# Patient Record
Sex: MALE | Race: WHITE | Employment: UNEMPLOYED | URBAN - NONMETROPOLITAN AREA
[De-identification: names, ages, dates, MRNs, and addresses within clinical notes are randomized per-mention and may not be internally consistent; named-entity substitution may affect disease eponyms.]

---

## 2023-08-10 ENCOUNTER — HOSPITAL ENCOUNTER (INPATIENT)
Age: 58
LOS: 6 days | Discharge: HOME | DRG: 673 | End: 2023-08-16
Attending: INTERNAL MEDICINE | Admitting: STUDENT IN AN ORGANIZED HEALTH CARE EDUCATION/TRAINING PROGRAM
Payer: MEDICARE

## 2023-08-10 PROBLEM — R06.09 DYSPNEA ON EXERTION: Status: ACTIVE | Noted: 2023-08-10

## 2023-08-10 PROBLEM — N17.9 ACUTE KIDNEY INJURY SUPERIMPOSED ON CHRONIC KIDNEY DISEASE (HCC): Status: ACTIVE | Noted: 2023-08-10

## 2023-08-10 PROBLEM — E11.9 TYPE II DIABETES MELLITUS (HCC): Status: ACTIVE | Noted: 2023-08-10

## 2023-08-10 PROBLEM — N18.9 ACUTE KIDNEY INJURY SUPERIMPOSED ON CHRONIC KIDNEY DISEASE (HCC): Status: ACTIVE | Noted: 2023-08-10

## 2023-08-10 PROBLEM — R11.2 ACUTE NAUSEA WITH NONBILIOUS VOMITING: Status: ACTIVE | Noted: 2023-08-10

## 2023-08-10 PROBLEM — Z72.0 TOBACCO USE: Status: ACTIVE | Noted: 2023-08-10

## 2023-08-10 PROBLEM — D64.9 ANEMIA, UNSPECIFIED: Status: ACTIVE | Noted: 2023-08-10

## 2023-08-10 LAB
ALBUMIN SERPL-MCNC: 3.4 G/DL (ref 3.5–5.2)
ALP SERPL-CCNC: 90 U/L (ref 40–130)
ALT SERPL-CCNC: 14 U/L (ref 5–41)
ANION GAP SERPL CALCULATED.3IONS-SCNC: 15 MMOL/L (ref 7–19)
AST SERPL-CCNC: 24 U/L (ref 5–40)
BACTERIA URNS QL MICRO: NEGATIVE /HPF
BASOPHILS # BLD: 0.1 K/UL (ref 0–0.2)
BASOPHILS NFR BLD: 0.5 % (ref 0–1)
BILIRUB SERPL-MCNC: 0.6 MG/DL (ref 0.2–1.2)
BILIRUB UR QL STRIP: NEGATIVE
BUN SERPL-MCNC: 44 MG/DL (ref 6–20)
CALCIUM SERPL-MCNC: 8.7 MG/DL (ref 8.6–10)
CHLORIDE SERPL-SCNC: 101 MMOL/L (ref 98–111)
CLARITY UR: CLEAR
CO2 SERPL-SCNC: 18 MMOL/L (ref 22–29)
COLOR UR: YELLOW
CREAT SERPL-MCNC: 5.9 MG/DL (ref 0.5–1.2)
CREAT UR-MCNC: 102.9 MG/DL (ref 39–259)
CRYSTALS URNS MICRO: ABNORMAL /HPF
EOSINOPHIL # BLD: 0.2 K/UL (ref 0–0.6)
EOSINOPHIL NFR BLD: 1.6 % (ref 0–5)
EPI CELLS #/AREA URNS AUTO: 1 /HPF (ref 0–5)
ERYTHROCYTE [DISTWIDTH] IN BLOOD BY AUTOMATED COUNT: 12.4 % (ref 11.5–14.5)
GLUCOSE BLD-MCNC: 217 MG/DL (ref 70–99)
GLUCOSE SERPL-MCNC: 198 MG/DL (ref 74–109)
GLUCOSE UR STRIP.AUTO-MCNC: 250 MG/DL
HCT VFR BLD AUTO: 28.4 % (ref 42–52)
HGB BLD-MCNC: 9.2 G/DL (ref 14–18)
HGB UR STRIP.AUTO-MCNC: ABNORMAL MG/L
HYALINE CASTS #/AREA URNS AUTO: 0 /HPF (ref 0–8)
IMM GRANULOCYTES # BLD: 0 K/UL
KETONES UR STRIP.AUTO-MCNC: NEGATIVE MG/DL
LEUKOCYTE ESTERASE UR QL STRIP.AUTO: NEGATIVE
LYMPHOCYTES # BLD: 1.5 K/UL (ref 1.1–4.5)
LYMPHOCYTES NFR BLD: 13.7 % (ref 20–40)
MAGNESIUM SERPL-MCNC: 2.1 MG/DL (ref 1.6–2.6)
MCH RBC QN AUTO: 30.1 PG (ref 27–31)
MCHC RBC AUTO-ENTMCNC: 32.4 G/DL (ref 33–37)
MCV RBC AUTO: 92.8 FL (ref 80–94)
MONOCYTES # BLD: 1.1 K/UL (ref 0–0.9)
MONOCYTES NFR BLD: 10.1 % (ref 0–10)
NEUTROPHILS # BLD: 8.1 K/UL (ref 1.5–7.5)
NEUTS SEG NFR BLD: 73.7 % (ref 50–65)
NITRITE UR QL STRIP.AUTO: NEGATIVE
OSMOLALITY URINE: 390 MOSM/KG (ref 250–1200)
PERFORMED ON: ABNORMAL
PH UR STRIP.AUTO: 5.5 [PH] (ref 5–8)
PHOSPHATE SERPL-MCNC: 3.3 MG/DL (ref 2.5–4.5)
PLATELET # BLD AUTO: 202 K/UL (ref 130–400)
PMV BLD AUTO: 10.9 FL (ref 9.4–12.4)
POTASSIUM SERPL-SCNC: 4.2 MMOL/L (ref 3.5–5)
PROT SERPL-MCNC: 7 G/DL (ref 6.6–8.7)
PROT UR STRIP.AUTO-MCNC: 300 MG/DL
RBC # BLD AUTO: 3.06 M/UL (ref 4.7–6.1)
RBC #/AREA URNS AUTO: 8 /HPF (ref 0–4)
SODIUM SERPL-SCNC: 134 MMOL/L (ref 136–145)
SODIUM UR-SCNC: 99 MMOL/L
SP GR UR STRIP.AUTO: 1.02 (ref 1–1.03)
UROBILINOGEN UR STRIP.AUTO-MCNC: 1 E.U./DL
WBC # BLD AUTO: 11 K/UL (ref 4.8–10.8)
WBC #/AREA URNS AUTO: 1 /HPF (ref 0–5)

## 2023-08-10 PROCEDURE — 6360000002 HC RX W HCPCS: Performed by: HOSPITALIST

## 2023-08-10 PROCEDURE — 85025 COMPLETE CBC W/AUTO DIFF WBC: CPT

## 2023-08-10 PROCEDURE — 84100 ASSAY OF PHOSPHORUS: CPT

## 2023-08-10 PROCEDURE — 2500000003 HC RX 250 WO HCPCS: Performed by: HOSPITALIST

## 2023-08-10 PROCEDURE — 1210000000 HC MED SURG R&B

## 2023-08-10 PROCEDURE — 83735 ASSAY OF MAGNESIUM: CPT

## 2023-08-10 PROCEDURE — 83935 ASSAY OF URINE OSMOLALITY: CPT

## 2023-08-10 PROCEDURE — 2580000003 HC RX 258: Performed by: HOSPITALIST

## 2023-08-10 PROCEDURE — 82962 GLUCOSE BLOOD TEST: CPT

## 2023-08-10 PROCEDURE — 94760 N-INVAS EAR/PLS OXIMETRY 1: CPT

## 2023-08-10 PROCEDURE — 82570 ASSAY OF URINE CREATININE: CPT

## 2023-08-10 PROCEDURE — 80053 COMPREHEN METABOLIC PANEL: CPT

## 2023-08-10 PROCEDURE — 81001 URINALYSIS AUTO W/SCOPE: CPT

## 2023-08-10 PROCEDURE — 6370000000 HC RX 637 (ALT 250 FOR IP): Performed by: HOSPITALIST

## 2023-08-10 PROCEDURE — 82043 UR ALBUMIN QUANTITATIVE: CPT

## 2023-08-10 PROCEDURE — 87205 SMEAR GRAM STAIN: CPT

## 2023-08-10 PROCEDURE — 6370000000 HC RX 637 (ALT 250 FOR IP)

## 2023-08-10 PROCEDURE — 36415 COLL VENOUS BLD VENIPUNCTURE: CPT

## 2023-08-10 PROCEDURE — 84300 ASSAY OF URINE SODIUM: CPT

## 2023-08-10 RX ORDER — HEPARIN SODIUM 5000 [USP'U]/ML
5000 INJECTION, SOLUTION INTRAVENOUS; SUBCUTANEOUS EVERY 8 HOURS SCHEDULED
Status: DISCONTINUED | OUTPATIENT
Start: 2023-08-10 | End: 2023-08-11

## 2023-08-10 RX ORDER — ACETAMINOPHEN 650 MG/1
650 SUPPOSITORY RECTAL EVERY 4 HOURS PRN
Status: DISCONTINUED | OUTPATIENT
Start: 2023-08-10 | End: 2023-08-16 | Stop reason: HOSPADM

## 2023-08-10 RX ORDER — METOPROLOL TARTRATE 50 MG/1
50 TABLET, FILM COATED ORAL 2 TIMES DAILY
Status: DISCONTINUED | OUTPATIENT
Start: 2023-08-10 | End: 2023-08-14

## 2023-08-10 RX ORDER — INSULIN LISPRO 100 [IU]/ML
0-4 INJECTION, SOLUTION INTRAVENOUS; SUBCUTANEOUS NIGHTLY
Status: DISCONTINUED | OUTPATIENT
Start: 2023-08-10 | End: 2023-08-16 | Stop reason: HOSPADM

## 2023-08-10 RX ORDER — SODIUM CHLORIDE 0.9 % (FLUSH) 0.9 %
5-40 SYRINGE (ML) INJECTION EVERY 12 HOURS SCHEDULED
Status: DISCONTINUED | OUTPATIENT
Start: 2023-08-10 | End: 2023-08-11 | Stop reason: SDUPTHER

## 2023-08-10 RX ORDER — MONTELUKAST SODIUM 10 MG/1
10 TABLET ORAL DAILY
COMMUNITY

## 2023-08-10 RX ORDER — MONTELUKAST SODIUM 10 MG/1
10 TABLET ORAL DAILY
Status: DISCONTINUED | OUTPATIENT
Start: 2023-08-11 | End: 2023-08-16 | Stop reason: HOSPADM

## 2023-08-10 RX ORDER — DEXTROSE MONOHYDRATE 100 MG/ML
INJECTION, SOLUTION INTRAVENOUS CONTINUOUS PRN
Status: DISCONTINUED | OUTPATIENT
Start: 2023-08-10 | End: 2023-08-16 | Stop reason: HOSPADM

## 2023-08-10 RX ORDER — AMLODIPINE BESYLATE 10 MG/1
10 TABLET ORAL DAILY
Status: DISCONTINUED | OUTPATIENT
Start: 2023-08-11 | End: 2023-08-16 | Stop reason: HOSPADM

## 2023-08-10 RX ORDER — SODIUM CHLORIDE 0.9 % (FLUSH) 0.9 %
5-40 SYRINGE (ML) INJECTION PRN
Status: DISCONTINUED | OUTPATIENT
Start: 2023-08-10 | End: 2023-08-11 | Stop reason: SDUPTHER

## 2023-08-10 RX ORDER — LEVOCETIRIZINE DIHYDROCHLORIDE 5 MG/1
5 TABLET, FILM COATED ORAL DAILY
COMMUNITY

## 2023-08-10 RX ORDER — INSULIN LISPRO 100 [IU]/ML
0-4 INJECTION, SOLUTION INTRAVENOUS; SUBCUTANEOUS
Status: DISCONTINUED | OUTPATIENT
Start: 2023-08-11 | End: 2023-08-16 | Stop reason: HOSPADM

## 2023-08-10 RX ORDER — FERROUS SULFATE 325(65) MG
650 TABLET ORAL
Status: DISCONTINUED | OUTPATIENT
Start: 2023-08-11 | End: 2023-08-12

## 2023-08-10 RX ORDER — CALCIUM CARBONATE 500 MG/1
500 TABLET, CHEWABLE ORAL 3 TIMES DAILY PRN
Status: DISCONTINUED | OUTPATIENT
Start: 2023-08-10 | End: 2023-08-16 | Stop reason: HOSPADM

## 2023-08-10 RX ORDER — FERROUS SULFATE 325(65) MG
650 TABLET ORAL
COMMUNITY

## 2023-08-10 RX ORDER — CHOLECALCIFEROL (VITAMIN D3) 1250 MCG
CAPSULE ORAL
Status: ON HOLD | COMMUNITY
End: 2023-08-16 | Stop reason: HOSPADM

## 2023-08-10 RX ORDER — METOPROLOL TARTRATE 50 MG/1
50 TABLET, FILM COATED ORAL 2 TIMES DAILY
Status: ON HOLD | COMMUNITY
End: 2023-08-16 | Stop reason: HOSPADM

## 2023-08-10 RX ORDER — INSULIN LISPRO 100 [IU]/ML
INJECTION, SOLUTION INTRAVENOUS; SUBCUTANEOUS
COMMUNITY

## 2023-08-10 RX ORDER — SUCRALFATE 1 G/1
1 TABLET ORAL
COMMUNITY

## 2023-08-10 RX ORDER — AMLODIPINE BESYLATE 10 MG/1
10 TABLET ORAL DAILY
COMMUNITY

## 2023-08-10 RX ORDER — SUCRALFATE 1 G/1
1 TABLET ORAL
Status: DISCONTINUED | OUTPATIENT
Start: 2023-08-11 | End: 2023-08-16 | Stop reason: HOSPADM

## 2023-08-10 RX ORDER — CETIRIZINE HYDROCHLORIDE 5 MG/1
5 TABLET ORAL DAILY
Status: DISCONTINUED | OUTPATIENT
Start: 2023-08-11 | End: 2023-08-16 | Stop reason: HOSPADM

## 2023-08-10 RX ORDER — TRIAMTERENE AND HYDROCHLOROTHIAZIDE 37.5; 25 MG/1; MG/1
1 TABLET ORAL DAILY
Status: ON HOLD | COMMUNITY
End: 2023-08-16 | Stop reason: HOSPADM

## 2023-08-10 RX ORDER — POLYETHYLENE GLYCOL 3350 17 G/17G
17 POWDER, FOR SOLUTION ORAL DAILY PRN
Status: DISCONTINUED | OUTPATIENT
Start: 2023-08-10 | End: 2023-08-16 | Stop reason: HOSPADM

## 2023-08-10 RX ORDER — SODIUM CHLORIDE 9 MG/ML
INJECTION, SOLUTION INTRAVENOUS PRN
Status: DISCONTINUED | OUTPATIENT
Start: 2023-08-10 | End: 2023-08-16 | Stop reason: HOSPADM

## 2023-08-10 RX ORDER — ACETAMINOPHEN 325 MG/1
650 TABLET ORAL EVERY 4 HOURS PRN
Status: DISCONTINUED | OUTPATIENT
Start: 2023-08-10 | End: 2023-08-16 | Stop reason: HOSPADM

## 2023-08-10 RX ORDER — ONDANSETRON 4 MG/1
4 TABLET, ORALLY DISINTEGRATING ORAL EVERY 8 HOURS PRN
Status: DISCONTINUED | OUTPATIENT
Start: 2023-08-10 | End: 2023-08-16 | Stop reason: HOSPADM

## 2023-08-10 RX ORDER — MECOBALAMIN 5000 MCG
5 TABLET,DISINTEGRATING ORAL NIGHTLY PRN
Status: DISCONTINUED | OUTPATIENT
Start: 2023-08-10 | End: 2023-08-16 | Stop reason: HOSPADM

## 2023-08-10 RX ORDER — ONDANSETRON 2 MG/ML
4 INJECTION INTRAMUSCULAR; INTRAVENOUS EVERY 6 HOURS PRN
Status: DISCONTINUED | OUTPATIENT
Start: 2023-08-10 | End: 2023-08-16 | Stop reason: HOSPADM

## 2023-08-10 RX ORDER — FAMOTIDINE 20 MG/1
20 TABLET, FILM COATED ORAL DAILY
Status: DISCONTINUED | OUTPATIENT
Start: 2023-08-10 | End: 2023-08-16 | Stop reason: HOSPADM

## 2023-08-10 RX ORDER — FAMOTIDINE 40 MG/1
40 TABLET, FILM COATED ORAL 2 TIMES DAILY
Status: ON HOLD | COMMUNITY
End: 2023-08-16 | Stop reason: SDUPTHER

## 2023-08-10 RX ORDER — FAMOTIDINE 20 MG/1
20 TABLET, FILM COATED ORAL 2 TIMES DAILY
Status: DISCONTINUED | OUTPATIENT
Start: 2023-08-10 | End: 2023-08-10 | Stop reason: DRUGHIGH

## 2023-08-10 RX ORDER — FUROSEMIDE 40 MG/1
40 TABLET ORAL DAILY
COMMUNITY

## 2023-08-10 RX ADMIN — Medication 5 MG: at 22:33

## 2023-08-10 RX ADMIN — SODIUM CHLORIDE, PRESERVATIVE FREE 10 ML: 5 INJECTION INTRAVENOUS at 22:35

## 2023-08-10 RX ADMIN — SODIUM BICARBONATE: 84 INJECTION, SOLUTION INTRAVENOUS at 22:32

## 2023-08-10 RX ADMIN — METOPROLOL TARTRATE 50 MG: 50 TABLET, FILM COATED ORAL at 22:33

## 2023-08-10 RX ADMIN — FAMOTIDINE 20 MG: 20 TABLET ORAL at 22:33

## 2023-08-10 RX ADMIN — HEPARIN SODIUM 5000 UNITS: 5000 INJECTION INTRAVENOUS; SUBCUTANEOUS at 22:35

## 2023-08-10 ASSESSMENT — ENCOUNTER SYMPTOMS
NAUSEA: 1
WHEEZING: 0
ABDOMINAL PAIN: 0
SHORTNESS OF BREATH: 1
COLOR CHANGE: 0
VOMITING: 1
CHEST TIGHTNESS: 0
CONSTIPATION: 0
COUGH: 1
ABDOMINAL DISTENTION: 0

## 2023-08-11 ENCOUNTER — APPOINTMENT (OUTPATIENT)
Dept: INTERVENTIONAL RADIOLOGY/VASCULAR | Age: 58
DRG: 673 | End: 2023-08-11
Attending: SURGERY
Payer: MEDICARE

## 2023-08-11 ENCOUNTER — APPOINTMENT (OUTPATIENT)
Dept: ULTRASOUND IMAGING | Age: 58
DRG: 673 | End: 2023-08-11
Attending: INTERNAL MEDICINE
Payer: MEDICARE

## 2023-08-11 ENCOUNTER — APPOINTMENT (OUTPATIENT)
Dept: CT IMAGING | Age: 58
DRG: 673 | End: 2023-08-11
Attending: INTERNAL MEDICINE
Payer: MEDICARE

## 2023-08-11 LAB
25(OH)D3 SERPL-MCNC: 23.6 NG/ML
25(OH)D3 SERPL-MCNC: 28.4 NG/ML
ANION GAP SERPL CALCULATED.3IONS-SCNC: 14 MMOL/L (ref 7–19)
APTT PPP: 32.9 SEC (ref 26–36.2)
BACTERIA URNS QL MICRO: NEGATIVE /HPF
BASOPHILS # BLD: 0.1 K/UL (ref 0–0.2)
BASOPHILS NFR BLD: 0.5 % (ref 0–1)
BILIRUB UR QL STRIP: NEGATIVE
BNP BLD-MCNC: ABNORMAL PG/ML (ref 0–124)
BUN SERPL-MCNC: 46 MG/DL (ref 6–20)
CALCIUM SERPL-MCNC: 8.3 MG/DL (ref 8.6–10)
CHLORIDE SERPL-SCNC: 102 MMOL/L (ref 98–111)
CLARITY UR: CLEAR
CO2 SERPL-SCNC: 19 MMOL/L (ref 22–29)
COLOR UR: YELLOW
CREAT SERPL-MCNC: 6.2 MG/DL (ref 0.5–1.2)
CREAT UR-MCNC: 109.3 MG/DL (ref 39–259)
CREAT UR-MCNC: 111.7 MG/DL (ref 39–259)
CRYSTALS URNS MICRO: ABNORMAL /HPF
D DIMER PPP FEU-MCNC: 2.49 UG/ML FEU (ref 0–0.48)
EOSINOPHIL # BLD: 0.2 K/UL (ref 0–0.6)
EOSINOPHIL NFR BLD: 1.9 % (ref 0–5)
EOSINOPHIL URNS QL MICRO: NORMAL
EPI CELLS #/AREA URNS AUTO: 0 /HPF (ref 0–5)
ERYTHROCYTE [DISTWIDTH] IN BLOOD BY AUTOMATED COUNT: 12.6 % (ref 11.5–14.5)
FERRITIN SERPL-MCNC: 771.3 NG/ML (ref 30–400)
FOLATE SERPL-MCNC: 9.3 NG/ML (ref 4.5–32.2)
GLUCOSE BLD-MCNC: 161 MG/DL (ref 70–99)
GLUCOSE BLD-MCNC: 171 MG/DL (ref 70–99)
GLUCOSE BLD-MCNC: 181 MG/DL (ref 70–99)
GLUCOSE BLD-MCNC: 188 MG/DL (ref 70–99)
GLUCOSE BLD-MCNC: 192 MG/DL (ref 70–99)
GLUCOSE SERPL-MCNC: 170 MG/DL (ref 74–109)
GLUCOSE UR STRIP.AUTO-MCNC: 100 MG/DL
HAPTOGLOB SERPL-MCNC: 358 MG/DL (ref 30–200)
HAV IGM SERPL QL IA: NORMAL
HBA1C MFR BLD: 6.3 % (ref 4–6)
HBV CORE IGM SERPL QL IA: NORMAL
HBV SURFACE AB SERPL IA-ACNC: NORMAL M[IU]/ML
HBV SURFACE AG SERPL QL IA: NORMAL
HCT VFR BLD AUTO: 28.6 % (ref 42–52)
HCT VFR BLD AUTO: 29.2 % (ref 42–52)
HCV AB SERPL QL IA: NORMAL
HGB BLD-MCNC: 9 G/DL (ref 14–18)
HGB UR STRIP.AUTO-MCNC: ABNORMAL MG/L
HYALINE CASTS #/AREA URNS AUTO: 0 /HPF (ref 0–8)
IMM GRANULOCYTES # BLD: 0 K/UL
IRON SATN MFR SERPL: 12 % (ref 14–50)
IRON SERPL-MCNC: 16 UG/DL (ref 59–158)
KETONES UR STRIP.AUTO-MCNC: NEGATIVE MG/DL
LDH SERPL-CCNC: 479 U/L (ref 91–215)
LEUKOCYTE ESTERASE UR QL STRIP.AUTO: NEGATIVE
LV EF: 35 %
LVEF MODALITY: NORMAL
LYMPHOCYTES # BLD: 1.8 K/UL (ref 1.1–4.5)
LYMPHOCYTES NFR BLD: 13.8 % (ref 20–40)
MCH RBC QN AUTO: 29.8 PG (ref 27–31)
MCHC RBC AUTO-ENTMCNC: 30.8 G/DL (ref 33–37)
MCV RBC AUTO: 96.7 FL (ref 80–94)
MICROALBUMIN UR-MCNC: 201.1 MG/DL (ref 0–19)
MICROALBUMIN UR-MCNC: 328.8 MG/DL (ref 0–19)
MICROALBUMIN/CREAT UR-RTO: 1839.9 MG/G
MICROALBUMIN/CREAT UR-RTO: 2943.6 MG/G
MONOCYTES # BLD: 1.1 K/UL (ref 0–0.9)
MONOCYTES NFR BLD: 8.5 % (ref 0–10)
NEUTROPHILS # BLD: 9.6 K/UL (ref 1.5–7.5)
NEUTS SEG NFR BLD: 75 % (ref 50–65)
NITRITE UR QL STRIP.AUTO: NEGATIVE
PERFORMED ON: ABNORMAL
PH UR STRIP.AUTO: 6 [PH] (ref 5–8)
PLATELET # BLD AUTO: 226 K/UL (ref 130–400)
PMV BLD AUTO: 11.8 FL (ref 9.4–12.4)
POTASSIUM SERPL-SCNC: 4.8 MMOL/L (ref 3.5–5)
PROT UR STRIP.AUTO-MCNC: 300 MG/DL
PROT UR-MCNC: 349 MG/DL (ref 15–45)
PTH-INTACT SERPL-MCNC: 341.5 PG/ML (ref 15–65)
RBC # BLD AUTO: 3.02 M/UL (ref 4.7–6.1)
RBC #/AREA URNS AUTO: 5 /HPF (ref 0–4)
RETICS # AUTO: 0.04 M/UL (ref 0.03–0.12)
RETICS/RBC NFR: 1.2 % (ref 0.5–1.5)
SODIUM SERPL-SCNC: 135 MMOL/L (ref 136–145)
SODIUM UR-SCNC: 62 MMOL/L
SP GR UR STRIP.AUTO: 1.02 (ref 1–1.03)
TIBC SERPL-MCNC: 129 UG/DL (ref 250–400)
TROPONIN T SERPL-MCNC: 6.21 NG/ML (ref 0–0.03)
TROPONIN T SERPL-MCNC: 7.06 NG/ML (ref 0–0.03)
UROBILINOGEN UR STRIP.AUTO-MCNC: 1 E.U./DL
VIT B12 SERPL-MCNC: 542 PG/ML (ref 211–946)
WBC # BLD AUTO: 12.8 K/UL (ref 4.8–10.8)
WBC #/AREA URNS AUTO: 1 /HPF (ref 0–5)

## 2023-08-11 PROCEDURE — 85379 FIBRIN DEGRADATION QUANT: CPT

## 2023-08-11 PROCEDURE — 86706 HEP B SURFACE ANTIBODY: CPT

## 2023-08-11 PROCEDURE — B5181ZA FLUOROSCOPY OF SUPERIOR VENA CAVA USING LOW OSMOLAR CONTRAST, GUIDANCE: ICD-10-PCS | Performed by: SURGERY

## 2023-08-11 PROCEDURE — 85045 AUTOMATED RETICULOCYTE COUNT: CPT

## 2023-08-11 PROCEDURE — 83550 IRON BINDING TEST: CPT

## 2023-08-11 PROCEDURE — 84156 ASSAY OF PROTEIN URINE: CPT

## 2023-08-11 PROCEDURE — 83036 HEMOGLOBIN GLYCOSYLATED A1C: CPT

## 2023-08-11 PROCEDURE — 2580000003 HC RX 258: Performed by: SURGERY

## 2023-08-11 PROCEDURE — 6370000000 HC RX 637 (ALT 250 FOR IP)

## 2023-08-11 PROCEDURE — 1210000000 HC MED SURG R&B

## 2023-08-11 PROCEDURE — 82043 UR ALBUMIN QUANTITATIVE: CPT

## 2023-08-11 PROCEDURE — 80074 ACUTE HEPATITIS PANEL: CPT

## 2023-08-11 PROCEDURE — 83010 ASSAY OF HAPTOGLOBIN QUANT: CPT

## 2023-08-11 PROCEDURE — 99153 MOD SED SAME PHYS/QHP EA: CPT

## 2023-08-11 PROCEDURE — 82306 VITAMIN D 25 HYDROXY: CPT

## 2023-08-11 PROCEDURE — 02HV33Z INSERTION OF INFUSION DEVICE INTO SUPERIOR VENA CAVA, PERCUTANEOUS APPROACH: ICD-10-PCS | Performed by: SURGERY

## 2023-08-11 PROCEDURE — 36415 COLL VENOUS BLD VENIPUNCTURE: CPT

## 2023-08-11 PROCEDURE — C8929 TTE W OR WO FOL WCON,DOPPLER: HCPCS

## 2023-08-11 PROCEDURE — 99152 MOD SED SAME PHYS/QHP 5/>YRS: CPT

## 2023-08-11 PROCEDURE — 82962 GLUCOSE BLOOD TEST: CPT

## 2023-08-11 PROCEDURE — 82607 VITAMIN B-12: CPT

## 2023-08-11 PROCEDURE — 36558 INSERT TUNNELED CV CATH: CPT | Performed by: SURGERY

## 2023-08-11 PROCEDURE — 83540 ASSAY OF IRON: CPT

## 2023-08-11 PROCEDURE — 6360000002 HC RX W HCPCS: Performed by: SURGERY

## 2023-08-11 PROCEDURE — 6360000002 HC RX W HCPCS: Performed by: STUDENT IN AN ORGANIZED HEALTH CARE EDUCATION/TRAINING PROGRAM

## 2023-08-11 PROCEDURE — 74176 CT ABD & PELVIS W/O CONTRAST: CPT

## 2023-08-11 PROCEDURE — 6370000000 HC RX 637 (ALT 250 FOR IP): Performed by: SURGERY

## 2023-08-11 PROCEDURE — 84484 ASSAY OF TROPONIN QUANT: CPT

## 2023-08-11 PROCEDURE — 83880 ASSAY OF NATRIURETIC PEPTIDE: CPT

## 2023-08-11 PROCEDURE — 6370000000 HC RX 637 (ALT 250 FOR IP): Performed by: HOSPITALIST

## 2023-08-11 PROCEDURE — 94760 N-INVAS EAR/PLS OXIMETRY 1: CPT

## 2023-08-11 PROCEDURE — 6360000002 HC RX W HCPCS: Performed by: HOSPITALIST

## 2023-08-11 PROCEDURE — 76937 US GUIDE VASCULAR ACCESS: CPT

## 2023-08-11 PROCEDURE — C1769 GUIDE WIRE: HCPCS

## 2023-08-11 PROCEDURE — 85730 THROMBOPLASTIN TIME PARTIAL: CPT

## 2023-08-11 PROCEDURE — 0JH63XZ INSERTION OF TUNNELED VASCULAR ACCESS DEVICE INTO CHEST SUBCUTANEOUS TISSUE AND FASCIA, PERCUTANEOUS APPROACH: ICD-10-PCS | Performed by: SURGERY

## 2023-08-11 PROCEDURE — 77001 FLUOROGUIDE FOR VEIN DEVICE: CPT

## 2023-08-11 PROCEDURE — 82570 ASSAY OF URINE CREATININE: CPT

## 2023-08-11 PROCEDURE — 81001 URINALYSIS AUTO W/SCOPE: CPT

## 2023-08-11 PROCEDURE — 36558 INSERT TUNNELED CV CATH: CPT

## 2023-08-11 PROCEDURE — 83970 ASSAY OF PARATHORMONE: CPT

## 2023-08-11 PROCEDURE — 2500000003 HC RX 250 WO HCPCS: Performed by: SURGERY

## 2023-08-11 PROCEDURE — 6370000000 HC RX 637 (ALT 250 FOR IP): Performed by: INTERNAL MEDICINE

## 2023-08-11 PROCEDURE — 82746 ASSAY OF FOLIC ACID SERUM: CPT

## 2023-08-11 PROCEDURE — 80048 BASIC METABOLIC PNL TOTAL CA: CPT

## 2023-08-11 PROCEDURE — 85025 COMPLETE CBC W/AUTO DIFF WBC: CPT

## 2023-08-11 PROCEDURE — 77001 FLUOROGUIDE FOR VEIN DEVICE: CPT | Performed by: SURGERY

## 2023-08-11 PROCEDURE — 84300 ASSAY OF URINE SODIUM: CPT

## 2023-08-11 PROCEDURE — 76770 US EXAM ABDO BACK WALL COMP: CPT

## 2023-08-11 PROCEDURE — 82728 ASSAY OF FERRITIN: CPT

## 2023-08-11 PROCEDURE — 83615 LACTATE (LD) (LDH) ENZYME: CPT

## 2023-08-11 RX ORDER — SODIUM CHLORIDE 0.9 % (FLUSH) 0.9 %
5-40 SYRINGE (ML) INJECTION EVERY 12 HOURS SCHEDULED
Status: DISCONTINUED | OUTPATIENT
Start: 2023-08-11 | End: 2023-08-16 | Stop reason: HOSPADM

## 2023-08-11 RX ORDER — ENOXAPARIN SODIUM 100 MG/ML
30 INJECTION SUBCUTANEOUS DAILY
Status: DISCONTINUED | OUTPATIENT
Start: 2023-08-12 | End: 2023-08-11 | Stop reason: SDUPTHER

## 2023-08-11 RX ORDER — ASPIRIN 81 MG/1
81 TABLET ORAL DAILY
Status: DISCONTINUED | OUTPATIENT
Start: 2023-08-12 | End: 2023-08-11

## 2023-08-11 RX ORDER — INSULIN LISPRO 100 [IU]/ML
2 INJECTION, SOLUTION INTRAVENOUS; SUBCUTANEOUS
Status: DISCONTINUED | OUTPATIENT
Start: 2023-08-11 | End: 2023-08-16 | Stop reason: HOSPADM

## 2023-08-11 RX ORDER — HEPARIN SODIUM 1000 [USP'U]/ML
4000 INJECTION, SOLUTION INTRAVENOUS; SUBCUTANEOUS ONCE
Status: COMPLETED | OUTPATIENT
Start: 2023-08-11 | End: 2023-08-11

## 2023-08-11 RX ORDER — INSULIN GLARGINE 100 [IU]/ML
9 INJECTION, SOLUTION SUBCUTANEOUS DAILY
Status: DISCONTINUED | OUTPATIENT
Start: 2023-08-11 | End: 2023-08-16 | Stop reason: HOSPADM

## 2023-08-11 RX ORDER — FENTANYL CITRATE 50 UG/ML
INJECTION, SOLUTION INTRAMUSCULAR; INTRAVENOUS PRN
Status: COMPLETED | OUTPATIENT
Start: 2023-08-11 | End: 2023-08-11

## 2023-08-11 RX ORDER — HEPARIN SODIUM 5000 [USP'U]/ML
INJECTION, SOLUTION INTRAVENOUS; SUBCUTANEOUS PRN
Status: COMPLETED | OUTPATIENT
Start: 2023-08-11 | End: 2023-08-11

## 2023-08-11 RX ORDER — ASPIRIN 81 MG/1
162 TABLET, CHEWABLE ORAL ONCE
Status: COMPLETED | OUTPATIENT
Start: 2023-08-11 | End: 2023-08-11

## 2023-08-11 RX ORDER — HEPARIN SODIUM 10000 [USP'U]/100ML
5-30 INJECTION, SOLUTION INTRAVENOUS CONTINUOUS
Status: DISCONTINUED | OUTPATIENT
Start: 2023-08-11 | End: 2023-08-16

## 2023-08-11 RX ORDER — ASPIRIN 81 MG/1
324 TABLET, CHEWABLE ORAL ONCE
Status: DISCONTINUED | OUTPATIENT
Start: 2023-08-11 | End: 2023-08-11

## 2023-08-11 RX ORDER — SODIUM CHLORIDE 0.9 % (FLUSH) 0.9 %
5-40 SYRINGE (ML) INJECTION PRN
Status: DISCONTINUED | OUTPATIENT
Start: 2023-08-11 | End: 2023-08-16 | Stop reason: HOSPADM

## 2023-08-11 RX ORDER — HEPARIN SODIUM 1000 [USP'U]/ML
4000 INJECTION, SOLUTION INTRAVENOUS; SUBCUTANEOUS PRN
Status: DISCONTINUED | OUTPATIENT
Start: 2023-08-11 | End: 2023-08-16 | Stop reason: SDUPTHER

## 2023-08-11 RX ORDER — ATORVASTATIN CALCIUM 40 MG/1
40 TABLET, FILM COATED ORAL NIGHTLY
Status: DISCONTINUED | OUTPATIENT
Start: 2023-08-11 | End: 2023-08-16 | Stop reason: HOSPADM

## 2023-08-11 RX ORDER — MIDAZOLAM HYDROCHLORIDE 2 MG/2ML
INJECTION, SOLUTION INTRAMUSCULAR; INTRAVENOUS PRN
Status: COMPLETED | OUTPATIENT
Start: 2023-08-11 | End: 2023-08-11

## 2023-08-11 RX ORDER — ASPIRIN 81 MG/1
81 TABLET, CHEWABLE ORAL DAILY
Status: DISCONTINUED | OUTPATIENT
Start: 2023-08-11 | End: 2023-08-16 | Stop reason: HOSPADM

## 2023-08-11 RX ORDER — SODIUM CHLORIDE 9 MG/ML
INJECTION, SOLUTION INTRAVENOUS PRN
Status: DISCONTINUED | OUTPATIENT
Start: 2023-08-11 | End: 2023-08-16 | Stop reason: HOSPADM

## 2023-08-11 RX ORDER — LIDOCAINE HYDROCHLORIDE AND EPINEPHRINE BITARTRATE 20; .01 MG/ML; MG/ML
INJECTION, SOLUTION SUBCUTANEOUS PRN
Status: COMPLETED | OUTPATIENT
Start: 2023-08-11 | End: 2023-08-11

## 2023-08-11 RX ORDER — HEPARIN SODIUM 1000 [USP'U]/ML
2000 INJECTION, SOLUTION INTRAVENOUS; SUBCUTANEOUS PRN
Status: DISCONTINUED | OUTPATIENT
Start: 2023-08-11 | End: 2023-08-16 | Stop reason: SDUPTHER

## 2023-08-11 RX ADMIN — METOPROLOL TARTRATE 50 MG: 50 TABLET, FILM COATED ORAL at 19:58

## 2023-08-11 RX ADMIN — FENTANYL CITRATE 25 MCG: 50 INJECTION, SOLUTION INTRAMUSCULAR; INTRAVENOUS at 15:54

## 2023-08-11 RX ADMIN — ASPIRIN 81 MG: 81 TABLET, CHEWABLE ORAL at 19:58

## 2023-08-11 RX ADMIN — FAMOTIDINE 20 MG: 20 TABLET ORAL at 19:58

## 2023-08-11 RX ADMIN — INSULIN GLARGINE 9 UNITS: 100 INJECTION, SOLUTION SUBCUTANEOUS at 08:47

## 2023-08-11 RX ADMIN — MONTELUKAST SODIUM 10 MG: 10 TABLET, FILM COATED ORAL at 08:47

## 2023-08-11 RX ADMIN — HEPARIN SODIUM 4000 UNITS: 1000 INJECTION INTRAVENOUS; SUBCUTANEOUS at 20:49

## 2023-08-11 RX ADMIN — MIDAZOLAM HYDROCHLORIDE 1 MG: 1 INJECTION, SOLUTION INTRAMUSCULAR; INTRAVENOUS at 15:51

## 2023-08-11 RX ADMIN — INSULIN LISPRO 2 UNITS: 100 INJECTION, SOLUTION INTRAVENOUS; SUBCUTANEOUS at 19:03

## 2023-08-11 RX ADMIN — HEPARIN SODIUM 5000 UNITS: 5000 INJECTION INTRAVENOUS; SUBCUTANEOUS at 15:50

## 2023-08-11 RX ADMIN — METOPROLOL TARTRATE 50 MG: 50 TABLET, FILM COATED ORAL at 08:47

## 2023-08-11 RX ADMIN — AMLODIPINE BESYLATE 10 MG: 10 TABLET ORAL at 08:47

## 2023-08-11 RX ADMIN — CEFAZOLIN 2000 MG: 2 INJECTION, POWDER, FOR SOLUTION INTRAMUSCULAR; INTRAVENOUS at 15:42

## 2023-08-11 RX ADMIN — LIDOCAINE HYDROCHLORIDE AND EPINEPHRINE 20 ML: 20; 10 INJECTION, SOLUTION INFILTRATION; PERINEURAL at 15:50

## 2023-08-11 RX ADMIN — SODIUM CHLORIDE, PRESERVATIVE FREE 10 ML: 5 INJECTION INTRAVENOUS at 19:59

## 2023-08-11 RX ADMIN — HEPARIN SODIUM 5000 UNITS: 5000 INJECTION INTRAVENOUS; SUBCUTANEOUS at 05:26

## 2023-08-11 RX ADMIN — FERROUS SULFATE TAB 325 MG (65 MG ELEMENTAL FE) 650 MG: 325 (65 FE) TAB at 08:47

## 2023-08-11 RX ADMIN — SUCRALFATE 1 G: 1 TABLET ORAL at 05:26

## 2023-08-11 RX ADMIN — MIDAZOLAM HYDROCHLORIDE 1 MG: 1 INJECTION, SOLUTION INTRAMUSCULAR; INTRAVENOUS at 15:54

## 2023-08-11 RX ADMIN — CETIRIZINE HYDROCHLORIDE 5 MG: 5 TABLET ORAL at 08:47

## 2023-08-11 RX ADMIN — INSULIN LISPRO 2 UNITS: 100 INJECTION, SOLUTION INTRAVENOUS; SUBCUTANEOUS at 08:47

## 2023-08-11 RX ADMIN — ASPIRIN 162 MG: 81 TABLET, CHEWABLE ORAL at 19:57

## 2023-08-11 RX ADMIN — HEPARIN SODIUM 9 UNITS/KG/HR: 10000 INJECTION, SOLUTION INTRAVENOUS at 20:53

## 2023-08-11 RX ADMIN — FENTANYL CITRATE 25 MCG: 50 INJECTION, SOLUTION INTRAMUSCULAR; INTRAVENOUS at 15:51

## 2023-08-11 RX ADMIN — ATORVASTATIN CALCIUM 40 MG: 40 TABLET, FILM COATED ORAL at 19:58

## 2023-08-11 ASSESSMENT — ENCOUNTER SYMPTOMS
SHORTNESS OF BREATH: 1
ABDOMINAL PAIN: 0
CHEST TIGHTNESS: 0
DIARRHEA: 0

## 2023-08-11 NOTE — PLAN OF CARE
Problem: Discharge Planning  Goal: Discharge to home or other facility with appropriate resources  Outcome: Progressing     Problem: Safety - Adult  Goal: Free from fall injury  Outcome: Progressing   Electronically signed by Hilaria Fraser RN on 8/10/2023 at 11:14 PM

## 2023-08-11 NOTE — CARE COORDINATION
KAILEE submitted referral for outpt HD set-up; pending OP note; will send when populates.   [facility] Intake (dialysis)  [phone]  [phone]  Electronically signed by CRUZ Heath on 8/11/2023 at 2:31 PM

## 2023-08-11 NOTE — PLAN OF CARE
Day Team Sign Out:  CHIARA on CKD  44/4.1 in March, PCP has been monitoring  Presented with dyspnea  Now on O2  Renal function now 39/5.8 Cr 4.1->5.8  GFR 11  K 4.9    All else WNL  EKG WNL  /80s  BNP 30,000  Sent here for Nephrology consultation      CHIARA (Cr baseline 4.1 now at 5.8) on CKD (unstageable as no baseline GFR) withOUT hyperkalemia (Potassium 4.9 PoA):   Admit to [address] [place] [address], Nephrology area preferred if available  Strict Is and Os  Daily Weights  Elevate HoB 30' atleast  Elevate Legs to prevent sliding down in bed  Added on to UA Urine OSM/EOS/Na/Cr  Avoid Nephrotoxins as able: NSAIDs/ACEi/ARB/Diuretic/Aminoglycosides/IodinatedContrast etc  Renal US  Nephrology Consultation in AM  IVF with NaHCO3 150meq per liter strile water soluition, to run at 75 cc/h for now  Elevate HoB & Legs (Legs to prevent sliding down in bed)     Chronic Medical Problems:  Continue Home regimen as able / indicated once known     Supportive and Prophylactic Txx:  DVT PPx: Heparin 5,000 units SQ Q8h   GI (PUD) PPx: not indicated   PT: not indicated

## 2023-08-11 NOTE — CONSULTS
[facility] Vascular Surgery    CONSULT    Patient:  Valeria Sinha  YOB: 1965  Date of Service: 8/11/2023  MRN: [id number]   Acct: [de-identified]   Primary Care Physician: Timothy Alanis DO    Reason for consult: placement of perm    Requesting Physician: Dr. Naomi Hirsch    History Obtained From: Patient and chart review    HISTORY OF PRESENT ILLNESS:  Mr. Valeria Sinha is a 62 y.o. male who presented to OSH with dyspnea, fatigue, weakness and vomiting. He was found to have CHIARA at OSH and transferred to [facility] for nephrology services due to CHIARA on CKD. Workup on arrival included Creatinine 5.9, BUN 44, pro-BNP >35,000, A1C 6.3 and elevated Ddimer. Labs this AM show creatinine of 6.2. Nephrology consulting vascular surgery for placement of permacath. Past Medical History:   No past medical history on file. Past Surgical History:    No past surgical history on file.     Allergies:  Levaquin [levofloxacin]    Medications Current:   Current Facility-Administered Medications   Medication Dose Route Frequency Provider Last Rate Last Admin    insulin glargine (LANTUS) injection vial 9 Units  9 Units SubCUTAneous Daily Alexandria Huger, APRN - CNP   9 Units at 08/11/23 0847    insulin lispro (HUMALOG) injection vial 2 Units  2 Units SubCUTAneous TID WC Alexandria Huger, APRN - CNP   2 Units at 08/11/23 0847    sodium bicarbonate 150 mEq in sterile water 1,000 mL infusion   IntraVENous Continuous Karthik Aguirre MD 75 mL/hr at 08/10/23 2232 New Bag at 08/10/23 2232    polyethylene glycol (GLYCOLAX) packet 17 g  17 g Oral Daily PRN Karthik Aguirre MD        melatonin disintegrating tablet 5 mg  5 mg Oral Nightly PRN Karthik Aguirre MD   5 mg at 08/10/23 2233    calcium carbonate (TUMS) chewable tablet 500 mg  500 mg Oral TID PRN Karthik Aguirre MD        sodium chloride flush 0.9 % injection 5-40 mL  5-40 mL IntraVENous 2 times per day Karthik Aguirre MD   10 mL at 08/10/23 2235    sodium chloride flush 0.9 %

## 2023-08-11 NOTE — CARE COORDINATION
Case Management Assessment  Initial Evaluation    Date/Time of Evaluation: 8/11/2023 3:02 PM  Assessment Completed by: Beatris Skiff    If patient is discharged prior to next notation, then this note serves as note for discharge by case management. Patient Name: Keira Poe                   YOB: 1965  Diagnosis: Acute kidney injury superimposed on chronic kidney disease ([address]) [N17.9, N18.9]                   Date / Time: 8/10/2023  7:42 PM    Patient Admission Status: Inpatient   Readmission Risk (Low < 19, Mod (19-27), High > 27): Readmission Risk Score: 18.5    Current PCP: Loyda Oquendo, DO  PCP verified by CM? (P) Yes    Chart Reviewed: Yes      History Provided by: (P) Patient  Patient Orientation: (P) Alert and Oriented    Patient Cognition: (P) Alert    Hospitalization in the last 30 days (Readmission):  No    If yes, Readmission Assessment in CM Navigator will be completed.     Advance Directives:      Code Status: Full Code   Patient's Primary Decision Maker is: (P) Legal Next of Kin (Spouse and children)      Discharge Planning:    Patient lives with: (P) Spouse/Significant Other Type of Home: (P) House  Primary Care Giver: (P) Self  Patient Support Systems include: (P) Spouse/Significant Other, Children, Family Members, [employer]/[place] [employer], Friends/Neighbors   Current Financial resources: (P) Medicare  Current community resources: (P) None (Pt denies needs)  Current services prior to admission: (P) Durable Medical Equipment            Current DME: (P) Cane, Crutches, Bedside Commode, Walker            Type of Home Care services:  (P) None (Pt denies needs)    ADLS  Prior functional level: (P) Independent in ADLs/IADLs  Current functional level: (P) Independent in ADLs/IADLs    PT AM-PAC:   /24  OT AM-PAC:   /24    Family can provide assistance at DC: (P) Yes  Would you like Case Management to discuss the discharge plan with any other family members/significant others,

## 2023-08-11 NOTE — H&P
[facility]      Hospitalist - History & Physical      PCP: Dayna Aguayo DO    Date of Admission: 8/10/2023    Date of Service: 8/10/2023    Chief Complaint: Abnormal labs    History Of Present Illness: The patient is a 62 y.o. male who presented to [facility] direct admit [facility] ER with PMH HTN, type II DM, hyperlipidemia, GERD, s/p partial amputation right foot, dips, complaining of abnormal labs. Patient states on Monday night he ate fried green tomatoes and afterwards he became very sick with nausea and bilious nonbloody vomiting X5. He states since then he has had decreased appetite and oral intake, dyspnea on exertion, generalized weakness and fatigue. Today he became diaphoretic, dry heaving and dyspneic at rest who presented to his PCP for further evaluation. Labs ordered and patient was called and instructed to OSH ER for further evaluation patient denies fever, chills, abdominal pain, chest pain, syncopal event, recent fall or trauma. Denies hematuria hematemesis or blood in stool. Of note patient does state prior history right foot infection s/p amputation then developed renal dysfunction. He states that PCP told him levels have improved however no current data to reflect prior renal function. Workup in OSH ER CXR no acute cardiopulmonary process, , K4.9, creatinine 5.8 BUN 39, BNP greater than 30,000, procalcitonin 3.29, WBC 16.1, Hgb 9.4 HCT 28.7, urinalysis negative. No medications given while at OSH ER. Patient was transferred for higher level of care. Patient is to be admitted to the hospital service with consultation to nephrology. Past Medical History:    No past medical history on file. Past Surgical History:    No past surgical history on file. Home Medications:  Prior to Admission medications    Medication Sig Start Date End Date Taking?  Authorizing Provider   ferrous sulfate (IRON 325) 325 (65 Fe) MG tablet Take 2 tablets by mouth

## 2023-08-11 NOTE — CARE COORDINATION
SW attempted visit for assessment;  Pt with multiple visitors; will f/u another time  Electronically signed by CRUZ Hernandez on 8/11/2023 at 3:18 PM

## 2023-08-11 NOTE — CARE COORDINATION
SW following for new outpatient HD set-up;  Pt scheduled to receive permcath placement today  Electronically signed by CRUZ Abel on 8/11/2023 at 1:13 PM

## 2023-08-11 NOTE — CONSULTS
Nephrology ([facility]) Consult Note      Patient:  Chente Wilkinson  YOB: 1965  Date of Service: 8/11/2023  MRN: [id number]   Acct: [de-identified]   Primary Care Physician: Tg Brown DO  Advance Directive: Full Code  Admit Date: 8/10/2023       Hospital Day: 1  Referring Provider: Etta Murphy MD    Patient independently seen and examined, Chart, Consults, Notes, Operative notes, Labs, Cardiology, and Radiology studies reviewed as available. Chief complaint: Abnormal labs. Subjective:  Chente Wilkinson is a 62 y.o. male for whom we were consulted for evaluation and treatment of acute kidney injury. Patient has chronic kidney disease, never been evaluated by nephrology before. He recently underwent right transmetatarsal amputation of foot due to diabetic ulcer/osteomyelitis. He was treated with IV antibiotics. He was told recently that he has chronic kidney disease. Patient also has history of type 2 diabetes, hypertension, hyperlipidemia and abdominal obesity. He was directed to go to the hospital as he was found to have significantly high serum creatinine. He was then accepted as a transfer from [facility] ER. His serum creatinine on initial lab data indicated creatinine of 5.8 mg with estimated GFR 10 mL. Patient was also complaining of shortness of breath, dyspnea on exertion which was new for him. He denies any nausea vomiting or diarrhea. Patient also denies any recent use of nonsteroidal agents. This morning after initial evaluation, it was decided to initiate dialysis. I have discontinued IV fluid as he was complaining of shortness of breath while he was lying down.     Allergies:  Levaquin [levofloxacin]    Medicines:  Current Facility-Administered Medications   Medication Dose Route Frequency Provider Last Rate Last Admin    insulin glargine (LANTUS) injection vial 9 Units  9 Units SubCUTAneous Daily Lele Anand

## 2023-08-11 NOTE — OP NOTE
Operative Note      Patient: Martha Lee  YOB: 1965  MRN: [id number]      8/11/2023    Preoperative Diagnosis:    1. ESRD requiring hemodialysis     Postoperative Diagnosis:  Same    Operative Procedure:    1. Ultrasound guided cannulation of right internal jugular vein   2. Placement of right internal jugular vein tunneled dialysis catheter (Bard Equistream XK 23 cm tip to cuff). Surgeon:  Sierra Chanel DO    Anesthesia:  Local and Moderate Sedation    EBL:  Less than 50 ml    Findings:  1. The right internal jugular vein is patent. 2.  The dialysis catheter tips are in the right atrium/superior vena cava junction. Procedure in Detail:    After the patient was consented, and given intravenous antibiotics, the patient was brought to angiography and placed on the table in the supine position. Moderate sedation was administered and the patient's Right neck and chest were prepped and draped in the usual sterile fashion. Skin and subcutaneous tissues over the internal jugular vein were anesthetized with licocaine. The right internal jugular vein was cannulated under ultrasound guidance with a micropuncture needle. Seldinger technique was utilized to place an 0.035 wire into the inferior vena cava under fluoroscopic visualization. The Right neck and chest were anesthetized with lidocaine. An incision was made in the right neck over the wire with knife. A separate incision was made in the right chest with knife. The catheter was tunneled from the chest to the neck incision. Dilators were passed over the wire under fluoroscopic visualization. A dilator/tear-away sheath was placed over the wire under fluoroscopic visualization and the dilator and wire were removed. The catheter was placed through the tear-away sheath and down to the right atrium under fluoroscopic visualization.   The tear-away sheath was removed and the catheter was withdrawn until the tips were in the

## 2023-08-12 LAB
ALBUMIN SERPL-MCNC: 3.1 G/DL (ref 3.5–5.2)
ALP SERPL-CCNC: 110 U/L (ref 40–130)
ALT SERPL-CCNC: 18 U/L (ref 5–41)
ANION GAP SERPL CALCULATED.3IONS-SCNC: 16 MMOL/L (ref 7–19)
APTT PPP: 35.5 SEC (ref 26–36.2)
APTT PPP: 38.7 SEC (ref 26–36.2)
APTT PPP: 54.7 SEC (ref 26–36.2)
APTT PPP: 58 SEC (ref 26–36.2)
AST SERPL-CCNC: 29 U/L (ref 5–40)
BASOPHILS # BLD: 0.1 K/UL (ref 0–0.2)
BASOPHILS NFR BLD: 0.6 % (ref 0–1)
BILIRUB SERPL-MCNC: 0.6 MG/DL (ref 0.2–1.2)
BUN SERPL-MCNC: 53 MG/DL (ref 6–20)
CALCIUM SERPL-MCNC: 8 MG/DL (ref 8.6–10)
CHLORIDE SERPL-SCNC: 98 MMOL/L (ref 98–111)
CO2 SERPL-SCNC: 19 MMOL/L (ref 22–29)
CREAT SERPL-MCNC: 6.1 MG/DL (ref 0.5–1.2)
EOSINOPHIL # BLD: 0.2 K/UL (ref 0–0.6)
EOSINOPHIL NFR BLD: 2 % (ref 0–5)
ERYTHROCYTE [DISTWIDTH] IN BLOOD BY AUTOMATED COUNT: 12.2 % (ref 11.5–14.5)
GLUCOSE BLD-MCNC: 137 MG/DL (ref 70–99)
GLUCOSE BLD-MCNC: 160 MG/DL (ref 70–99)
GLUCOSE BLD-MCNC: 171 MG/DL (ref 70–99)
GLUCOSE BLD-MCNC: 177 MG/DL (ref 70–99)
GLUCOSE SERPL-MCNC: 162 MG/DL (ref 74–109)
HCT VFR BLD AUTO: 26.7 % (ref 42–52)
HGB BLD-MCNC: 8.5 G/DL (ref 14–18)
IMM GRANULOCYTES # BLD: 0 K/UL
LYMPHOCYTES # BLD: 1.3 K/UL (ref 1.1–4.5)
LYMPHOCYTES NFR BLD: 13.1 % (ref 20–40)
MCH RBC QN AUTO: 29.6 PG (ref 27–31)
MCHC RBC AUTO-ENTMCNC: 31.8 G/DL (ref 33–37)
MCV RBC AUTO: 93 FL (ref 80–94)
MONOCYTES # BLD: 1 K/UL (ref 0–0.9)
MONOCYTES NFR BLD: 10.3 % (ref 0–10)
NEUTROPHILS # BLD: 7.2 K/UL (ref 1.5–7.5)
NEUTS SEG NFR BLD: 73.6 % (ref 50–65)
PERFORMED ON: ABNORMAL
PHOSPHATE SERPL-MCNC: 2.8 MG/DL (ref 2.5–4.5)
PLATELET # BLD AUTO: 220 K/UL (ref 130–400)
PMV BLD AUTO: 11.1 FL (ref 9.4–12.4)
POTASSIUM SERPL-SCNC: 4.7 MMOL/L (ref 3.5–5)
PROT SERPL-MCNC: 6.8 G/DL (ref 6.6–8.7)
RBC # BLD AUTO: 2.87 M/UL (ref 4.7–6.1)
SODIUM SERPL-SCNC: 133 MMOL/L (ref 136–145)
TROPONIN T SERPL-MCNC: 7.41 NG/ML (ref 0–0.03)
TROPONIN T SERPL-MCNC: 7.72 NG/ML (ref 0–0.03)
TROPONIN T SERPL-MCNC: 7.85 NG/ML (ref 0–0.03)
TROPONIN T SERPL-MCNC: 8.65 NG/ML (ref 0–0.03)
WBC # BLD AUTO: 9.8 K/UL (ref 4.8–10.8)

## 2023-08-12 PROCEDURE — 2580000003 HC RX 258: Performed by: SURGERY

## 2023-08-12 PROCEDURE — 80053 COMPREHEN METABOLIC PANEL: CPT

## 2023-08-12 PROCEDURE — 6360000002 HC RX W HCPCS: Performed by: STUDENT IN AN ORGANIZED HEALTH CARE EDUCATION/TRAINING PROGRAM

## 2023-08-12 PROCEDURE — 93005 ELECTROCARDIOGRAM TRACING: CPT | Performed by: STUDENT IN AN ORGANIZED HEALTH CARE EDUCATION/TRAINING PROGRAM

## 2023-08-12 PROCEDURE — 36415 COLL VENOUS BLD VENIPUNCTURE: CPT

## 2023-08-12 PROCEDURE — 6360000002 HC RX W HCPCS: Performed by: INTERNAL MEDICINE

## 2023-08-12 PROCEDURE — 85025 COMPLETE CBC W/AUTO DIFF WBC: CPT

## 2023-08-12 PROCEDURE — 85730 THROMBOPLASTIN TIME PARTIAL: CPT

## 2023-08-12 PROCEDURE — 84484 ASSAY OF TROPONIN QUANT: CPT

## 2023-08-12 PROCEDURE — 8010000000 HC HEMODIALYSIS ACUTE INPT

## 2023-08-12 PROCEDURE — 82962 GLUCOSE BLOOD TEST: CPT

## 2023-08-12 PROCEDURE — 6370000000 HC RX 637 (ALT 250 FOR IP): Performed by: SURGERY

## 2023-08-12 PROCEDURE — 1210000000 HC MED SURG R&B

## 2023-08-12 PROCEDURE — 84100 ASSAY OF PHOSPHORUS: CPT

## 2023-08-12 PROCEDURE — 6370000000 HC RX 637 (ALT 250 FOR IP): Performed by: INTERNAL MEDICINE

## 2023-08-12 RX ORDER — CALCITRIOL 0.25 UG/1
0.25 CAPSULE, LIQUID FILLED ORAL DAILY
Status: DISCONTINUED | OUTPATIENT
Start: 2023-08-12 | End: 2023-08-16 | Stop reason: HOSPADM

## 2023-08-12 RX ADMIN — FAMOTIDINE 20 MG: 20 TABLET ORAL at 20:06

## 2023-08-12 RX ADMIN — ATORVASTATIN CALCIUM 40 MG: 40 TABLET, FILM COATED ORAL at 20:05

## 2023-08-12 RX ADMIN — SUCRALFATE 1 G: 1 TABLET ORAL at 16:06

## 2023-08-12 RX ADMIN — AMLODIPINE BESYLATE 10 MG: 10 TABLET ORAL at 11:59

## 2023-08-12 RX ADMIN — FERROUS SULFATE TAB 325 MG (65 MG ELEMENTAL FE) 650 MG: 325 (65 FE) TAB at 11:59

## 2023-08-12 RX ADMIN — CALCITRIOL CAPSULES 0.25 MCG 0.25 MCG: 0.25 CAPSULE ORAL at 13:34

## 2023-08-12 RX ADMIN — METOPROLOL TARTRATE 50 MG: 50 TABLET, FILM COATED ORAL at 20:05

## 2023-08-12 RX ADMIN — SUCRALFATE 1 G: 1 TABLET ORAL at 11:59

## 2023-08-12 RX ADMIN — SODIUM CHLORIDE, PRESERVATIVE FREE 10 ML: 5 INJECTION INTRAVENOUS at 20:06

## 2023-08-12 RX ADMIN — MONTELUKAST SODIUM 10 MG: 10 TABLET, FILM COATED ORAL at 12:04

## 2023-08-12 RX ADMIN — INSULIN GLARGINE 9 UNITS: 100 INJECTION, SOLUTION SUBCUTANEOUS at 11:59

## 2023-08-12 RX ADMIN — ASPIRIN 81 MG: 81 TABLET, CHEWABLE ORAL at 11:58

## 2023-08-12 RX ADMIN — HEPARIN SODIUM 4000 UNITS: 1000 INJECTION INTRAVENOUS; SUBCUTANEOUS at 03:11

## 2023-08-12 RX ADMIN — SUCRALFATE 1 G: 1 TABLET ORAL at 05:28

## 2023-08-12 RX ADMIN — HEPARIN SODIUM 4000 UNITS: 1000 INJECTION INTRAVENOUS; SUBCUTANEOUS at 10:50

## 2023-08-12 RX ADMIN — IRON SUCROSE 200 MG: 20 INJECTION, SOLUTION INTRAVENOUS at 15:16

## 2023-08-12 RX ADMIN — CETIRIZINE HYDROCHLORIDE 5 MG: 5 TABLET ORAL at 11:58

## 2023-08-12 ASSESSMENT — ENCOUNTER SYMPTOMS
ABDOMINAL PAIN: 0
CHEST TIGHTNESS: 0
SHORTNESS OF BREATH: 1
DIARRHEA: 0

## 2023-08-12 NOTE — PLAN OF CARE
Problem: Discharge Planning  Goal: Discharge to home or other facility with appropriate resources  Outcome: Progressing  Flowsheets (Taken 8/11/2023 2056)  Discharge to home or other facility with appropriate resources: Identify barriers to discharge with patient and caregiver     Problem: Safety - Adult  Goal: Free from fall injury  Outcome: Progressing     Problem: Chronic Conditions and Co-morbidities  Goal: Patient's chronic conditions and co-morbidity symptoms are monitored and maintained or improved  Outcome: Progressing  Flowsheets (Taken 8/11/2023 2056)  Care Plan - Patient's Chronic Conditions and Co-Morbidity Symptoms are Monitored and Maintained or Improved: Monitor and assess patient's chronic conditions and comorbid symptoms for stability, deterioration, or improvement

## 2023-08-12 NOTE — CONSULTS
[facility]  Cardiology Consult      Requesting MD:  Yony Mari MD   Admit Status:         History obtained from:   [x] Patient  [] Other (specify):     Reason for consultation :  Acute systolic heart failure       PRESENTATION: Veronika Mcfarlane is a 62y.o. year old male who presents with new onset acute systolic heart failure. Patient past medical history significant for hypertension, hyperlipidemia, type 2 diabetes uncontrolled, active smoker, acute on chronic kidney disease with a permacath placement on the right subclavian and initiated on dialysis this morning. Noted to have echo with EF of 35% and wall motion abnormalities, started on heparin drip with elevated troponins. Patient states he is having worsening difficulty in breathing for past 2 weeks mild leg swelling without any chest pain. No prior history of coronary artery disease or bypass surgery. No prior history of any echocardiogram or stress test in the past patient wife is at the bedside. REVIEW OF SYSTEMS:  As per HPI, all other 12 systems reviewed and negative     Past Medical History:  No past medical history on file. Past Surgical History:  No past surgical history on file.     Allergies:  Levaquin [levofloxacin]    Past Social History:  Social History     Socioeconomic History    Marital status:      Spouse name: Not on file    Number of children: Not on file    Years of education: Not on file    Highest education level: Not on file   Occupational History    Not on file   Tobacco Use    Smoking status: Not on file    Smokeless tobacco: Not on file   Substance and Sexual Activity    Alcohol use: Not on file    Drug use: Not on file    Sexual activity: Not on file   Other Topics Concern    Not on file   Social History Narrative    Not on file     Social Determinants of Health     Financial Resource Strain: Not on file   Food Insecurity: Not on file   Transportation Needs: Not on file   Physical

## 2023-08-12 NOTE — PLAN OF CARE
The patient is a 62 y.o. male who presented to [facility] direct admit [facility] ER with PMH HTN, type II DM, hyperlipidemia, GERD, s/p partial amputation right foot, dips, complaining of abnormal labs. Endorsed dyspnea on exertion. Patient was transferred for higher level of care. Patient is to be admitted to the hospital service with consultation to nephrology. Vascular surgery consulted for permacath placement. ECHO obtained EF 35% severe anterior, anteroseptal and apical hypokinesis, mdoerate inferrior and inferospetal wall hypokinesis, Grade II DD, troponin and EKG obtained. Cardiology consulted and Heparin drip ordered. Patient assessed, resting comfortably in bed and denies chest pain at this time. Discussed with patient findings from echo along with EKG and trend troponin. He verbalizes understanding however is concerned about possible heart catherization and wishes to think about it prior to proceeding. Is agreeable to heparin drip and is initiated. Will continue to monitor.

## 2023-08-13 LAB
ALBUMIN SERPL-MCNC: 2.9 G/DL (ref 3.5–5.2)
ALP SERPL-CCNC: 136 U/L (ref 40–130)
ALT SERPL-CCNC: 18 U/L (ref 5–41)
ANION GAP SERPL CALCULATED.3IONS-SCNC: 16 MMOL/L (ref 7–19)
APTT PPP: 108.3 SEC (ref 26–36.2)
APTT PPP: 55.4 SEC (ref 26–36.2)
APTT PPP: 69.7 SEC (ref 26–36.2)
AST SERPL-CCNC: 33 U/L (ref 5–40)
BASOPHILS # BLD: 0.1 K/UL (ref 0–0.2)
BASOPHILS NFR BLD: 0.8 % (ref 0–1)
BILIRUB SERPL-MCNC: 0.6 MG/DL (ref 0.2–1.2)
BUN SERPL-MCNC: 33 MG/DL (ref 6–20)
CALCIUM SERPL-MCNC: 8.1 MG/DL (ref 8.6–10)
CHLORIDE SERPL-SCNC: 98 MMOL/L (ref 98–111)
CO2 SERPL-SCNC: 23 MMOL/L (ref 22–29)
CREAT SERPL-MCNC: 4.4 MG/DL (ref 0.5–1.2)
EOSINOPHIL # BLD: 0.3 K/UL (ref 0–0.6)
EOSINOPHIL NFR BLD: 3.8 % (ref 0–5)
ERYTHROCYTE [DISTWIDTH] IN BLOOD BY AUTOMATED COUNT: 12 % (ref 11.5–14.5)
GLUCOSE BLD-MCNC: 155 MG/DL (ref 70–99)
GLUCOSE BLD-MCNC: 214 MG/DL (ref 70–99)
GLUCOSE BLD-MCNC: 245 MG/DL (ref 70–99)
GLUCOSE BLD-MCNC: 262 MG/DL (ref 70–99)
GLUCOSE SERPL-MCNC: 168 MG/DL (ref 74–109)
HCT VFR BLD AUTO: 27.2 % (ref 42–52)
HGB BLD-MCNC: 8.8 G/DL (ref 14–18)
IMM GRANULOCYTES # BLD: 0 K/UL
LYMPHOCYTES # BLD: 1.4 K/UL (ref 1.1–4.5)
LYMPHOCYTES NFR BLD: 18.2 % (ref 20–40)
MCH RBC QN AUTO: 29.9 PG (ref 27–31)
MCHC RBC AUTO-ENTMCNC: 32.4 G/DL (ref 33–37)
MCV RBC AUTO: 92.5 FL (ref 80–94)
MONOCYTES # BLD: 1.1 K/UL (ref 0–0.9)
MONOCYTES NFR BLD: 14.8 % (ref 0–10)
NEUTROPHILS # BLD: 4.7 K/UL (ref 1.5–7.5)
NEUTS SEG NFR BLD: 61.9 % (ref 50–65)
PERFORMED ON: ABNORMAL
PLATELET # BLD AUTO: 216 K/UL (ref 130–400)
PMV BLD AUTO: 10.8 FL (ref 9.4–12.4)
POTASSIUM SERPL-SCNC: 4 MMOL/L (ref 3.5–5)
PROT SERPL-MCNC: 6.7 G/DL (ref 6.6–8.7)
RBC # BLD AUTO: 2.94 M/UL (ref 4.7–6.1)
SODIUM SERPL-SCNC: 137 MMOL/L (ref 136–145)
WBC # BLD AUTO: 7.6 K/UL (ref 4.8–10.8)

## 2023-08-13 PROCEDURE — 84155 ASSAY OF PROTEIN SERUM: CPT

## 2023-08-13 PROCEDURE — 82962 GLUCOSE BLOOD TEST: CPT

## 2023-08-13 PROCEDURE — 6360000002 HC RX W HCPCS: Performed by: INTERNAL MEDICINE

## 2023-08-13 PROCEDURE — 6360000002 HC RX W HCPCS: Performed by: STUDENT IN AN ORGANIZED HEALTH CARE EDUCATION/TRAINING PROGRAM

## 2023-08-13 PROCEDURE — 6370000000 HC RX 637 (ALT 250 FOR IP): Performed by: INTERNAL MEDICINE

## 2023-08-13 PROCEDURE — 85025 COMPLETE CBC W/AUTO DIFF WBC: CPT

## 2023-08-13 PROCEDURE — 6370000000 HC RX 637 (ALT 250 FOR IP): Performed by: SURGERY

## 2023-08-13 PROCEDURE — 36415 COLL VENOUS BLD VENIPUNCTURE: CPT

## 2023-08-13 PROCEDURE — 94760 N-INVAS EAR/PLS OXIMETRY 1: CPT

## 2023-08-13 PROCEDURE — 2580000003 HC RX 258: Performed by: SURGERY

## 2023-08-13 PROCEDURE — 84165 PROTEIN E-PHORESIS SERUM: CPT

## 2023-08-13 PROCEDURE — 1210000000 HC MED SURG R&B

## 2023-08-13 PROCEDURE — 85730 THROMBOPLASTIN TIME PARTIAL: CPT

## 2023-08-13 PROCEDURE — 80053 COMPREHEN METABOLIC PANEL: CPT

## 2023-08-13 PROCEDURE — 86160 COMPLEMENT ANTIGEN: CPT

## 2023-08-13 RX ADMIN — SUCRALFATE 1 G: 1 TABLET ORAL at 18:01

## 2023-08-13 RX ADMIN — INSULIN LISPRO 1 UNITS: 100 INJECTION, SOLUTION INTRAVENOUS; SUBCUTANEOUS at 18:01

## 2023-08-13 RX ADMIN — METOPROLOL TARTRATE 50 MG: 50 TABLET, FILM COATED ORAL at 09:26

## 2023-08-13 RX ADMIN — CETIRIZINE HYDROCHLORIDE 5 MG: 5 TABLET ORAL at 09:26

## 2023-08-13 RX ADMIN — HEPARIN SODIUM 19 UNITS/KG/HR: 10000 INJECTION, SOLUTION INTRAVENOUS at 19:33

## 2023-08-13 RX ADMIN — SUCRALFATE 1 G: 1 TABLET ORAL at 05:30

## 2023-08-13 RX ADMIN — AMLODIPINE BESYLATE 10 MG: 10 TABLET ORAL at 09:26

## 2023-08-13 RX ADMIN — HEPARIN SODIUM 19 UNITS/KG/HR: 10000 INJECTION, SOLUTION INTRAVENOUS at 05:34

## 2023-08-13 RX ADMIN — METOPROLOL TARTRATE 50 MG: 50 TABLET, FILM COATED ORAL at 20:31

## 2023-08-13 RX ADMIN — EPOETIN ALFA-EPBX 10000 UNITS: 10000 INJECTION, SOLUTION INTRAVENOUS; SUBCUTANEOUS at 13:06

## 2023-08-13 RX ADMIN — INSULIN GLARGINE 9 UNITS: 100 INJECTION, SOLUTION SUBCUTANEOUS at 09:26

## 2023-08-13 RX ADMIN — SODIUM CHLORIDE, PRESERVATIVE FREE 10 ML: 5 INJECTION INTRAVENOUS at 20:31

## 2023-08-13 RX ADMIN — FAMOTIDINE 20 MG: 20 TABLET ORAL at 20:31

## 2023-08-13 RX ADMIN — INSULIN LISPRO 2 UNITS: 100 INJECTION, SOLUTION INTRAVENOUS; SUBCUTANEOUS at 09:27

## 2023-08-13 RX ADMIN — INSULIN LISPRO 1 UNITS: 100 INJECTION, SOLUTION INTRAVENOUS; SUBCUTANEOUS at 13:06

## 2023-08-13 RX ADMIN — IRON SUCROSE 200 MG: 20 INJECTION, SOLUTION INTRAVENOUS at 13:06

## 2023-08-13 RX ADMIN — INSULIN LISPRO 2 UNITS: 100 INJECTION, SOLUTION INTRAVENOUS; SUBCUTANEOUS at 18:01

## 2023-08-13 RX ADMIN — INSULIN LISPRO 2 UNITS: 100 INJECTION, SOLUTION INTRAVENOUS; SUBCUTANEOUS at 13:06

## 2023-08-13 RX ADMIN — MONTELUKAST SODIUM 10 MG: 10 TABLET, FILM COATED ORAL at 09:26

## 2023-08-13 RX ADMIN — SUCRALFATE 1 G: 1 TABLET ORAL at 13:06

## 2023-08-13 RX ADMIN — CALCITRIOL CAPSULES 0.25 MCG 0.25 MCG: 0.25 CAPSULE ORAL at 09:26

## 2023-08-13 RX ADMIN — HEPARIN SODIUM 2000 UNITS: 1000 INJECTION INTRAVENOUS; SUBCUTANEOUS at 14:22

## 2023-08-13 RX ADMIN — ASPIRIN 81 MG: 81 TABLET, CHEWABLE ORAL at 09:26

## 2023-08-13 RX ADMIN — HEPARIN SODIUM 2000 UNITS: 1000 INJECTION INTRAVENOUS; SUBCUTANEOUS at 00:32

## 2023-08-13 RX ADMIN — ATORVASTATIN CALCIUM 40 MG: 40 TABLET, FILM COATED ORAL at 20:31

## 2023-08-13 NOTE — PLAN OF CARE
Problem: Discharge Planning  Goal: Discharge to home or other facility with appropriate resources  8/13/2023 0005 by John Monroy RN  Outcome: Progressing  8/13/2023 0005 by John Monroy RN  Outcome: Progressing  Flowsheets (Taken 8/12/2023 2007)  Discharge to home or other facility with appropriate resources: Identify barriers to discharge with patient and caregiver     Problem: Safety - Adult  Goal: Free from fall injury  8/13/2023 0005 by John Monroy RN  Outcome: Progressing  8/13/2023 0005 by John Monroy RN  Outcome: Progressing     Problem: Chronic Conditions and Co-morbidities  Goal: Patient's chronic conditions and co-morbidity symptoms are monitored and maintained or improved  8/13/2023 0005 by John Monroy RN  Outcome: Progressing  8/13/2023 0005 by John Monroy RN  Outcome: Progressing  Flowsheets (Taken 8/12/2023 2007)  Care Plan - Patient's Chronic Conditions and Co-Morbidity Symptoms are Monitored and Maintained or Improved: Monitor and assess patient's chronic conditions and comorbid symptoms for stability, deterioration, or improvement

## 2023-08-13 NOTE — PLAN OF CARE
Problem: Discharge Planning  Goal: Discharge to home or other facility with appropriate resources  Outcome: Progressing  Flowsheets (Taken 8/12/2023 2007)  Discharge to home or other facility with appropriate resources: Identify barriers to discharge with patient and caregiver     Problem: Safety - Adult  Goal: Free from fall injury  Outcome: Progressing     Problem: Chronic Conditions and Co-morbidities  Goal: Patient's chronic conditions and co-morbidity symptoms are monitored and maintained or improved  Outcome: Progressing  Flowsheets (Taken 8/12/2023 2007)  Care Plan - Patient's Chronic Conditions and Co-Morbidity Symptoms are Monitored and Maintained or Improved: Monitor and assess patient's chronic conditions and comorbid symptoms for stability, deterioration, or improvement

## 2023-08-14 LAB
ALBUMIN SERPL-MCNC: 3.1 G/DL (ref 3.5–5.2)
ALP SERPL-CCNC: 154 U/L (ref 40–130)
ALT SERPL-CCNC: 18 U/L (ref 5–41)
ANION GAP SERPL CALCULATED.3IONS-SCNC: 14 MMOL/L (ref 7–19)
APTT PPP: 68.7 SEC (ref 26–36.2)
APTT PPP: 82.1 SEC (ref 26–36.2)
AST SERPL-CCNC: 27 U/L (ref 5–40)
BASOPHILS # BLD: 0.1 K/UL (ref 0–0.2)
BASOPHILS NFR BLD: 0.6 % (ref 0–1)
BILIRUB SERPL-MCNC: 0.4 MG/DL (ref 0.2–1.2)
BUN SERPL-MCNC: 41 MG/DL (ref 6–20)
CALCIUM SERPL-MCNC: 7.9 MG/DL (ref 8.6–10)
CHLORIDE SERPL-SCNC: 95 MMOL/L (ref 98–111)
CO2 SERPL-SCNC: 23 MMOL/L (ref 22–29)
CREAT SERPL-MCNC: 5.7 MG/DL (ref 0.5–1.2)
EKG P AXIS: 45 DEGREES
EKG P-R INTERVAL: 162 MS
EKG Q-T INTERVAL: 388 MS
EKG QRS DURATION: 96 MS
EKG QTC CALCULATION (BAZETT): 449 MS
EKG T AXIS: 90 DEGREES
EOSINOPHIL # BLD: 0.5 K/UL (ref 0–0.6)
EOSINOPHIL NFR BLD: 5.3 % (ref 0–5)
ERYTHROCYTE [DISTWIDTH] IN BLOOD BY AUTOMATED COUNT: 12 % (ref 11.5–14.5)
GLUCOSE BLD-MCNC: 153 MG/DL (ref 70–99)
GLUCOSE BLD-MCNC: 184 MG/DL (ref 70–99)
GLUCOSE BLD-MCNC: 232 MG/DL (ref 70–99)
GLUCOSE BLD-MCNC: 265 MG/DL (ref 70–99)
GLUCOSE SERPL-MCNC: 263 MG/DL (ref 74–109)
HCT VFR BLD AUTO: 26.9 % (ref 42–52)
HGB BLD-MCNC: 8.8 G/DL (ref 14–18)
IMM GRANULOCYTES # BLD: 0.1 K/UL
LYMPHOCYTES # BLD: 1.4 K/UL (ref 1.1–4.5)
LYMPHOCYTES NFR BLD: 15.2 % (ref 20–40)
MCH RBC QN AUTO: 29.8 PG (ref 27–31)
MCHC RBC AUTO-ENTMCNC: 32.7 G/DL (ref 33–37)
MCV RBC AUTO: 91.2 FL (ref 80–94)
MONOCYTES # BLD: 1.4 K/UL (ref 0–0.9)
MONOCYTES NFR BLD: 15.7 % (ref 0–10)
NEUTROPHILS # BLD: 5.6 K/UL (ref 1.5–7.5)
NEUTS SEG NFR BLD: 62.6 % (ref 50–65)
PERFORMED ON: ABNORMAL
PLATELET # BLD AUTO: 235 K/UL (ref 130–400)
PMV BLD AUTO: 11.4 FL (ref 9.4–12.4)
POTASSIUM SERPL-SCNC: 3.8 MMOL/L (ref 3.5–5)
PROT SERPL-MCNC: 6.1 G/DL (ref 6.6–8.7)
RBC # BLD AUTO: 2.95 M/UL (ref 4.7–6.1)
SODIUM SERPL-SCNC: 132 MMOL/L (ref 136–145)
WBC # BLD AUTO: 8.9 K/UL (ref 4.8–10.8)

## 2023-08-14 PROCEDURE — 36415 COLL VENOUS BLD VENIPUNCTURE: CPT

## 2023-08-14 PROCEDURE — 6370000000 HC RX 637 (ALT 250 FOR IP): Performed by: SURGERY

## 2023-08-14 PROCEDURE — 82962 GLUCOSE BLOOD TEST: CPT

## 2023-08-14 PROCEDURE — 94760 N-INVAS EAR/PLS OXIMETRY 1: CPT

## 2023-08-14 PROCEDURE — 6370000000 HC RX 637 (ALT 250 FOR IP): Performed by: INTERNAL MEDICINE

## 2023-08-14 PROCEDURE — 8010000000 HC HEMODIALYSIS ACUTE INPT

## 2023-08-14 PROCEDURE — 6360000002 HC RX W HCPCS: Performed by: STUDENT IN AN ORGANIZED HEALTH CARE EDUCATION/TRAINING PROGRAM

## 2023-08-14 PROCEDURE — 2580000003 HC RX 258: Performed by: SURGERY

## 2023-08-14 PROCEDURE — 93010 ELECTROCARDIOGRAM REPORT: CPT | Performed by: INTERNAL MEDICINE

## 2023-08-14 PROCEDURE — 6360000002 HC RX W HCPCS: Performed by: INTERNAL MEDICINE

## 2023-08-14 PROCEDURE — 85025 COMPLETE CBC W/AUTO DIFF WBC: CPT

## 2023-08-14 PROCEDURE — 1210000000 HC MED SURG R&B

## 2023-08-14 PROCEDURE — 85730 THROMBOPLASTIN TIME PARTIAL: CPT

## 2023-08-14 PROCEDURE — 80053 COMPREHEN METABOLIC PANEL: CPT

## 2023-08-14 RX ORDER — HEPARIN SODIUM 1000 [USP'U]/ML
3600 INJECTION, SOLUTION INTRAVENOUS; SUBCUTANEOUS
Status: ACTIVE | OUTPATIENT
Start: 2023-08-14 | End: 2023-08-15

## 2023-08-14 RX ORDER — CARVEDILOL 12.5 MG/1
12.5 TABLET ORAL 2 TIMES DAILY
Status: DISCONTINUED | OUTPATIENT
Start: 2023-08-14 | End: 2023-08-16 | Stop reason: HOSPADM

## 2023-08-14 RX ORDER — NITROGLYCERIN 0.4 MG/1
0.4 TABLET SUBLINGUAL EVERY 5 MIN PRN
Status: DISCONTINUED | OUTPATIENT
Start: 2023-08-14 | End: 2023-08-16 | Stop reason: HOSPADM

## 2023-08-14 RX ORDER — VITAMIN B COMPLEX
2000 TABLET ORAL DAILY
Status: DISCONTINUED | OUTPATIENT
Start: 2023-08-14 | End: 2023-08-16 | Stop reason: HOSPADM

## 2023-08-14 RX ADMIN — SODIUM CHLORIDE, PRESERVATIVE FREE 10 ML: 5 INJECTION INTRAVENOUS at 19:47

## 2023-08-14 RX ADMIN — ASPIRIN 81 MG: 81 TABLET, CHEWABLE ORAL at 12:35

## 2023-08-14 RX ADMIN — CALCITRIOL CAPSULES 0.25 MCG 0.25 MCG: 0.25 CAPSULE ORAL at 12:36

## 2023-08-14 RX ADMIN — IRON SUCROSE 200 MG: 20 INJECTION, SOLUTION INTRAVENOUS at 12:35

## 2023-08-14 RX ADMIN — HEPARIN SODIUM 19 UNITS/KG/HR: 10000 INJECTION, SOLUTION INTRAVENOUS at 23:49

## 2023-08-14 RX ADMIN — FAMOTIDINE 20 MG: 20 TABLET ORAL at 19:47

## 2023-08-14 RX ADMIN — SUCRALFATE 1 G: 1 TABLET ORAL at 17:19

## 2023-08-14 RX ADMIN — Medication 2000 UNITS: at 17:19

## 2023-08-14 RX ADMIN — INSULIN LISPRO 1 UNITS: 100 INJECTION, SOLUTION INTRAVENOUS; SUBCUTANEOUS at 17:20

## 2023-08-14 RX ADMIN — METOPROLOL TARTRATE 50 MG: 50 TABLET, FILM COATED ORAL at 12:36

## 2023-08-14 RX ADMIN — INSULIN LISPRO 2 UNITS: 100 INJECTION, SOLUTION INTRAVENOUS; SUBCUTANEOUS at 12:43

## 2023-08-14 RX ADMIN — MONTELUKAST SODIUM 10 MG: 10 TABLET, FILM COATED ORAL at 12:35

## 2023-08-14 RX ADMIN — INSULIN LISPRO 2 UNITS: 100 INJECTION, SOLUTION INTRAVENOUS; SUBCUTANEOUS at 17:18

## 2023-08-14 RX ADMIN — CARVEDILOL 12.5 MG: 12.5 TABLET, FILM COATED ORAL at 17:45

## 2023-08-14 RX ADMIN — SUCRALFATE 1 G: 1 TABLET ORAL at 05:20

## 2023-08-14 RX ADMIN — HEPARIN SODIUM 19 UNITS/KG/HR: 10000 INJECTION, SOLUTION INTRAVENOUS at 10:54

## 2023-08-14 RX ADMIN — AMLODIPINE BESYLATE 10 MG: 10 TABLET ORAL at 12:35

## 2023-08-14 RX ADMIN — SUCRALFATE 1 G: 1 TABLET ORAL at 12:35

## 2023-08-14 RX ADMIN — SACUBITRIL AND VALSARTAN 1 TABLET: 24; 26 TABLET, FILM COATED ORAL at 19:46

## 2023-08-14 RX ADMIN — CETIRIZINE HYDROCHLORIDE 5 MG: 5 TABLET ORAL at 12:35

## 2023-08-14 RX ADMIN — ATORVASTATIN CALCIUM 40 MG: 40 TABLET, FILM COATED ORAL at 19:47

## 2023-08-14 RX ADMIN — INSULIN GLARGINE 9 UNITS: 100 INJECTION, SOLUTION SUBCUTANEOUS at 12:35

## 2023-08-14 NOTE — CARE COORDINATION
Kerry Mesa met with Pt at bedside at 56 PM to discuss the Pt IMM (2nd) letter and to answer any questions/concerns at this time. The Pt did not have any questions/concerns at [facility] s time. The Pt waived the four hour wait period and  signed the IMM (2nd) letter it was filed in the PT soft chart. The Sw provided the Pt with the IMM (2nd) letter for the Pt personal records.     08/14/23 1402   IMM Letter   IMM Letter given to Patient/Family/Significant other/Guardian/POA/by: Jona Gibson    IMM Letter date given: 08/14/23   IMM Letter time given: 7324

## 2023-08-14 NOTE — DISCHARGE INSTRUCTIONS
After Dialysis Catheter (PermaCath) Placement    1. Expect to be sore for a few days. You may use acetaminophen (tylenol) for this temporary discomfort. Please refer to your nephrologist for further questions about medications or dosing. 2.  A small amount of blood or drainage oozing from the insertion site is normal.  If the dressing becomes saturated with blood, hold firm and direct pressure on the insertion site and sit up at a 90 degree angle for 20 minutes. If bleeding continues, call the office. If the office is closed, you may be directed to go to the nearest emergency department for evaluation. 3.  Prevent tugging or pulling the catheter. If it is dislodged it may not work properly and may need to be replaced. 4.  Do not use ANY creams or antibiotic ointment over the insertion area. The dialysis center nurses will clean and dress your catheter each dialysis visit. 5.  Keep the catheter dry. YOU MAY NOT SHOWER. Sitting in a tub is permissible as long as the water level is below the catheter. No swimming! 6. Avoid lifting anything weighing more than 10 pounds for the next three days. Ten pounds is about the weight of two phone books or a gallon of milk. Lifting may put a strain on the incision before it has a chance to heal.  7.  DO NOT change the dressing yourself. Permacath care should be provided by your dialysis until and must be done with a sterile technique to prevent infection. PROBLEMS OR CONCERNS    Call the vascular surgery office at [phone] with any of the followin. Continued bleeding after 20 minutes of firm, direct pressure and sitting upright. If the office is closed, you may be directed to go to the nearest emergency department for evaluation. 2.  Fever or chills, redness, heat or sudden swelling around insertion site. 3.  Catheter position changes or catheter pulled out.

## 2023-08-14 NOTE — CARE COORDINATION
Pt's schedule letter received and confirmed; [facility] TTS 11:30AM; first tx Tuesday 8/15  Electronically signed by CRUZ Caldwell on 8/14/2023 at 12:30 PM

## 2023-08-14 NOTE — PLAN OF CARE
Problem: Discharge Planning  Goal: Discharge to home or other facility with appropriate resources  Outcome: Progressing  Flowsheets (Taken 8/13/2023 2034)  Discharge to home or other facility with appropriate resources: Identify barriers to discharge with patient and caregiver     Problem: Safety - Adult  Goal: Free from fall injury  Outcome: Progressing     Problem: Chronic Conditions and Co-morbidities  Goal: Patient's chronic conditions and co-morbidity symptoms are monitored and maintained or improved  Outcome: Progressing  Flowsheets (Taken 8/13/2023 2034)  Care Plan - Patient's Chronic Conditions and Co-Morbidity Symptoms are Monitored and Maintained or Improved: Monitor and assess patient's chronic conditions and comorbid symptoms for stability, deterioration, or improvement

## 2023-08-15 ENCOUNTER — APPOINTMENT (OUTPATIENT)
Dept: CARDIAC CATH/INVASIVE PROCEDURES | Age: 58
DRG: 673 | End: 2023-08-15
Attending: INTERNAL MEDICINE
Payer: MEDICARE

## 2023-08-15 LAB
ANION GAP SERPL CALCULATED.3IONS-SCNC: 13 MMOL/L (ref 7–19)
APTT PPP: 92.9 SEC (ref 26–36.2)
BASOPHILS # BLD: 0.1 K/UL (ref 0–0.2)
BASOPHILS NFR BLD: 0.8 % (ref 0–1)
BUN SERPL-MCNC: 27 MG/DL (ref 6–20)
C3 SERPL-MCNC: 139 MG/DL (ref 90–180)
C4 SERPL-MCNC: 33 MG/DL (ref 10–40)
CALCIUM SERPL-MCNC: 7.8 MG/DL (ref 8.6–10)
CHLORIDE SERPL-SCNC: 92 MMOL/L (ref 98–111)
CO2 SERPL-SCNC: 28 MMOL/L (ref 22–29)
CREAT SERPL-MCNC: 5.7 MG/DL (ref 0.5–1.2)
EOSINOPHIL # BLD: 0.6 K/UL (ref 0–0.6)
EOSINOPHIL NFR BLD: 6.9 % (ref 0–5)
ERYTHROCYTE [DISTWIDTH] IN BLOOD BY AUTOMATED COUNT: 11.9 % (ref 11.5–14.5)
GLUCOSE BLD-MCNC: 134 MG/DL (ref 70–99)
GLUCOSE BLD-MCNC: 184 MG/DL (ref 70–99)
GLUCOSE BLD-MCNC: 222 MG/DL (ref 70–99)
GLUCOSE BLD-MCNC: 248 MG/DL (ref 70–99)
GLUCOSE SERPL-MCNC: 202 MG/DL (ref 74–109)
HCT VFR BLD AUTO: 26.8 % (ref 42–52)
HGB BLD-MCNC: 8.7 G/DL (ref 14–18)
IMM GRANULOCYTES # BLD: 0.1 K/UL
LYMPHOCYTES # BLD: 2.3 K/UL (ref 1.1–4.5)
LYMPHOCYTES NFR BLD: 25.3 % (ref 20–40)
MCH RBC QN AUTO: 29.5 PG (ref 27–31)
MCHC RBC AUTO-ENTMCNC: 32.5 G/DL (ref 33–37)
MCV RBC AUTO: 90.8 FL (ref 80–94)
MONOCYTES # BLD: 1.2 K/UL (ref 0–0.9)
MONOCYTES NFR BLD: 13.3 % (ref 0–10)
NEUTROPHILS # BLD: 4.9 K/UL (ref 1.5–7.5)
NEUTS SEG NFR BLD: 52.8 % (ref 50–65)
PERFORMED ON: ABNORMAL
PLATELET # BLD AUTO: 203 K/UL (ref 130–400)
PMV BLD AUTO: 10.9 FL (ref 9.4–12.4)
POTASSIUM SERPL-SCNC: 3.7 MMOL/L (ref 3.5–5)
RBC # BLD AUTO: 2.95 M/UL (ref 4.7–6.1)
SODIUM SERPL-SCNC: 133 MMOL/L (ref 136–145)
WBC # BLD AUTO: 9.3 K/UL (ref 4.8–10.8)

## 2023-08-15 PROCEDURE — 2140000000 HC CCU INTERMEDIATE R&B

## 2023-08-15 PROCEDURE — C1894 INTRO/SHEATH, NON-LASER: HCPCS

## 2023-08-15 PROCEDURE — 6360000004 HC RX CONTRAST MEDICATION: Performed by: INTERNAL MEDICINE

## 2023-08-15 PROCEDURE — 99152 MOD SED SAME PHYS/QHP 5/>YRS: CPT

## 2023-08-15 PROCEDURE — 6370000000 HC RX 637 (ALT 250 FOR IP): Performed by: INTERNAL MEDICINE

## 2023-08-15 PROCEDURE — 6370000000 HC RX 637 (ALT 250 FOR IP)

## 2023-08-15 PROCEDURE — 6360000002 HC RX W HCPCS: Performed by: INTERNAL MEDICINE

## 2023-08-15 PROCEDURE — 2709999900 HC NON-CHARGEABLE SUPPLY

## 2023-08-15 PROCEDURE — 027135Z DILATION OF CORONARY ARTERY, TWO ARTERIES WITH TWO DRUG-ELUTING INTRALUMINAL DEVICES, PERCUTANEOUS APPROACH: ICD-10-PCS | Performed by: INTERNAL MEDICINE

## 2023-08-15 PROCEDURE — B2111ZZ FLUOROSCOPY OF MULTIPLE CORONARY ARTERIES USING LOW OSMOLAR CONTRAST: ICD-10-PCS | Performed by: INTERNAL MEDICINE

## 2023-08-15 PROCEDURE — 82962 GLUCOSE BLOOD TEST: CPT

## 2023-08-15 PROCEDURE — 6360000002 HC RX W HCPCS

## 2023-08-15 PROCEDURE — C1887 CATHETER, GUIDING: HCPCS

## 2023-08-15 PROCEDURE — C1769 GUIDE WIRE: HCPCS

## 2023-08-15 PROCEDURE — 92928 PRQ TCAT PLMT NTRAC ST 1 LES: CPT

## 2023-08-15 PROCEDURE — 8010000000 HC HEMODIALYSIS ACUTE INPT

## 2023-08-15 PROCEDURE — 93005 ELECTROCARDIOGRAM TRACING: CPT | Performed by: INTERNAL MEDICINE

## 2023-08-15 PROCEDURE — 4A023N7 MEASUREMENT OF CARDIAC SAMPLING AND PRESSURE, LEFT HEART, PERCUTANEOUS APPROACH: ICD-10-PCS | Performed by: INTERNAL MEDICINE

## 2023-08-15 PROCEDURE — C1725 CATH, TRANSLUMIN NON-LASER: HCPCS

## 2023-08-15 PROCEDURE — 93458 L HRT ARTERY/VENTRICLE ANGIO: CPT

## 2023-08-15 PROCEDURE — 94760 N-INVAS EAR/PLS OXIMETRY 1: CPT

## 2023-08-15 PROCEDURE — 99153 MOD SED SAME PHYS/QHP EA: CPT

## 2023-08-15 PROCEDURE — 6370000000 HC RX 637 (ALT 250 FOR IP): Performed by: SURGERY

## 2023-08-15 PROCEDURE — 80048 BASIC METABOLIC PNL TOTAL CA: CPT

## 2023-08-15 PROCEDURE — 5A1D70Z PERFORMANCE OF URINARY FILTRATION, INTERMITTENT, LESS THAN 6 HOURS PER DAY: ICD-10-PCS | Performed by: INTERNAL MEDICINE

## 2023-08-15 PROCEDURE — 36415 COLL VENOUS BLD VENIPUNCTURE: CPT

## 2023-08-15 PROCEDURE — B2151ZZ FLUOROSCOPY OF LEFT HEART USING LOW OSMOLAR CONTRAST: ICD-10-PCS | Performed by: INTERNAL MEDICINE

## 2023-08-15 PROCEDURE — 85025 COMPLETE CBC W/AUTO DIFF WBC: CPT

## 2023-08-15 PROCEDURE — C1874 STENT, COATED/COV W/DEL SYS: HCPCS

## 2023-08-15 PROCEDURE — 92921 HC PRQ CARDIAC ANGIO ADDL ART: CPT

## 2023-08-15 PROCEDURE — 85730 THROMBOPLASTIN TIME PARTIAL: CPT

## 2023-08-15 RX ORDER — IODIXANOL 320 MG/ML
184 INJECTION, SOLUTION INTRAVASCULAR
Status: COMPLETED | OUTPATIENT
Start: 2023-08-15 | End: 2023-08-15

## 2023-08-15 RX ORDER — CLOPIDOGREL BISULFATE 75 MG/1
75 TABLET ORAL DAILY
Status: DISCONTINUED | OUTPATIENT
Start: 2023-08-16 | End: 2023-08-16 | Stop reason: HOSPADM

## 2023-08-15 RX ORDER — HEPARIN SODIUM 1000 [USP'U]/ML
3600 INJECTION, SOLUTION INTRAVENOUS; SUBCUTANEOUS
Status: COMPLETED | OUTPATIENT
Start: 2023-08-15 | End: 2023-08-15

## 2023-08-15 RX ADMIN — INSULIN LISPRO 2 UNITS: 100 INJECTION, SOLUTION INTRAVENOUS; SUBCUTANEOUS at 12:17

## 2023-08-15 RX ADMIN — SACUBITRIL AND VALSARTAN 1 TABLET: 24; 26 TABLET, FILM COATED ORAL at 22:08

## 2023-08-15 RX ADMIN — IODIXANOL 184 ML: 320 INJECTION, SOLUTION INTRAVASCULAR at 09:41

## 2023-08-15 RX ADMIN — HEPARIN SODIUM 3600 UNITS: 1000 INJECTION INTRAVENOUS; SUBCUTANEOUS at 16:45

## 2023-08-15 RX ADMIN — IRON SUCROSE 200 MG: 20 INJECTION, SOLUTION INTRAVENOUS at 12:17

## 2023-08-15 RX ADMIN — SUCRALFATE 1 G: 1 TABLET ORAL at 05:55

## 2023-08-15 RX ADMIN — MONTELUKAST SODIUM 10 MG: 10 TABLET, FILM COATED ORAL at 07:58

## 2023-08-15 RX ADMIN — CALCITRIOL CAPSULES 0.25 MCG 0.25 MCG: 0.25 CAPSULE ORAL at 07:59

## 2023-08-15 RX ADMIN — AMLODIPINE BESYLATE 10 MG: 10 TABLET ORAL at 07:59

## 2023-08-15 RX ADMIN — FAMOTIDINE 20 MG: 20 TABLET ORAL at 22:08

## 2023-08-15 RX ADMIN — ASPIRIN 81 MG: 81 TABLET, CHEWABLE ORAL at 07:58

## 2023-08-15 RX ADMIN — Medication 2000 UNITS: at 07:59

## 2023-08-15 RX ADMIN — INSULIN GLARGINE 9 UNITS: 100 INJECTION, SOLUTION SUBCUTANEOUS at 07:59

## 2023-08-15 RX ADMIN — SUCRALFATE 1 G: 1 TABLET ORAL at 18:13

## 2023-08-15 RX ADMIN — CARVEDILOL 12.5 MG: 12.5 TABLET, FILM COATED ORAL at 07:58

## 2023-08-15 RX ADMIN — SUCRALFATE 1 G: 1 TABLET ORAL at 12:18

## 2023-08-15 RX ADMIN — CARVEDILOL 12.5 MG: 12.5 TABLET, FILM COATED ORAL at 22:08

## 2023-08-15 RX ADMIN — SACUBITRIL AND VALSARTAN 1 TABLET: 24; 26 TABLET, FILM COATED ORAL at 07:58

## 2023-08-15 RX ADMIN — CETIRIZINE HYDROCHLORIDE 5 MG: 5 TABLET ORAL at 07:58

## 2023-08-15 RX ADMIN — ATORVASTATIN CALCIUM 40 MG: 40 TABLET, FILM COATED ORAL at 22:08

## 2023-08-15 NOTE — PLAN OF CARE
Problem: Discharge Planning  Goal: Discharge to home or other facility with appropriate resources  Outcome: Progressing  Flowsheets  Taken 8/14/2023 1950 by Jesse Baca RN  Discharge to home or other facility with appropriate resources: Identify barriers to discharge with patient and caregiver  Taken 8/14/2023 1229 by Shree Smith RN  Discharge to home or other facility with appropriate resources: Identify barriers to discharge with patient and caregiver     Problem: Safety - Adult  Goal: Free from fall injury  Outcome: Progressing     Problem: Chronic Conditions and Co-morbidities  Goal: Patient's chronic conditions and co-morbidity symptoms are monitored and maintained or improved  Outcome: Progressing  Flowsheets  Taken 8/14/2023 1950 by Jesse Baca RN  Care Plan - Patient's Chronic Conditions and Co-Morbidity Symptoms are Monitored and Maintained or Improved: Monitor and assess patient's chronic conditions and comorbid symptoms for stability, deterioration, or improvement  Taken 8/14/2023 1229 by Shree Smith RN  Care Plan - Patient's Chronic Conditions and Co-Morbidity Symptoms are Monitored and Maintained or Improved: Monitor and assess patient's chronic conditions and comorbid symptoms for stability, deterioration, or improvement

## 2023-08-15 NOTE — PROCEDURES
Cardiac catheterization preliminary note:    Double vessel disease with mid LAD 90% stenosis with thrombus and dominant circumflex with mid 70% stenosis. LVEDP 25 mmHg. Successful PCI to mid LAD and mid circumflex with RITIKA x2. Plan: Medical management. Plavix uninterrupted for minimum 6 months.

## 2023-08-15 NOTE — PLAN OF CARE
Problem: Discharge Planning  Goal: Discharge to home or other facility with appropriate resources  8/15/2023 1004 by Shaunna Marie RN  Outcome: Progressing  8/14/2023 2231 by Jesse Baca RN  Outcome: Progressing  Flowsheets  Taken 8/14/2023 1950 by Jesse Baca RN  Discharge to home or other facility with appropriate resources: Identify barriers to discharge with patient and caregiver  Taken 8/14/2023 1229 by Shree Smith RN  Discharge to home or other facility with appropriate resources: Identify barriers to discharge with patient and caregiver     Problem: Safety - Adult  Goal: Free from fall injury  8/15/2023 1004 by Shaunna Marie RN  Outcome: Progressing  8/14/2023 2231 by Jesse Baca RN  Outcome: Progressing     Problem: Chronic Conditions and Co-morbidities  Goal: Patient's chronic conditions and co-morbidity symptoms are monitored and maintained or improved  8/15/2023 1004 by Shaunna Marie RN  Outcome: Progressing  8/14/2023 2231 by Jesse Baca RN  Outcome: Progressing  Flowsheets  Taken 8/14/2023 1950 by Jesse Baca RN  Care Plan - Patient's Chronic Conditions and Co-Morbidity Symptoms are Monitored and Maintained or Improved: Monitor and assess patient's chronic conditions and comorbid symptoms for stability, deterioration, or improvement  Taken 8/14/2023 1229 by Shree Smith RN  Care Plan - Patient's Chronic Conditions and Co-Morbidity Symptoms are Monitored and Maintained or Improved: Monitor and assess patient's chronic conditions and comorbid symptoms for stability, deterioration, or improvement

## 2023-08-16 VITALS
HEIGHT: 70 IN | WEIGHT: 213.38 LBS | RESPIRATION RATE: 18 BRPM | BODY MASS INDEX: 30.55 KG/M2 | HEART RATE: 78 BPM | SYSTOLIC BLOOD PRESSURE: 121 MMHG | OXYGEN SATURATION: 98 % | TEMPERATURE: 97.7 F | DIASTOLIC BLOOD PRESSURE: 70 MMHG

## 2023-08-16 LAB
ALBUMIN SERPL-MCNC: 2.88 G/DL (ref 3.75–5.01)
ALPHA1 GLOB SERPL ELPH-MCNC: 0.7 G/DL (ref 0.19–0.46)
ALPHA2 GLOB SERPL ELPH-MCNC: 1.26 G/DL (ref 0.48–1.05)
ANION GAP SERPL CALCULATED.3IONS-SCNC: 14 MMOL/L (ref 7–19)
APTT PPP: 39.6 SEC (ref 26–36.2)
B-GLOBULIN SERPL ELPH-MCNC: 1.06 G/DL (ref 0.48–1.1)
BASOPHILS # BLD: 0.1 K/UL (ref 0–0.2)
BASOPHILS NFR BLD: 0.7 % (ref 0–1)
BUN SERPL-MCNC: 18 MG/DL (ref 6–20)
CALCIUM SERPL-MCNC: 8.5 MG/DL (ref 8.6–10)
CHLORIDE SERPL-SCNC: 94 MMOL/L (ref 98–111)
CO2 SERPL-SCNC: 27 MMOL/L (ref 22–29)
CREAT SERPL-MCNC: 4.9 MG/DL (ref 0.5–1.2)
EKG P AXIS: 47 DEGREES
EKG P-R INTERVAL: 168 MS
EKG Q-T INTERVAL: 450 MS
EKG QRS DURATION: 102 MS
EKG QTC CALCULATION (BAZETT): 472 MS
EKG T AXIS: 135 DEGREES
EOSINOPHIL # BLD: 0.5 K/UL (ref 0–0.6)
EOSINOPHIL NFR BLD: 4.9 % (ref 0–5)
ERYTHROCYTE [DISTWIDTH] IN BLOOD BY AUTOMATED COUNT: 11.9 % (ref 11.5–14.5)
GAMMA GLOB SERPL ELPH-MCNC: 1.01 G/DL (ref 0.62–1.51)
GLUCOSE BLD-MCNC: 178 MG/DL (ref 70–99)
GLUCOSE SERPL-MCNC: 132 MG/DL (ref 74–109)
HCT VFR BLD AUTO: 29.3 % (ref 42–52)
HGB BLD-MCNC: 9.7 G/DL (ref 14–18)
IMM GRANULOCYTES # BLD: 0.1 K/UL
INTERPRETATION SERPL IFE-IMP: ABNORMAL
LYMPHOCYTES # BLD: 1.6 K/UL (ref 1.1–4.5)
LYMPHOCYTES NFR BLD: 15.2 % (ref 20–40)
MCH RBC QN AUTO: 29.8 PG (ref 27–31)
MCHC RBC AUTO-ENTMCNC: 33.1 G/DL (ref 33–37)
MCV RBC AUTO: 90.2 FL (ref 80–94)
MONOCYTES # BLD: 1.4 K/UL (ref 0–0.9)
MONOCYTES NFR BLD: 13.7 % (ref 0–10)
NEUTROPHILS # BLD: 6.6 K/UL (ref 1.5–7.5)
NEUTS SEG NFR BLD: 64.8 % (ref 50–65)
PERFORMED ON: ABNORMAL
PLATELET # BLD AUTO: 255 K/UL (ref 130–400)
PMV BLD AUTO: 10.6 FL (ref 9.4–12.4)
POTASSIUM SERPL-SCNC: 3.8 MMOL/L (ref 3.5–5)
PROT SERPL-MCNC: 6.9 G/DL (ref 6.3–8.2)
PROTEIN ELECTROPHORESIS, SERUM: ABNORMAL
RBC # BLD AUTO: 3.25 M/UL (ref 4.7–6.1)
SODIUM SERPL-SCNC: 135 MMOL/L (ref 136–145)
WBC # BLD AUTO: 10.2 K/UL (ref 4.8–10.8)

## 2023-08-16 PROCEDURE — 93010 ELECTROCARDIOGRAM REPORT: CPT | Performed by: INTERNAL MEDICINE

## 2023-08-16 PROCEDURE — 80048 BASIC METABOLIC PNL TOTAL CA: CPT

## 2023-08-16 PROCEDURE — 6370000000 HC RX 637 (ALT 250 FOR IP): Performed by: INTERNAL MEDICINE

## 2023-08-16 PROCEDURE — 85025 COMPLETE CBC W/AUTO DIFF WBC: CPT

## 2023-08-16 PROCEDURE — 94760 N-INVAS EAR/PLS OXIMETRY 1: CPT

## 2023-08-16 PROCEDURE — 36415 COLL VENOUS BLD VENIPUNCTURE: CPT

## 2023-08-16 PROCEDURE — 6370000000 HC RX 637 (ALT 250 FOR IP): Performed by: SURGERY

## 2023-08-16 PROCEDURE — 2580000003 HC RX 258: Performed by: SURGERY

## 2023-08-16 PROCEDURE — 85730 THROMBOPLASTIN TIME PARTIAL: CPT

## 2023-08-16 PROCEDURE — 82962 GLUCOSE BLOOD TEST: CPT

## 2023-08-16 RX ORDER — FAMOTIDINE 40 MG/1
20 TABLET, FILM COATED ORAL DAILY PRN
Qty: 30 TABLET | Refills: 3 | Status: SHIPPED
Start: 2023-08-16

## 2023-08-16 RX ORDER — ATORVASTATIN CALCIUM 40 MG/1
40 TABLET, FILM COATED ORAL NIGHTLY
Qty: 90 TABLET | Refills: 0 | Status: SHIPPED | OUTPATIENT
Start: 2023-08-16

## 2023-08-16 RX ORDER — ASPIRIN 81 MG/1
81 TABLET, CHEWABLE ORAL DAILY
Qty: 90 TABLET | Refills: 3 | Status: SHIPPED | OUTPATIENT
Start: 2023-08-17

## 2023-08-16 RX ORDER — CLOPIDOGREL BISULFATE 75 MG/1
75 TABLET ORAL DAILY
Qty: 90 TABLET | Refills: 3 | Status: SHIPPED | OUTPATIENT
Start: 2023-08-17

## 2023-08-16 RX ORDER — CARVEDILOL 12.5 MG/1
12.5 TABLET ORAL 2 TIMES DAILY
Qty: 180 TABLET | Refills: 0 | Status: SHIPPED | OUTPATIENT
Start: 2023-08-16

## 2023-08-16 RX ORDER — CALCITRIOL 0.25 UG/1
0.25 CAPSULE, LIQUID FILLED ORAL DAILY
Qty: 30 CAPSULE | Refills: 0 | Status: SHIPPED | OUTPATIENT
Start: 2023-08-17

## 2023-08-16 RX ORDER — CLOPIDOGREL BISULFATE 75 MG/1
75 TABLET ORAL DAILY
Qty: 90 TABLET | Refills: 1 | Status: SHIPPED | OUTPATIENT
Start: 2023-08-17 | End: 2023-08-16 | Stop reason: SDUPTHER

## 2023-08-16 RX ADMIN — CETIRIZINE HYDROCHLORIDE 5 MG: 5 TABLET ORAL at 08:03

## 2023-08-16 RX ADMIN — SACUBITRIL AND VALSARTAN 1 TABLET: 24; 26 TABLET, FILM COATED ORAL at 08:03

## 2023-08-16 RX ADMIN — SUCRALFATE 1 G: 1 TABLET ORAL at 08:03

## 2023-08-16 RX ADMIN — INSULIN GLARGINE 9 UNITS: 100 INJECTION, SOLUTION SUBCUTANEOUS at 08:05

## 2023-08-16 RX ADMIN — ASPIRIN 81 MG: 81 TABLET, CHEWABLE ORAL at 08:03

## 2023-08-16 RX ADMIN — CARVEDILOL 12.5 MG: 12.5 TABLET, FILM COATED ORAL at 08:04

## 2023-08-16 RX ADMIN — CALCITRIOL CAPSULES 0.25 MCG 0.25 MCG: 0.25 CAPSULE ORAL at 08:03

## 2023-08-16 RX ADMIN — MONTELUKAST SODIUM 10 MG: 10 TABLET, FILM COATED ORAL at 08:03

## 2023-08-16 RX ADMIN — Medication 2000 UNITS: at 08:03

## 2023-08-16 RX ADMIN — SODIUM CHLORIDE, PRESERVATIVE FREE 10 ML: 5 INJECTION INTRAVENOUS at 08:04

## 2023-08-16 RX ADMIN — CLOPIDOGREL BISULFATE 75 MG: 75 TABLET ORAL at 08:03

## 2023-08-16 RX ADMIN — AMLODIPINE BESYLATE 10 MG: 10 TABLET ORAL at 08:03

## 2023-08-16 NOTE — DISCHARGE SUMMARY
Discharge Summary    NAME: Colton Goodman  :  1965  MRN:  [id number]    Admit date:  8/10/2023  Discharge date:    2023    Advance Directive: Full Code    Consults: cardiology and nephrology    Primary Care Physician:  Rod Paez DO    Discharge Diagnoses:  Principal Problem:    Acute kidney injury superimposed on chronic kidney disease, dialysis dependent  Active Problems:  NSTEMI  Acute systolic heart failure  Volume overload    Dyspnea on exertion    Anemia of chronic disease  Iron deficiency anemia    Type II diabetes mellitus ([address])          Significant Diagnostic Studies:   CT ABDOMEN PELVIS WO CONTRAST Additional Contrast? None    Result Date: 2023  EXAM: CT ABDOMEN AND PELVIS WITHOUT CONTRAST  HISTORY: The LAD cough, nausea, vomiting, decreased appetite. TECHNIQUE: CT acquisition of the abdomen and pelvis from the lower thorax through the pelvis without IV contrast administration. 2-D coronal and sagittal reformatted images were obtained from the axial source images. CT Dose Reduction Techniques Performed: Yes. IV Contrast: None. Oral Contrast: None. COMPARISON: None. FINDINGS:  Evaluation is limited without intravenous contrast.  Lung bases: Small bilateral effusions. Multifocal bilateral patchy and ground-glass infiltrates consistent with an infectious/inflammatory process. Mild coronary calcification. Trace pericardial effusion or thickening. Liver: Normal size and morphology. No mass. Gallbladder and bile ducts: No cholelithiasis. No gallbladder wall thickening or pericholecystic fluid. No ductal dilatation. Pancreas: No acute pancreatitis. No ductal dilation. Spleen: No splenomegaly. Adrenals: No mass. Right kidney: No stones or hydronephrosis. Left kidney: No stones or hydronephrosis. Bowel and mesentery: No obstruction or ileus. No mucosal thickening. Appendix: No evidence for acute appendicitis. Peritoneal Cavity: No ascites. No free air.  No fluid

## 2023-08-17 NOTE — CONSULTS
Cardiac Rehab MI/PTCA/Stent education packet was sent to the patient's address on record. Handouts titled; \"Home Instructions Following a Cardiac Event\", \"A Healthy Heart: Care Instructions\",  \"Cardiac Diet/Low Cholesterol\", \"Cardiac Rehabilitation: An Individualized Supervised Program For You\" and a [facility] Cardiac & Pulmonary Rehabilitation brochure were included. Patient was instructed to contact [facility] or [facility] to enroll in Phase II Outpatient Cardiac Rehab.

## 2023-09-05 ENCOUNTER — OFFICE VISIT (OUTPATIENT)
Dept: VASCULAR SURGERY | Age: 58
End: 2023-09-05
Payer: MEDICARE

## 2023-09-05 VITALS
DIASTOLIC BLOOD PRESSURE: 60 MMHG | WEIGHT: 213 LBS | BODY MASS INDEX: 30.49 KG/M2 | TEMPERATURE: 97.4 F | SYSTOLIC BLOOD PRESSURE: 120 MMHG | HEART RATE: 82 BPM | OXYGEN SATURATION: 99 % | HEIGHT: 70 IN

## 2023-09-05 DIAGNOSIS — N18.6 ESRD (END STAGE RENAL DISEASE) (HCC): Primary | ICD-10-CM

## 2023-09-05 DIAGNOSIS — Z01.818 PRE-OP TESTING: ICD-10-CM

## 2023-09-05 PROCEDURE — 4004F PT TOBACCO SCREEN RCVD TLK: CPT | Performed by: NURSE PRACTITIONER

## 2023-09-05 PROCEDURE — 99213 OFFICE O/P EST LOW 20 MIN: CPT | Performed by: NURSE PRACTITIONER

## 2023-09-05 PROCEDURE — G8417 CALC BMI ABV UP PARAM F/U: HCPCS | Performed by: NURSE PRACTITIONER

## 2023-09-05 PROCEDURE — 1111F DSCHRG MED/CURRENT MED MERGE: CPT | Performed by: NURSE PRACTITIONER

## 2023-09-05 PROCEDURE — 3017F COLORECTAL CA SCREEN DOC REV: CPT | Performed by: NURSE PRACTITIONER

## 2023-09-05 PROCEDURE — G8427 DOCREV CUR MEDS BY ELIG CLIN: HCPCS | Performed by: NURSE PRACTITIONER

## 2023-09-05 NOTE — PROGRESS NOTES
for atherosclerosis of all vascular beds have been reviewed with the patient including:  Family history, tobacco abuse in all forms, elevated cholesterol, hyperlipidemia, and diabetes. Reviewed on this visit: recent hospitalization notes from 2 weeks prior    Options have been discussed with the patient including continued medical management vs. proceeding with AVF when declared chronic. Patient has opted to proceed with this. Risks have been discussed with the patient including but not limited to MI, death, CVA, bleed, nerve injury, steal syndrome, and infection. ASSESSMENT/PLAN:  1. ESRD (end stage renal disease) ([address])    1. ESRD (end stage renal disease) ([address])      (Acute on chronic)    Recommend no smoking   Proceed with vein mapping  Then, when goes from acute to chronic can proceed with AV fistula            Call with any increased venous pressure, arterial pressure, infiltrations, decreased flow rate, or post procedure bleeding. Let us know if you experience any hand pain or ulcers of that hand   An electronic signature was used to authenticate this note.     --CESIA Kaur

## 2023-09-08 ENCOUNTER — HOSPITAL ENCOUNTER (OUTPATIENT)
Dept: VASCULAR LAB | Age: 58
Discharge: HOME | End: 2023-09-08
Payer: MEDICARE

## 2023-09-08 DIAGNOSIS — N18.6 ESRD (END STAGE RENAL DISEASE) (HCC): ICD-10-CM

## 2023-09-08 DIAGNOSIS — Z01.818 PRE-OP TESTING: ICD-10-CM

## 2023-09-08 PROCEDURE — 93985 DUP-SCAN HEMO COMPL BI STD: CPT

## 2023-09-11 ENCOUNTER — TELEPHONE (OUTPATIENT)
Dept: VASCULAR SURGERY | Age: 58
End: 2023-09-11

## 2023-09-11 NOTE — TELEPHONE ENCOUNTER
Called and left the message for the patient and let him know of the upcoming date and time of his appt in November. Also, left my office number should he have questions.            ----- Message from CESIA Griffin sent at 9/11/2023  6:26 AM CDT -----  Please let him know he does appear to have usuable vein for a fistula. It looks like he will transfer to acute kidney disease to chronic in NOV.   Make him an appt the second week of November to discuss fistula creation

## 2023-09-13 ENCOUNTER — OFFICE VISIT (OUTPATIENT)
Dept: CARDIOLOGY CLINIC | Age: 58
End: 2023-09-13
Payer: MEDICARE

## 2023-09-13 VITALS
SYSTOLIC BLOOD PRESSURE: 124 MMHG | DIASTOLIC BLOOD PRESSURE: 78 MMHG | WEIGHT: 217 LBS | HEART RATE: 90 BPM | HEIGHT: 70 IN | OXYGEN SATURATION: 99 % | BODY MASS INDEX: 31.07 KG/M2

## 2023-09-13 DIAGNOSIS — I25.10 CORONARY ARTERY DISEASE INVOLVING NATIVE CORONARY ARTERY OF NATIVE HEART WITHOUT ANGINA PECTORIS: Primary | ICD-10-CM

## 2023-09-13 DIAGNOSIS — E78.5 DYSLIPIDEMIA: ICD-10-CM

## 2023-09-13 DIAGNOSIS — I10 ESSENTIAL HYPERTENSION: ICD-10-CM

## 2023-09-13 DIAGNOSIS — I50.22 CHRONIC SYSTOLIC HEART FAILURE (HCC): ICD-10-CM

## 2023-09-13 PROCEDURE — G8427 DOCREV CUR MEDS BY ELIG CLIN: HCPCS | Performed by: NURSE PRACTITIONER

## 2023-09-13 PROCEDURE — 1111F DSCHRG MED/CURRENT MED MERGE: CPT | Performed by: NURSE PRACTITIONER

## 2023-09-13 PROCEDURE — 3078F DIAST BP <80 MM HG: CPT | Performed by: NURSE PRACTITIONER

## 2023-09-13 PROCEDURE — 3074F SYST BP LT 130 MM HG: CPT | Performed by: NURSE PRACTITIONER

## 2023-09-13 PROCEDURE — 3017F COLORECTAL CA SCREEN DOC REV: CPT | Performed by: NURSE PRACTITIONER

## 2023-09-13 PROCEDURE — 99214 OFFICE O/P EST MOD 30 MIN: CPT | Performed by: NURSE PRACTITIONER

## 2023-09-13 PROCEDURE — G8417 CALC BMI ABV UP PARAM F/U: HCPCS | Performed by: NURSE PRACTITIONER

## 2023-09-13 PROCEDURE — 4004F PT TOBACCO SCREEN RCVD TLK: CPT | Performed by: NURSE PRACTITIONER

## 2023-09-13 ASSESSMENT — ENCOUNTER SYMPTOMS
SHORTNESS OF BREATH: 1
CHEST TIGHTNESS: 0
COUGH: 0
WHEEZING: 0
SORE THROAT: 0

## 2023-09-19 ENCOUNTER — TELEPHONE (OUTPATIENT)
Dept: VASCULAR SURGERY | Age: 58
End: 2023-09-19

## 2023-09-19 NOTE — TELEPHONE ENCOUNTER
----- Message from CESIA Rene sent at 9/19/2023  6:59 AM CDT -----  Please let patient know he does have fistula options. I have talked to dialysis and he will not go from acute renal failure to chronic until November 17th. Please make him an appt for then to discuss fistula creation.

## 2023-11-29 ENCOUNTER — TELEPHONE (OUTPATIENT)
Dept: CARDIOLOGY CLINIC | Age: 58
End: 2023-11-29

## 2023-11-29 NOTE — TELEPHONE ENCOUNTER
Date: TBD    Cardiologist: Rachel Vines    Procedure: cataract surgery    Surgeon: Krzysztof Chanel    Last Office Visit: 9/13/23  Reason for office visit and medical concerns addressed at this office visit: cad, dm,     Testing Performed and Date of Service:  8/10/23 Cath Double vessel disease with culprit mid LAD thrombotic stenosis and   stenotic codominant mid circumflex. Moderate LV systolic dysfunction with anterolateral and apical   hypokinesis. Successful PCI to mid LAD (3.5 x 18 mm Jamie frontier) utilizing   drug-eluting stent. Successful PCI to mid circumflex with drug-eluting stent (4.0 x 8 mm [place]   frontier) with PTCA of OM1 through stent struts. Recommendations      Complete tobacco cessation. Medical management of coronary disease and LV   dysfunction. Plavix uninterrupted for 6 months. RCRI = 6.6   METs 5    Current Medications: coreg, aspirin, pepcid, atorvastatin, plavix, iron, singulair, lasix, insulin    Is the patient currently taking an anticoagulant? If so, what is the diagnosis the patient has been given to warrant the need for the anticoagulant?  Plavix for RITIKA 8/10/23    Additional Notes: requesting cardiac clearance NO med hold

## 2023-12-01 NOTE — TELEPHONE ENCOUNTER
Johnny Segura MD  You 17 hours ago (3:51 PM)       May proceed with low risk cataract surgery as clinically indicated. Plavix cannot be held for minimum 6 months for elective procedures with recent PCI 8/2023.

## 2023-12-13 ENCOUNTER — TELEPHONE (OUTPATIENT)
Dept: CARDIOLOGY CLINIC | Age: 58
End: 2023-12-13

## 2023-12-14 ENCOUNTER — OFFICE VISIT (OUTPATIENT)
Dept: CARDIOLOGY CLINIC | Age: 58
End: 2023-12-14
Payer: MEDICARE

## 2023-12-14 VITALS
OXYGEN SATURATION: 98 % | BODY MASS INDEX: 32.07 KG/M2 | WEIGHT: 224 LBS | HEART RATE: 76 BPM | SYSTOLIC BLOOD PRESSURE: 126 MMHG | HEIGHT: 70 IN | DIASTOLIC BLOOD PRESSURE: 78 MMHG

## 2023-12-14 DIAGNOSIS — E78.5 DYSLIPIDEMIA: ICD-10-CM

## 2023-12-14 DIAGNOSIS — I10 ESSENTIAL HYPERTENSION: ICD-10-CM

## 2023-12-14 DIAGNOSIS — I50.22 CHRONIC SYSTOLIC HEART FAILURE (HCC): ICD-10-CM

## 2023-12-14 DIAGNOSIS — I25.10 CORONARY ARTERY DISEASE INVOLVING NATIVE CORONARY ARTERY OF NATIVE HEART WITHOUT ANGINA PECTORIS: Primary | ICD-10-CM

## 2023-12-14 PROCEDURE — 3017F COLORECTAL CA SCREEN DOC REV: CPT | Performed by: NURSE PRACTITIONER

## 2023-12-14 PROCEDURE — 4004F PT TOBACCO SCREEN RCVD TLK: CPT | Performed by: NURSE PRACTITIONER

## 2023-12-14 PROCEDURE — 3078F DIAST BP <80 MM HG: CPT | Performed by: NURSE PRACTITIONER

## 2023-12-14 PROCEDURE — G8417 CALC BMI ABV UP PARAM F/U: HCPCS | Performed by: NURSE PRACTITIONER

## 2023-12-14 PROCEDURE — G8427 DOCREV CUR MEDS BY ELIG CLIN: HCPCS | Performed by: NURSE PRACTITIONER

## 2023-12-14 PROCEDURE — 99214 OFFICE O/P EST MOD 30 MIN: CPT | Performed by: NURSE PRACTITIONER

## 2023-12-14 PROCEDURE — 3074F SYST BP LT 130 MM HG: CPT | Performed by: NURSE PRACTITIONER

## 2023-12-14 PROCEDURE — G8484 FLU IMMUNIZE NO ADMIN: HCPCS | Performed by: NURSE PRACTITIONER

## 2023-12-14 RX ORDER — BENZONATATE 100 MG/1
CAPSULE ORAL
COMMUNITY
Start: 2023-12-11

## 2023-12-14 RX ORDER — CEFUROXIME AXETIL 500 MG/1
TABLET ORAL
COMMUNITY
Start: 2023-12-11

## 2023-12-14 NOTE — PROGRESS NOTES
MEDICAL DECISION MAKING             Cardiac Specific Problem / Diagnosis  Discussion and Data Reviewed Diagnostic Procedures Ordered Management Options Selected           1. Chronic systolic congestive heart failure  Stable   Review and summation of old records:    No overt signs of failure. Been 90 days since stent placement. He has been on Coreg, Lasix. Will order 2D echo to evaluate current EF. Last EF 35%. Yes Continue current medications:     Yes           2. CAD  Stable   No chest pain on aspirin, Lipitor, Coreg, Plavix. No Continue current medications:    Yes           3. Hypertension Stable Blood pressure 126/78. O2 sat 98%. Patient is on Coreg 12.5 mg twice daily. Lasix 40 mg daily. No Continue current medications: Yes                     Orders Placed This Encounter   Procedures    ECHO Complete 2D W Doppler W Color     Order Specific Question:   Reason for exam:     Answer:   check EF     No orders of the defined types were placed in this encounter. Discussed with patient and spouse. Return in about 6 months (around 6/14/2024) for Routine with [place] . I greatly appreciate the opportunity to meet Chente Wilkinson and your confidence in allowing me to participate in his cardiovascular care. Cathleen Sandhoff, APRN - NP  12/14/2023 10:59 AM CST                    This dictation was generated by voice recognition computer software. Although all attempts are made to edit dictation for accuracy, there may be errors in the transcription that are not intended.

## 2023-12-14 NOTE — PATIENT INSTRUCTIONS
[place] at the [facility] and [place] located on the first floor of [address] [place] [address] through hospital main entrance and turn immediately to your left. Date/Time:     Patient's contact number:  [phone] (home)     Echocardiogram -  No prep. Takes approximately 30 min. An echocardiogram uses sound waves to produce images of your heart. This commonly used test allows your doctor to see how your heart is beating and pumping blood. Your doctor can use the images from an echocardiogram to identify various abnormalities in the heart muscle and valves. This test has 2 parts: You will be asked to disrobe from the waist up and given a gown to wear. The technologist will then hook up an EKG monitor to you for the entire exam.   You will then have an ultrasound of your heart (echocardiogram) to assess the heart muscle, heart valves and heart function. You may eat and take any medicines before the exam.     If you need to change your appointment, please call outpatient scheduling at [phone].

## 2024-02-29 ENCOUNTER — OFFICE VISIT (OUTPATIENT)
Dept: VASCULAR SURGERY | Age: 59
End: 2024-02-29
Payer: MEDICARE

## 2024-02-29 VITALS
OXYGEN SATURATION: 99 % | HEART RATE: 72 BPM | SYSTOLIC BLOOD PRESSURE: 138 MMHG | DIASTOLIC BLOOD PRESSURE: 91 MMHG | TEMPERATURE: 97.5 F

## 2024-02-29 DIAGNOSIS — N18.9 ACUTE KIDNEY INJURY SUPERIMPOSED ON CHRONIC KIDNEY DISEASE (HCC): Primary | ICD-10-CM

## 2024-02-29 DIAGNOSIS — N17.9 ACUTE KIDNEY INJURY SUPERIMPOSED ON CHRONIC KIDNEY DISEASE (HCC): Primary | ICD-10-CM

## 2024-02-29 PROCEDURE — G8427 DOCREV CUR MEDS BY ELIG CLIN: HCPCS | Performed by: SURGERY

## 2024-02-29 PROCEDURE — G8417 CALC BMI ABV UP PARAM F/U: HCPCS | Performed by: SURGERY

## 2024-02-29 PROCEDURE — 4004F PT TOBACCO SCREEN RCVD TLK: CPT | Performed by: SURGERY

## 2024-02-29 PROCEDURE — 99213 OFFICE O/P EST LOW 20 MIN: CPT | Performed by: SURGERY

## 2024-02-29 PROCEDURE — 3017F COLORECTAL CA SCREEN DOC REV: CPT | Performed by: SURGERY

## 2024-02-29 PROCEDURE — G8484 FLU IMMUNIZE NO ADMIN: HCPCS | Performed by: SURGERY

## 2024-05-21 ENCOUNTER — TELEPHONE (OUTPATIENT)
Dept: CARDIOLOGY CLINIC | Age: 59
End: 2024-05-21

## 2024-05-21 NOTE — TELEPHONE ENCOUNTER
Date: 7/11/24     Cardiologist: Kevin     Procedure: EGD & Colonscopy     Surgeon: Aliya Jauregui     Last Office Visit: 12/14/23  Reason for office visit and medical concerns addressed at this office visit: cad, dm,      Testing Performed and Date of Service:  12/19/23 Echo  Moderately thickened mitral valve with mildly reduced leaflet mobility. No   evidence of mitral valve stenosis, trivial mitral regurgitation.   Mildly thickened aortic valve leaflets with preserved leaflet mobility.   No evidence of aortic stenosis; trivial aortic regurgitation.   Tricuspid valve is structurally normal.   Trivial tricuspid regurgitation with estimated RVSP of 29 mm Hg.   Left ventricular size is normal .   Mild concentric left ventricular hypertrophy.   Left ventricular ejection fraction is estimated at 49%.   Moderate to large area distal septal, anteroseptal, and anterior akinesis   with thinned out myocardium c/w prior myocardial infarction   Impaired relaxation compatible with diastolic dysfunction. ( reversed E/A   ratio)      Recommendations      Complete tobacco cessation. Medical management of coronary disease and LV   dysfunction. Plavix uninterrupted for 6 months.    Current Medications: coreg, aspirin, pepcid, atorvastatin, plavix, iron, singulair, lasix, insulin     Is the patient currently taking an anticoagulant? If so, what is the diagnosis the patient has been given to warrant the need for the anticoagulant? Plavix for RITIKA 8/10/23     Additional Notes: requesting cardiac clearance requesting to hold plavix 5 days

## 2024-05-21 NOTE — TELEPHONE ENCOUNTER
Tess Devine, APRN - NP  You5 minutes ago (1:48 PM)       Patient has appointment on June 13 we will evaluate at that appointment

## 2024-06-13 ENCOUNTER — OFFICE VISIT (OUTPATIENT)
Dept: CARDIOLOGY CLINIC | Age: 59
End: 2024-06-13
Payer: MEDICARE

## 2024-06-13 VITALS
HEIGHT: 70 IN | OXYGEN SATURATION: 99 % | HEART RATE: 83 BPM | SYSTOLIC BLOOD PRESSURE: 128 MMHG | BODY MASS INDEX: 32.21 KG/M2 | DIASTOLIC BLOOD PRESSURE: 82 MMHG | WEIGHT: 225 LBS

## 2024-06-13 DIAGNOSIS — I25.10 CORONARY ARTERY DISEASE INVOLVING NATIVE CORONARY ARTERY OF NATIVE HEART WITHOUT ANGINA PECTORIS: ICD-10-CM

## 2024-06-13 DIAGNOSIS — I10 ESSENTIAL HYPERTENSION: Primary | ICD-10-CM

## 2024-06-13 DIAGNOSIS — E78.5 DYSLIPIDEMIA: ICD-10-CM

## 2024-06-13 DIAGNOSIS — I50.22 CHRONIC SYSTOLIC HEART FAILURE (HCC): ICD-10-CM

## 2024-06-13 PROCEDURE — 4004F PT TOBACCO SCREEN RCVD TLK: CPT | Performed by: NURSE PRACTITIONER

## 2024-06-13 PROCEDURE — G8417 CALC BMI ABV UP PARAM F/U: HCPCS | Performed by: NURSE PRACTITIONER

## 2024-06-13 PROCEDURE — G8427 DOCREV CUR MEDS BY ELIG CLIN: HCPCS | Performed by: NURSE PRACTITIONER

## 2024-06-13 PROCEDURE — 3074F SYST BP LT 130 MM HG: CPT | Performed by: NURSE PRACTITIONER

## 2024-06-13 PROCEDURE — 3017F COLORECTAL CA SCREEN DOC REV: CPT | Performed by: NURSE PRACTITIONER

## 2024-06-13 PROCEDURE — 99214 OFFICE O/P EST MOD 30 MIN: CPT | Performed by: NURSE PRACTITIONER

## 2024-06-13 PROCEDURE — 3079F DIAST BP 80-89 MM HG: CPT | Performed by: NURSE PRACTITIONER

## 2024-06-13 RX ORDER — METOPROLOL TARTRATE 50 MG/1
50 TABLET, FILM COATED ORAL 2 TIMES DAILY
COMMUNITY
Start: 2021-05-21

## 2024-06-13 RX ORDER — AMLODIPINE BESYLATE 10 MG/1
10 TABLET ORAL DAILY
COMMUNITY
Start: 2020-10-30

## 2024-06-13 RX ORDER — SACUBITRIL AND VALSARTAN 24; 26 MG/1; MG/1
1 TABLET, FILM COATED ORAL 2 TIMES DAILY
COMMUNITY

## 2024-06-13 ASSESSMENT — ENCOUNTER SYMPTOMS
CHEST TIGHTNESS: 0
WHEEZING: 0
SORE THROAT: 0
COUGH: 0
SHORTNESS OF BREATH: 0

## 2024-06-13 NOTE — PROGRESS NOTES
[facility] Cardiology   Established Patient Office Visit  [address]  [address]  [facility] [address]  [phone]        OFFICE VISIT:  2024    Casimiro Vinson - : 1965    Reason For Visit:  Casimiro is a 59 y.o. male who is here for 6 Month Follow-Up    1. Essential hypertension    2. Dyslipidemia    3. Coronary artery disease involving native coronary artery of native heart without angina pectoris    4. Chronic systolic heart failure (HCC)        Patient with a history of hypertension, diabetes, dyslipidemia, and GERD.  He presented to the emergency department on 8/10/2023 as a transfer from [facility] due to CHIARA on chronic kidney disease.  Creatinine at outside facility was 5.8.  He had also had complaints of worsening dyspnea with exertion.  He was admitted to the hospitalist service and nephrology, vascular and cardiology consulted.  Patient underwent permacath placement and started hemodialysis on 2023.  2D echo showed an EF of 35% with multiple wall motion abnormalities.  Troponins were were elevated.  Patient underwent cardiac catheterization that showed mid LAD 90% stenosis with thrombus and dominant circumflex with mid 70% stenosis, status post PCI to mid LAD and circumflex with drug-eluting stent x2.  He was started on dual antiplatelet therapy with aspirin and Plavix uninterrupted for 6 months.  Patient was to continue the Lipitor and Entresto and Coreg.  They did discontinue his metoprolol and HCTZ.      Patient presents to clinic today for 6-month follow-up.  Still undergoing dialysis Monday, Wednesday and Friday.  Patient denies any chest pain, pressure or tightness.  There is no shortness of breath, orthopnea or PND.  Patient denies any lightheadedness, dizziness or syncope.     Subjective    Casimiro Vinson is a 59 y.o. male with the following history as recorded in [facility]:    Patient Active Problem List    Diagnosis Date Noted    Acute kidney injury superimposed on

## 2024-12-02 RX ORDER — CLOPIDOGREL BISULFATE 75 MG/1
75 TABLET ORAL DAILY
Qty: 90 TABLET | Refills: 3 | Status: SHIPPED | OUTPATIENT
Start: 2024-12-02

## 2024-12-16 ENCOUNTER — OFFICE VISIT (OUTPATIENT)
Dept: CARDIOLOGY CLINIC | Age: 59
End: 2024-12-16
Payer: MEDICARE

## 2024-12-16 VITALS
DIASTOLIC BLOOD PRESSURE: 72 MMHG | SYSTOLIC BLOOD PRESSURE: 90 MMHG | WEIGHT: 217 LBS | HEIGHT: 70 IN | HEART RATE: 54 BPM | BODY MASS INDEX: 31.07 KG/M2

## 2024-12-16 DIAGNOSIS — E78.5 DYSLIPIDEMIA: ICD-10-CM

## 2024-12-16 DIAGNOSIS — I50.22 CHRONIC SYSTOLIC HEART FAILURE (HCC): ICD-10-CM

## 2024-12-16 DIAGNOSIS — I10 ESSENTIAL HYPERTENSION: ICD-10-CM

## 2024-12-16 DIAGNOSIS — I25.10 CORONARY ARTERY DISEASE INVOLVING NATIVE CORONARY ARTERY OF NATIVE HEART WITHOUT ANGINA PECTORIS: Primary | ICD-10-CM

## 2024-12-16 PROCEDURE — G8417 CALC BMI ABV UP PARAM F/U: HCPCS | Performed by: NURSE PRACTITIONER

## 2024-12-16 PROCEDURE — 99214 OFFICE O/P EST MOD 30 MIN: CPT | Performed by: NURSE PRACTITIONER

## 2024-12-16 PROCEDURE — 3074F SYST BP LT 130 MM HG: CPT | Performed by: NURSE PRACTITIONER

## 2024-12-16 PROCEDURE — G8484 FLU IMMUNIZE NO ADMIN: HCPCS | Performed by: NURSE PRACTITIONER

## 2024-12-16 PROCEDURE — G8427 DOCREV CUR MEDS BY ELIG CLIN: HCPCS | Performed by: NURSE PRACTITIONER

## 2024-12-16 PROCEDURE — 4004F PT TOBACCO SCREEN RCVD TLK: CPT | Performed by: NURSE PRACTITIONER

## 2024-12-16 PROCEDURE — 3017F COLORECTAL CA SCREEN DOC REV: CPT | Performed by: NURSE PRACTITIONER

## 2024-12-16 PROCEDURE — 3078F DIAST BP <80 MM HG: CPT | Performed by: NURSE PRACTITIONER

## 2024-12-16 ASSESSMENT — ENCOUNTER SYMPTOMS
COUGH: 0
SORE THROAT: 0
SHORTNESS OF BREATH: 0
CHEST TIGHTNESS: 0
WHEEZING: 0

## 2024-12-16 NOTE — PROGRESS NOTES
[facility] Cardiology   Established Patient Office Visit  [address]  [address]  [facility] [address]  [phone]        OFFICE VISIT:  2024    Casimiro Vinson - : 1965    Reason For Visit:  Casimiro is a 59 y.o. male who is here for No chief complaint on file.    1. Coronary artery disease involving native coronary artery of native heart without angina pectoris    2. Chronic systolic heart failure (HCC)    3. Essential hypertension    4. Dyslipidemia        Patient with a history of hypertension, diabetes, dyslipidemia, and GERD.  He presented to the emergency department on 8/10/2023 as a transfer from [facility] due to CHIARA on chronic kidney disease.  Creatinine at outside facility was 5.8.  He had also had complaints of worsening dyspnea with exertion.  He was admitted to the hospitalist service and nephrology, vascular and cardiology consulted.  Patient underwent permacath placement and started hemodialysis on 2023.  2D echo showed an EF of 35% with multiple wall motion abnormalities.  Troponins were were elevated.  Patient underwent cardiac catheterization that showed mid LAD 90% stenosis with thrombus and dominant circumflex with mid 70% stenosis, status post PCI to mid LAD and circumflex with drug-eluting stent x2.  He was started on dual antiplatelet therapy with aspirin and Plavix uninterrupted for 6 months.  Patient was to continue the Lipitor and Entresto and Coreg.  They did discontinue his metoprolol and HCTZ.        Patient presents to clinic today for 6-month follow-up.  Still undergoing dialysis Monday, Wednesday and Friday.  Patient denies any chest pain, pressure or tightness.  There is no shortness of breath, orthopnea or PND.   Patient awaiting call for Kidney Transplant.       Subjective    Casimiro Vinson is a 59 y.o. male with the following history as recorded in [facility]:    Patient Active Problem List    Diagnosis Date Noted    Acute kidney injury superimposed

## 2025-01-27 ENCOUNTER — TELEPHONE (OUTPATIENT)
Dept: CARDIOLOGY CLINIC | Age: 60
End: 2025-01-27

## 2025-01-27 NOTE — TELEPHONE ENCOUNTER
Patient wife called to see if the office received a order from Patients  Mela at [facility] for a ECHO. She states Mela faxed over the order last week. Please return call.    If office needs to call Mela regarding order    Ph: [phone]

## 2025-02-05 ENCOUNTER — TRANSCRIBE ORDERS (OUTPATIENT)
Dept: ADMINISTRATIVE | Age: 60
End: 2025-02-05

## 2025-02-05 DIAGNOSIS — Z01.818 PRE-TRANSPLANT EVALUATION FOR CHRONIC KIDNEY DISEASE: ICD-10-CM

## 2025-02-05 DIAGNOSIS — Z01.810 PREOP CARDIOVASCULAR EXAM: ICD-10-CM

## 2025-02-05 DIAGNOSIS — N18.6 END STAGE RENAL DISEASE (HCC): Primary | ICD-10-CM

## 2025-04-11 ENCOUNTER — TRANSCRIBE ORDERS (OUTPATIENT)
Dept: ADMINISTRATIVE | Age: 60
End: 2025-04-11

## 2025-04-11 DIAGNOSIS — N18.5 CHRONIC KIDNEY DISEASE, STAGE V (HCC): ICD-10-CM

## 2025-04-11 DIAGNOSIS — Z01.810 PREOP CARDIOVASCULAR EXAM: ICD-10-CM

## 2025-04-11 DIAGNOSIS — Z01.818 ENCOUNTER FOR PRE-TRANSPLANT EVALUATION FOR KIDNEY AND PANCREAS TRANSPLANT: Primary | ICD-10-CM

## 2025-05-03 ENCOUNTER — APPOINTMENT (OUTPATIENT)
Dept: GENERAL RADIOLOGY | Age: 60
DRG: 001 | End: 2025-05-03
Payer: MEDICARE

## 2025-05-03 ENCOUNTER — HOSPITAL ENCOUNTER (INPATIENT)
Age: 60
LOS: 5 days | Discharge: HOSPICE | DRG: 001 | End: 2025-05-08
Attending: EMERGENCY MEDICINE | Admitting: HOSPITALIST
Payer: MEDICARE

## 2025-05-03 DIAGNOSIS — Z99.2 ESRD NEEDING DIALYSIS (HCC): ICD-10-CM

## 2025-05-03 DIAGNOSIS — Z99.2 ESRD ON DIALYSIS (HCC): ICD-10-CM

## 2025-05-03 DIAGNOSIS — R06.02 SHORTNESS OF BREATH: Primary | ICD-10-CM

## 2025-05-03 DIAGNOSIS — R79.89 ELEVATED TROPONIN: ICD-10-CM

## 2025-05-03 DIAGNOSIS — N18.6 ESRD NEEDING DIALYSIS (HCC): ICD-10-CM

## 2025-05-03 DIAGNOSIS — N18.6 ESRD ON DIALYSIS (HCC): ICD-10-CM

## 2025-05-03 LAB
ALBUMIN SERPL-MCNC: 3.7 G/DL (ref 3.5–5.2)
ALP SERPL-CCNC: 96 U/L (ref 40–129)
ALT SERPL-CCNC: 9 U/L (ref 10–50)
ANION GAP SERPL CALCULATED.3IONS-SCNC: 19 MMOL/L (ref 8–16)
AST SERPL-CCNC: 25 U/L (ref 10–50)
BASOPHILS # BLD: 0.1 K/UL (ref 0–0.2)
BASOPHILS NFR BLD: 0.8 % (ref 0–1)
BILIRUB SERPL-MCNC: 0.4 MG/DL (ref 0.2–1.2)
BNP BLD-MCNC: ABNORMAL PG/ML (ref 0–124)
BUN SERPL-MCNC: 41 MG/DL (ref 8–23)
CALCIUM SERPL-MCNC: 9.1 MG/DL (ref 8.8–10.2)
CHLORIDE SERPL-SCNC: 86 MMOL/L (ref 98–107)
CO2 SERPL-SCNC: 24 MMOL/L (ref 22–29)
CREAT SERPL-MCNC: 10.8 MG/DL (ref 0.7–1.2)
EKG P AXIS: 60 DEGREES
EKG P-R INTERVAL: 152 MS
EKG Q-T INTERVAL: 404 MS
EKG QRS DURATION: 82 MS
EKG QTC CALCULATION (BAZETT): 444 MS
EKG T AXIS: 121 DEGREES
EOSINOPHIL # BLD: 0.3 K/UL (ref 0–0.6)
EOSINOPHIL NFR BLD: 2.8 % (ref 0–5)
ERYTHROCYTE [DISTWIDTH] IN BLOOD BY AUTOMATED COUNT: 13 % (ref 11.5–14.5)
FERRITIN SERPL-MCNC: ABNORMAL NG/ML (ref 30–400)
GLUCOSE BLD-MCNC: 193 MG/DL (ref 70–99)
GLUCOSE SERPL-MCNC: 245 MG/DL (ref 70–99)
HBA1C MFR BLD: 6.3 % (ref 4–5.6)
HCT VFR BLD AUTO: 22.7 % (ref 42–52)
HGB BLD-MCNC: 7.5 G/DL (ref 14–18)
IMM GRANULOCYTES # BLD: 0.1 K/UL
IRON SATN MFR SERPL: 35 % (ref 20–50)
IRON SERPL-MCNC: 67 UG/DL (ref 59–158)
LYMPHOCYTES # BLD: 1.5 K/UL (ref 1.1–4.5)
LYMPHOCYTES NFR BLD: 12.1 % (ref 20–40)
MCH RBC QN AUTO: 31.6 PG (ref 27–31)
MCHC RBC AUTO-ENTMCNC: 33 G/DL (ref 33–37)
MCV RBC AUTO: 95.8 FL (ref 80–94)
MONOCYTES # BLD: 1.1 K/UL (ref 0–0.9)
MONOCYTES NFR BLD: 8.8 % (ref 0–10)
NEUTROPHILS # BLD: 9.1 K/UL (ref 1.5–7.5)
NEUTS SEG NFR BLD: 75 % (ref 50–65)
PERFORMED ON: ABNORMAL
PLATELET # BLD AUTO: 394 K/UL (ref 130–400)
PMV BLD AUTO: 10.3 FL (ref 9.4–12.4)
POTASSIUM SERPL-SCNC: 4.6 MMOL/L (ref 3.5–5.1)
PROT SERPL-MCNC: 7.2 G/DL (ref 6.4–8.3)
RBC # BLD AUTO: 2.37 M/UL (ref 4.7–6.1)
SODIUM SERPL-SCNC: 129 MMOL/L (ref 136–145)
TIBC SERPL-MCNC: 192 UG/DL (ref 250–400)
TROPONIN, HIGH SENSITIVITY: 117 NG/L (ref 0–22)
TROPONIN, HIGH SENSITIVITY: 120 NG/L (ref 0–22)
WBC # BLD AUTO: 12.1 K/UL (ref 4.8–10.8)

## 2025-05-03 PROCEDURE — 93010 ELECTROCARDIOGRAM REPORT: CPT | Performed by: INTERNAL MEDICINE

## 2025-05-03 PROCEDURE — 71045 X-RAY EXAM CHEST 1 VIEW: CPT

## 2025-05-03 PROCEDURE — 6360000002 HC RX W HCPCS: Performed by: NURSE PRACTITIONER

## 2025-05-03 PROCEDURE — 94150 VITAL CAPACITY TEST: CPT

## 2025-05-03 PROCEDURE — P9016 RBC LEUKOCYTES REDUCED: HCPCS

## 2025-05-03 PROCEDURE — 6370000000 HC RX 637 (ALT 250 FOR IP): Performed by: EMERGENCY MEDICINE

## 2025-05-03 PROCEDURE — 82962 GLUCOSE BLOOD TEST: CPT

## 2025-05-03 PROCEDURE — 83550 IRON BINDING TEST: CPT

## 2025-05-03 PROCEDURE — P9073 PLATELETS PHERESIS PATH REDU: HCPCS

## 2025-05-03 PROCEDURE — 8010000000 HC HEMODIALYSIS ACUTE INPT

## 2025-05-03 PROCEDURE — 85025 COMPLETE CBC W/AUTO DIFF WBC: CPT

## 2025-05-03 PROCEDURE — 82728 ASSAY OF FERRITIN: CPT

## 2025-05-03 PROCEDURE — 84484 ASSAY OF TROPONIN QUANT: CPT

## 2025-05-03 PROCEDURE — 6360000002 HC RX W HCPCS: Performed by: INTERNAL MEDICINE

## 2025-05-03 PROCEDURE — 1200000000 HC SEMI PRIVATE

## 2025-05-03 PROCEDURE — 2500000003 HC RX 250 WO HCPCS: Performed by: NURSE PRACTITIONER

## 2025-05-03 PROCEDURE — 94760 N-INVAS EAR/PLS OXIMETRY 1: CPT

## 2025-05-03 PROCEDURE — 83880 ASSAY OF NATRIURETIC PEPTIDE: CPT

## 2025-05-03 PROCEDURE — 93005 ELECTROCARDIOGRAM TRACING: CPT | Performed by: EMERGENCY MEDICINE

## 2025-05-03 PROCEDURE — 83540 ASSAY OF IRON: CPT

## 2025-05-03 PROCEDURE — 36556 INSERT NON-TUNNEL CV CATH: CPT

## 2025-05-03 PROCEDURE — 6370000000 HC RX 637 (ALT 250 FOR IP): Performed by: NURSE PRACTITIONER

## 2025-05-03 PROCEDURE — 83036 HEMOGLOBIN GLYCOSYLATED A1C: CPT

## 2025-05-03 PROCEDURE — 36415 COLL VENOUS BLD VENIPUNCTURE: CPT

## 2025-05-03 PROCEDURE — 80053 COMPREHEN METABOLIC PANEL: CPT

## 2025-05-03 PROCEDURE — 99285 EMERGENCY DEPT VISIT HI MDM: CPT

## 2025-05-03 RX ORDER — OXYCODONE HYDROCHLORIDE 5 MG/1
5 CAPSULE ORAL EVERY 6 HOURS PRN
COMMUNITY

## 2025-05-03 RX ORDER — SODIUM CHLORIDE 0.9 % (FLUSH) 0.9 %
5-40 SYRINGE (ML) INJECTION PRN
Status: DISCONTINUED | OUTPATIENT
Start: 2025-05-03 | End: 2025-05-08 | Stop reason: HOSPADM

## 2025-05-03 RX ORDER — INSULIN LISPRO 100 [IU]/ML
0-4 INJECTION, SOLUTION INTRAVENOUS; SUBCUTANEOUS
Status: DISCONTINUED | OUTPATIENT
Start: 2025-05-03 | End: 2025-05-08 | Stop reason: HOSPADM

## 2025-05-03 RX ORDER — SODIUM CHLORIDE 9 MG/ML
INJECTION, SOLUTION INTRAVENOUS PRN
Status: DISCONTINUED | OUTPATIENT
Start: 2025-05-03 | End: 2025-05-04 | Stop reason: SDUPTHER

## 2025-05-03 RX ORDER — SEVELAMER CARBONATE 800 MG/1
2 TABLET, FILM COATED ORAL
COMMUNITY

## 2025-05-03 RX ORDER — ACETAMINOPHEN 325 MG/1
650 TABLET ORAL EVERY 6 HOURS PRN
Status: DISCONTINUED | OUTPATIENT
Start: 2025-05-03 | End: 2025-05-08 | Stop reason: HOSPADM

## 2025-05-03 RX ORDER — DEXTROSE MONOHYDRATE 100 MG/ML
INJECTION, SOLUTION INTRAVENOUS CONTINUOUS PRN
Status: DISCONTINUED | OUTPATIENT
Start: 2025-05-03 | End: 2025-05-04 | Stop reason: SDUPTHER

## 2025-05-03 RX ORDER — ATORVASTATIN CALCIUM 40 MG/1
40 TABLET, FILM COATED ORAL NIGHTLY
Status: DISCONTINUED | OUTPATIENT
Start: 2025-05-03 | End: 2025-05-08 | Stop reason: HOSPADM

## 2025-05-03 RX ORDER — ONDANSETRON 2 MG/ML
4 INJECTION INTRAMUSCULAR; INTRAVENOUS EVERY 6 HOURS PRN
Status: DISCONTINUED | OUTPATIENT
Start: 2025-05-03 | End: 2025-05-04 | Stop reason: SDUPTHER

## 2025-05-03 RX ORDER — SODIUM CHLORIDE 0.9 % (FLUSH) 0.9 %
5-40 SYRINGE (ML) INJECTION EVERY 12 HOURS SCHEDULED
Status: DISCONTINUED | OUTPATIENT
Start: 2025-05-03 | End: 2025-05-04

## 2025-05-03 RX ORDER — ONDANSETRON 4 MG/1
4 TABLET, ORALLY DISINTEGRATING ORAL EVERY 8 HOURS PRN
Status: DISCONTINUED | OUTPATIENT
Start: 2025-05-03 | End: 2025-05-05 | Stop reason: SDUPTHER

## 2025-05-03 RX ORDER — FUROSEMIDE 40 MG/1
40 TABLET ORAL DAILY
Status: DISCONTINUED | OUTPATIENT
Start: 2025-05-03 | End: 2025-05-08 | Stop reason: HOSPADM

## 2025-05-03 RX ORDER — ASPIRIN 81 MG/1
81 TABLET, CHEWABLE ORAL DAILY
Status: DISCONTINUED | OUTPATIENT
Start: 2025-05-04 | End: 2025-05-08 | Stop reason: HOSPADM

## 2025-05-03 RX ORDER — ASPIRIN 325 MG
325 TABLET ORAL ONCE
Status: COMPLETED | OUTPATIENT
Start: 2025-05-03 | End: 2025-05-03

## 2025-05-03 RX ORDER — FAMOTIDINE 20 MG/1
20 TABLET, FILM COATED ORAL DAILY PRN
Status: DISCONTINUED | OUTPATIENT
Start: 2025-05-03 | End: 2025-05-06

## 2025-05-03 RX ORDER — CETIRIZINE HYDROCHLORIDE 10 MG/1
10 TABLET ORAL DAILY
COMMUNITY

## 2025-05-03 RX ORDER — HEPARIN SODIUM 5000 [USP'U]/ML
5000 INJECTION, SOLUTION INTRAVENOUS; SUBCUTANEOUS EVERY 8 HOURS SCHEDULED
Status: DISCONTINUED | OUTPATIENT
Start: 2025-05-03 | End: 2025-05-04

## 2025-05-03 RX ORDER — CLOPIDOGREL BISULFATE 75 MG/1
75 TABLET ORAL DAILY
Status: DISCONTINUED | OUTPATIENT
Start: 2025-05-03 | End: 2025-05-04

## 2025-05-03 RX ORDER — FERROUS SULFATE 325(65) MG
650 TABLET ORAL
Status: DISCONTINUED | OUTPATIENT
Start: 2025-05-04 | End: 2025-05-08 | Stop reason: HOSPADM

## 2025-05-03 RX ORDER — CARVEDILOL 6.25 MG/1
6.25 TABLET ORAL 2 TIMES DAILY
Status: DISCONTINUED | OUTPATIENT
Start: 2025-05-03 | End: 2025-05-04

## 2025-05-03 RX ORDER — GLUCAGON 1 MG/ML
1 KIT INJECTION PRN
Status: DISCONTINUED | OUTPATIENT
Start: 2025-05-03 | End: 2025-05-04 | Stop reason: SDUPTHER

## 2025-05-03 RX ORDER — INSULIN GLARGINE 100 [IU]/ML
15 INJECTION, SOLUTION SUBCUTANEOUS NIGHTLY
Status: DISCONTINUED | OUTPATIENT
Start: 2025-05-03 | End: 2025-05-04 | Stop reason: SDUPTHER

## 2025-05-03 RX ORDER — METOPROLOL TARTRATE 50 MG
50 TABLET ORAL 2 TIMES DAILY
Status: DISCONTINUED | OUTPATIENT
Start: 2025-05-03 | End: 2025-05-03

## 2025-05-03 RX ORDER — POLYETHYLENE GLYCOL 3350 17 G/17G
17 POWDER, FOR SOLUTION ORAL DAILY PRN
Status: DISCONTINUED | OUTPATIENT
Start: 2025-05-03 | End: 2025-05-08 | Stop reason: HOSPADM

## 2025-05-03 RX ORDER — ACETAMINOPHEN 650 MG/1
650 SUPPOSITORY RECTAL EVERY 6 HOURS PRN
Status: DISCONTINUED | OUTPATIENT
Start: 2025-05-03 | End: 2025-05-08 | Stop reason: HOSPADM

## 2025-05-03 RX ORDER — MONTELUKAST SODIUM 10 MG/1
10 TABLET ORAL DAILY
Status: DISCONTINUED | OUTPATIENT
Start: 2025-05-03 | End: 2025-05-08 | Stop reason: HOSPADM

## 2025-05-03 RX ORDER — AMLODIPINE BESYLATE 5 MG/1
10 TABLET ORAL DAILY
Status: DISCONTINUED | OUTPATIENT
Start: 2025-05-03 | End: 2025-05-04

## 2025-05-03 RX ADMIN — INSULIN LISPRO 1 UNITS: 100 INJECTION, SOLUTION INTRAVENOUS; SUBCUTANEOUS at 18:41

## 2025-05-03 RX ADMIN — SACUBITRIL AND VALSARTAN 1 TABLET: 24; 26 TABLET, FILM COATED ORAL at 19:50

## 2025-05-03 RX ADMIN — AMLODIPINE BESYLATE 10 MG: 10 TABLET ORAL at 15:29

## 2025-05-03 RX ADMIN — EPOETIN ALFA-EPBX 10000 UNITS: 10000 INJECTION, SOLUTION INTRAVENOUS; SUBCUTANEOUS at 18:42

## 2025-05-03 RX ADMIN — HEPARIN SODIUM 3600 UNITS: 1000 INJECTION INTRAVENOUS; SUBCUTANEOUS at 13:02

## 2025-05-03 RX ADMIN — SODIUM CHLORIDE, PRESERVATIVE FREE 10 ML: 5 INJECTION INTRAVENOUS at 15:30

## 2025-05-03 RX ADMIN — INSULIN GLARGINE 15 UNITS: 100 INJECTION, SOLUTION SUBCUTANEOUS at 19:50

## 2025-05-03 RX ADMIN — CARVEDILOL 6.25 MG: 6.25 TABLET, FILM COATED ORAL at 19:50

## 2025-05-03 RX ADMIN — FUROSEMIDE 40 MG: 40 TABLET ORAL at 15:29

## 2025-05-03 RX ADMIN — ATORVASTATIN CALCIUM 40 MG: 40 TABLET, FILM COATED ORAL at 19:50

## 2025-05-03 RX ADMIN — SODIUM CHLORIDE, PRESERVATIVE FREE 10 ML: 5 INJECTION INTRAVENOUS at 20:39

## 2025-05-03 RX ADMIN — ASPIRIN 325 MG: 325 TABLET ORAL at 09:06

## 2025-05-03 RX ADMIN — INSULIN LISPRO 1 UNITS: 100 INJECTION, SOLUTION INTRAVENOUS; SUBCUTANEOUS at 19:50

## 2025-05-03 RX ADMIN — CLOPIDOGREL BISULFATE 75 MG: 75 TABLET, FILM COATED ORAL at 15:29

## 2025-05-03 RX ADMIN — HEPARIN SODIUM 5000 UNITS: 5000 INJECTION INTRAVENOUS; SUBCUTANEOUS at 15:29

## 2025-05-03 RX ADMIN — HEPARIN SODIUM 5000 UNITS: 5000 INJECTION INTRAVENOUS; SUBCUTANEOUS at 20:40

## 2025-05-03 RX ADMIN — MONTELUKAST 10 MG: 10 TABLET, FILM COATED ORAL at 15:29

## 2025-05-03 ASSESSMENT — PAIN SCALES - GENERAL
PAINLEVEL_OUTOF10: 0

## 2025-05-03 ASSESSMENT — PAIN - FUNCTIONAL ASSESSMENT: PAIN_FUNCTIONAL_ASSESSMENT: 0-10

## 2025-05-03 NOTE — H&P
[facility]      Hospitalist - History & Physical      PCP: Abdi Lozano DO    Date of Admission: 5/3/2025    Date of Service: 5/3/2025    Chief Complaint:  shortness of breath and lightheadedness     History Of Present Illness:   The patient is a 60 y.o. male with past medical history of CAD s/p recent single vessel bypass at [place] on 4/17/25, ESRD on HD MWF, Type 2 diabetes, hypertension and hyperlipidemia who presented to [facility] ED for evaluation of shortness of breath and lightheadedness. Reported having missed his dialysis yesterday due to weakness and had it rescheduled for today. Last dialysis was Wednesday this week. However this morning, he had noted some shortness of breath while in shower and felt lightheaded but did not loose consciousness. Reported nonproductive to productive cough at times. Denied fever, chills, nausea, or vomiting. Workup in ED revealed Na 129, BUN 41, Cr 10.8, Troponin 120 followed by 117, BNP 11,113, WBC 12.1, Hgb 7.5, Cxray indicated small left pleural effusion with left mid and lower lung zone atelectasis and/or consolidation, cardiomegaly. Patient was admitted to hospital medicine for further evaluation with nephrology and CT surgery consultation.       Past Medical History:    History reviewed. No pertinent past medical history.    Past Surgical History:        Procedure Laterality Date    VASCULAR SURGERY  08/11/2023    Ultrasound guided cannulation of right internal jugular vein AND  Placement of right internal jugular vein tunneled dialysis catheter (Bard Equistream XK 23 cm tip to cuff). VI       Home Medications:  Prior to Admission medications    Medication Sig Start Date End Date Taking? Authorizing Provider   clopidogrel (PLAVIX) 75 MG tablet TAKE 1 TABLET BY MOUTH EVERY DAY 12/2/24   Paula Sherman APRN   amLODIPine (NORVASC) 10 MG tablet Take 1 tablet by mouth daily 10/30/20   Provider, MD Shubham   metoprolol tartrate (LOPRESSOR) 50 MG

## 2025-05-03 NOTE — ED NOTES
Called [facility] for Dr. Rodgers. Left message with answering service. Timothy will return call.

## 2025-05-03 NOTE — ED NOTES
ED TO INPATIENT SBAR HANDOFF    Patient Name: Casimiro Vinson   : 1965  60 y.o.   Family/Caregiver Present: Yes  Code Status Order: Prior    C-SSRS: Risk of Suicide: No Risk  Sitter No  Restraints:         Situation  Chief Complaint:   Chief Complaint   Patient presents with    Post-op Problem     Recent surgery at [place] 25, dyspnea on exertion, weakness, pain to LT side surgical site with movement, nausea. Dialysis pt MWF-last session Wednesday     Patient Diagnosis: ESRD needing dialysis (HCC) [N18.6, Z99.2]     Brief Description of Patient's Condition: patient in dialysis and will be coming to the 3rd floor after. Patient presented to ED with chest pain, SOA, and dizzy. He recently had cardiac bypass in [place]     Mental Status: alert, coherent, logical, thought processes intact, and able to concentrate and follow conversation  Arrived from: home    Imaging:   XR CHEST PORTABLE   Final Result   Small left pleural effusion with left mid and lower lung zone atelectasis and/or consolidation.       Cardiomegaly.               ______________________________________    Electronically signed by: NANCY SIDDIQI M.D.   Date:     2025   Time:    07:09         COVID-19 Results:   Internal Administration   First Dose      Second Dose           Last COVID Lab No results found for: \"SARS-COV-2\"        Abnormal labs:   Abnormal Labs Reviewed   BRAIN NATRIURETIC PEPTIDE - Abnormal; Notable for the following components:       Result Value    NT Pro-BNP 11,113 (*)     All other components within normal limits   CBC WITH AUTO DIFFERENTIAL - Abnormal; Notable for the following components:    WBC 12.1 (*)     RBC 2.37 (*)     Hemoglobin 7.5 (*)     Hematocrit 22.7 (*)     MCV 95.8 (*)     MCH 31.6 (*)     Neutrophils % 75.0 (*)     Lymphocytes % 12.1 (*)     Neutrophils Absolute 9.1 (*)     Monocytes Absolute 1.10 (*)     All other components within normal limits   COMPREHENSIVE METABOLIC PANEL - Abnormal;  Rhythm:   NIH Score: NIH     Active LDA's:   Peripheral IV 05/03/25 Distal;Right Cephalic (Active)   Site Assessment Clean, dry & intact 05/03/25 0638   Line Status Blood return noted 05/03/25 0638   Phlebitis Assessment No symptoms 05/03/25 0638   Infiltration Assessment 0 05/03/25 0638   Dressing Status New dressing applied 05/03/25 0638     Pertinent or High Risk Medications/Drips: no   If Yes, please provide details:   Blood Product Administration: no  If Yes, please provide details:   Sepsis Risk Score      Admitted with Sepsis? No    Recommendation  Incomplete orders:   Patient Belongings:   Additional Comments:   If any further questions, please call Sending RN at [phone]    Electronically signed by: Electronically signed by Flaca Zafar RN on 5/3/2025 at 11:04 AM

## 2025-05-03 NOTE — ED PROVIDER NOTES
[facility] EMERGENCY DEPARTMENT  eMERGENCY dEPARTMENT eNCOUnter      Pt Name: Casimiro Vinson  MRN: [id number]  Birthdate 1965  Date of evaluation: 5/3/2025  Provider: MARCO ANTOINE MD    CHIEF COMPLAINT       Chief Complaint   Patient presents with    Post-op Problem     Recent surgery at [place] 4/17/25, dyspnea on exertion, weakness, pain to LT side surgical site with movement, nausea. Dialysis pt MWF-last session Wednesday         HISTORY OF PRESENT ILLNESS   (Location/Symptom, Timing/Onset,Context/Setting, Quality, Duration, Modifying Factors, Severity)  Note limiting factors.   Casimiro Vinson is a 60 y.o. male who presents to the emergency department for ongoing chest tightness, shortness of breath and lightheadedness.  Patient underwent single vessel cardiac bypass April 17 at [facility] has a prior history of 2 cardiac stents placed by Dr. Brown several years ago he tells me.  He has had ongoing chest tightness since his operation and since yesterday has noticed some increased shortness of breath and today while in shower felt lightheaded but did not pass out.  Missed dialysis yesterday due to not feeling well and just fatigued so postponed to have dialysis today at 0530 but when in shower due to feeling lightheaded and unwell decided to come to ER.  Last dialysis was Wednesday this week.  Has been on dialysis approximately 2 years.    HPI    NursingNotes were reviewed.    REVIEW OF SYSTEMS    (2-9 systems for level 4, 10 or more for level 5)     Review of Systems   Constitutional:  Negative for chills and fever.   Respiratory:  Positive for chest tightness and shortness of breath. Negative for cough.    Cardiovascular:  Negative for chest pain and leg swelling.   Gastrointestinal:  Negative for abdominal pain, diarrhea, nausea and vomiting.   Musculoskeletal:  Negative for back pain and neck pain.   Neurological:  Positive for light-headedness. Negative for syncope and headaches.          PAST

## 2025-05-03 NOTE — FLOWSHEET NOTE
[facility] INPATIENT SERVICES  DIALYSIS TREATMENT SUMMARY      Note: Consult with the attending physician for patient treatment orders, this document is not a physician order.      Patient Information   Patient Casimiro Vinson   Date of Birth February 03, 1965   Chart Number [id number]   Location [facility]   Location MRN MRN: [id number]   Gender Male   SSN (last 4)      Treatment Information   Treatment Type Hemodialysis   Treatment Id [id number]   Start Time May 03, 2025 09:22   End Time May 03, 2025 13:03   Acutal Duration 03:41     Treatment Balances   Total Saline Administered 1000   Net Fluid Balance -2000   Reason Goal Not Met System clotted - MD Aware    Hemodialysis Orders   Therapy Standard   Orders Verified Time 05/03/2025 09:00    Date Verified 05/03/2025   Duration 3:30   Isolated UF/Bypass No   BFR (mL) 450   DFR (mL) 700   Dialyzer Type OPTIFLUX 160NR   UF Order UF Goal Ordered   UF Goal Ordered (mL) 3000   With BP Parameters Yes   Crit-Line used No   Heparin Initial Units Bolus No   Heparin IV Maintenance Bolus No   Heparin IV Infusion No   Potassium (mEq/L) 2.0   Calcium (mEq/L) 2.5   Bicarb (mg/dL) 35   Sodium (mEq/L) 138   Clinician Roya Mo, SAIMA    Dialysis Access   Access Type Central Venous Catheter   Central Venous Catheter   Access Type Catheter - Tunneled   Access Location Chest Wall - R   1st Use Catheter Verified by Previous Use   Catheter Care Completed per Policy Yes   Dressing Dry and Intact on Arrival Yes   Dressing Changed Yes   Type of Dressing Film Biopatch/CHG   Dressing Changed By [facility] Staff   Type of HD Caps Curos   HD Caps Changed Yes   CVC Line Education Provided Yes - Infection Prevention      Vitals   Pre-Treatment Vitals   Time Is BP being recorded? Pre BP Map BP Method Pulse RR Temp How was Weight Obtained? Pre Weight Previous Dry Weight Previous Post Weight Metric Target Fluid Removal (mL) Dialysate Confirmed Clinician    05/03/2025 09:21 BP/Map

## 2025-05-03 NOTE — CONSULTS
Nephrology ([facility]) Consult Note      Patient:  Casimiro Vinson  YOB: 1965  Date of Service: 5/3/2025  MRN: [id number]   Acct: [id number]   Primary Care Physician: Abdi Lozano DO  Advance Directive: Prior  Admit Date: 5/3/2025       Hospital Day: 0  Referring Provider: Shannon Nichole MD    Patient independently seen and examined, Chart, Consults, Notes, Operative notes, Labs, Cardiology, and Radiology studies reviewed as available.    Chief complaint: Increasing shortness of breath.    Subjective:  Casimiro Vinson is a 60 y.o. male for whom we were consulted for evaluation and treatment of end-stage renal disease on maintenance dialysis Monday Wednesday Friday.  Last hemodialysis treatment was on Wednesday.  He was not feeling good to go for dialysis on Friday..  He was supposed to get dialysis today as he was getting ready to take a shower but has severe episode of increasing shortness of breath.  He was decided to come to the emergency room as his son drove him to ER at [facility].  Patient was diagnosed with pulmonary edema at that point he was also complaining of chest pain.  He was recently admitted to [facility], underwent single-vessel bypass surgery and had 2 cardiac stents in the past.  Patient was also complaining of lightheadedness.  His chest x-ray consistent with pulmonary edema.    Currently seen on hemodialysis  Hemodialysis access: Permacath  Hemodialysis: 3-1/2-hour  Ultrafiltration: 3000 cc  2K bath  Blood flow rate is 450 cc/min    Allergies:  Levaquin [levofloxacin]    Medicines:  Current Facility-Administered Medications   Medication Dose Route Frequency Provider Last Rate Last Admin    heparin (porcine) 1000 UNIT/ML injection 3,600 Units  3,600 Units IntraCATHeter Once in dialysis Juan R March MD         Current Outpatient Medications   Medication Sig Dispense Refill    clopidogrel (PLAVIX) 75 MG tablet TAKE 1

## 2025-05-04 ENCOUNTER — APPOINTMENT (OUTPATIENT)
Age: 60
DRG: 001 | End: 2025-05-04
Attending: SURGERY
Payer: MEDICARE

## 2025-05-04 ENCOUNTER — APPOINTMENT (OUTPATIENT)
Dept: GENERAL RADIOLOGY | Age: 60
DRG: 001 | End: 2025-05-04
Payer: MEDICARE

## 2025-05-04 ENCOUNTER — ANESTHESIA (OUTPATIENT)
Dept: OPERATING ROOM | Age: 60
End: 2025-05-04
Payer: MEDICARE

## 2025-05-04 ENCOUNTER — ANESTHESIA EVENT (OUTPATIENT)
Dept: OPERATING ROOM | Age: 60
End: 2025-05-04
Payer: MEDICARE

## 2025-05-04 PROBLEM — R06.02 SHORTNESS OF BREATH: Status: ACTIVE | Noted: 2023-08-10

## 2025-05-04 LAB
ABO + RH BLD: NORMAL
ALBUMIN SERPL-MCNC: 3.2 G/DL (ref 3.5–5.2)
ALBUMIN SERPL-MCNC: 3.4 G/DL (ref 3.5–5.2)
ALLENS TEST: ABNORMAL
ALLENS TEST: ABNORMAL
ALP SERPL-CCNC: 106 U/L (ref 40–129)
ALP SERPL-CCNC: 107 U/L (ref 40–129)
ALT SERPL-CCNC: 1413 U/L (ref 10–50)
ALT SERPL-CCNC: 2003 U/L (ref 10–50)
ANION GAP BLD CALC-SCNC: 23 MMOL/L
ANION GAP BLD CALC-SCNC: 23 MMOL/L
ANION GAP BLD CALC-SCNC: 26 MMOL/L
ANION GAP BLD CALC-SCNC: 28 MMOL/L
ANION GAP SERPL CALC-SCNC: 101 MEQ/L (ref 99–110)
ANION GAP SERPL CALC-SCNC: 102 MEQ/L (ref 99–110)
ANION GAP SERPL CALC-SCNC: 103 MEQ/L (ref 99–110)
ANION GAP SERPL CALC-SCNC: 103 MEQ/L (ref 99–110)
ANION GAP SERPL CALCULATED.3IONS-SCNC: 27 MMOL/L (ref 8–16)
ANION GAP SERPL CALCULATED.3IONS-SCNC: 29 MMOL/L (ref 8–16)
ANION GAP SERPL CALCULATED.3IONS-SCNC: 35 MMOL/L (ref 8–16)
ANION GAP SERPL CALCULATED.3IONS-SCNC: 35 MMOL/L (ref 8–16)
APTT PPP: 54.9 SEC (ref 26–36.2)
AST SERPL-CCNC: 2735 U/L (ref 10–50)
AST SERPL-CCNC: 4132 U/L (ref 10–50)
BASE EXCESS ARTERIAL: -11.1 MMOL/L (ref -2–2)
BASE EXCESS ARTERIAL: -11.8 MMOL/L (ref -2–2)
BASE EXCESS ARTERIAL: -12 (ref -3–3)
BASE EXCESS ARTERIAL: -12.9 MMOL/L (ref -2–2)
BASE EXCESS ARTERIAL: -15 (ref -3–3)
BASE EXCESS ARTERIAL: -21 (ref -3–3)
BASE EXCESS ARTERIAL: -8.2 MMOL/L (ref -2–2)
BASE EXCESS BLDV CALC-SCNC: -15 MMOL/L
BASE EXCESS VENOUS: -10 MMOL/L
BASOPHILS # BLD: 0 K/UL (ref 0–0.2)
BASOPHILS # BLD: 0.3 K/UL (ref 0–0.2)
BASOPHILS NFR BLD: 0.2 % (ref 0–1)
BASOPHILS NFR BLD: 1 % (ref 0–1)
BILIRUB SERPL-MCNC: 0.7 MG/DL (ref 0.2–1.2)
BILIRUB SERPL-MCNC: 0.9 MG/DL (ref 0.2–1.2)
BLD GP AB SCN SERPL QL: NORMAL
BLD GP AB SCN SERPL QL: NORMAL
BLOOD BANK DISPENSE STATUS: NORMAL
BLOOD BANK PRODUCT CODE: NORMAL
BPU ID: NORMAL
BUN SERPL-MCNC: 29 MG/DL (ref 8–23)
BUN SERPL-MCNC: 30 MG/DL (ref 8–23)
BUN SERPL-MCNC: 31 MG/DL (ref 8–23)
BUN SERPL-MCNC: 32 MG/DL (ref 8–23)
CA-I BLD-SCNC: 0.98 MMOL/L (ref 1.1–1.3)
CA-I BLD-SCNC: 1.05 MMOL/L (ref 1.1–1.3)
CA-I BLD-SCNC: 1.05 MMOL/L (ref 1.1–1.3)
CA-I BLD-SCNC: 1.07 MMOL/L (ref 1.1–1.3)
CALCIUM SERPL-MCNC: 7.8 MG/DL (ref 8.8–10.2)
CALCIUM SERPL-MCNC: 8.5 MG/DL (ref 8.8–10.2)
CALCIUM SERPL-MCNC: 8.6 MG/DL (ref 8.8–10.2)
CALCIUM SERPL-MCNC: 8.9 MG/DL (ref 8.8–10.2)
CARBOXYHEMOGLOBIN ARTERIAL: 1.5 % (ref 0–5)
CARBOXYHEMOGLOBIN ARTERIAL: 1.9 % (ref 0–5)
CARBOXYHEMOGLOBIN ARTERIAL: 2 % (ref 0–5)
CARBOXYHEMOGLOBIN ARTERIAL: 2.2 % (ref 0–5)
CARBOXYHEMOGLOBIN: 2.8 %
CHLORIDE SERPL-SCNC: 100 MMOL/L (ref 98–107)
CHLORIDE SERPL-SCNC: 89 MMOL/L (ref 98–107)
CHLORIDE SERPL-SCNC: 91 MMOL/L (ref 98–107)
CHLORIDE SERPL-SCNC: 96 MMOL/L (ref 98–107)
CO2 BLD CALC-SCNC: 10 MEQ/L (ref 21–32)
CO2 BLD CALC-SCNC: 15 MEQ/L (ref 21–32)
CO2 BLD CALC-SCNC: 16 MEQ/L (ref 21–32)
CO2 BLD CALC-SCNC: 16 MEQ/L (ref 21–32)
CO2 BLDV-SCNC: 18 MMOL/L
CO2 SERPL-SCNC: 14 MMOL/L (ref 22–29)
CO2 SERPL-SCNC: 15 MMOL/L (ref 22–29)
CO2 SERPL-SCNC: 15 MMOL/L (ref 22–29)
CO2 SERPL-SCNC: 16 MMOL/L (ref 22–29)
CREAT SERPL-MCNC: 6.8 MG/DL (ref 0.3–1.3)
CREAT SERPL-MCNC: 6.8 MG/DL (ref 0.7–1.2)
CREAT SERPL-MCNC: 7.3 MG/DL (ref 0.3–1.3)
CREAT SERPL-MCNC: 7.3 MG/DL (ref 0.7–1.2)
CREAT SERPL-MCNC: 7.4 MG/DL (ref 0.7–1.2)
CREAT SERPL-MCNC: 7.8 MG/DL (ref 0.7–1.2)
CREAT SERPL-MCNC: 8.4 MG/DL (ref 0.3–1.3)
CREAT SERPL-MCNC: 8.8 MG/DL (ref 0.3–1.3)
D DIMER PPP FEU-MCNC: >20 UG/ML FEU (ref 0–0.48)
DESCRIPTION BLOOD BANK: NORMAL
ECHO LV EF PHYSICIAN: 25 %
ECHO LV EJECTION FRACTION BIPLANE: 25 % (ref 55–100)
ECHO LV FRACTIONAL SHORTENING: 9 % (ref 28–44)
ECHO LV INTERNAL DIMENSION DIASTOLE INDEX: 2.08 CM/M2
ECHO LV INTERNAL DIMENSION DIASTOLIC: 4.5 CM (ref 4.2–5.9)
ECHO LV INTERNAL DIMENSION SYSTOLIC INDEX: 1.9 CM/M2
ECHO LV INTERNAL DIMENSION SYSTOLIC: 4.1 CM
ECHO LV IVSD: 1.5 CM (ref 0.6–1)
ECHO LV MASS 2D: 275.8 G (ref 88–224)
ECHO LV MASS INDEX 2D: 127.7 G/M2 (ref 49–115)
ECHO LV POSTERIOR WALL DIASTOLIC: 1.5 CM (ref 0.6–1)
ECHO LV RELATIVE WALL THICKNESS RATIO: 0.67
ECHO RV INTERNAL DIMENSION: 1.1 CM
EOSINOPHIL # BLD: 0 K/UL (ref 0–0.6)
EOSINOPHIL # BLD: 0.26 K/UL (ref 0–0.6)
EOSINOPHIL NFR BLD: 0.1 % (ref 0–5)
EOSINOPHIL NFR BLD: 1 % (ref 0–5)
ERYTHROCYTE [DISTWIDTH] IN BLOOD BY AUTOMATED COUNT: 13.5 % (ref 11.5–14.5)
ERYTHROCYTE [DISTWIDTH] IN BLOOD BY AUTOMATED COUNT: 13.6 % (ref 11.5–14.5)
ERYTHROCYTE [DISTWIDTH] IN BLOOD BY AUTOMATED COUNT: 14.9 % (ref 11.5–14.5)
ERYTHROCYTE [DISTWIDTH] IN BLOOD BY AUTOMATED COUNT: 15.2 % (ref 11.5–14.5)
ERYTHROCYTE [DISTWIDTH] IN BLOOD BY AUTOMATED COUNT: 15.7 % (ref 11.5–14.5)
ERYTHROCYTE [DISTWIDTH] IN BLOOD BY AUTOMATED COUNT: 15.7 % (ref 11.5–14.5)
ERYTHROCYTE [DISTWIDTH] IN BLOOD BY AUTOMATED COUNT: 16.2 % (ref 11.5–14.5)
ERYTHROCYTE [DISTWIDTH] IN BLOOD BY AUTOMATED COUNT: 16.4 % (ref 11.5–14.5)
FIO2: 100 %
FIO2: 100 %
FIO2: 60 %
FIO2: 70 %
GLUCOSE BLD-MCNC: 165 MG/DL (ref 70–99)
GLUCOSE BLD-MCNC: 184 MG/DL (ref 70–99)
GLUCOSE BLD-MCNC: 184 MG/DL (ref 70–99)
GLUCOSE BLD-MCNC: 195 MG/DL (ref 70–99)
GLUCOSE BLD-MCNC: 219 MG/DL (ref 70–99)
GLUCOSE BLD-MCNC: 250 MG/DL (ref 70–99)
GLUCOSE BLD-MCNC: 294 MG/DL (ref 70–99)
GLUCOSE SERPL-MCNC: 179 MG/DL (ref 70–99)
GLUCOSE SERPL-MCNC: 194 MG/DL (ref 70–99)
GLUCOSE SERPL-MCNC: 221 MG/DL (ref 70–99)
GLUCOSE SERPL-MCNC: 248 MG/DL (ref 70–99)
HCO3 ARTERIAL: 13.9 MMOL/L (ref 22–26)
HCO3 ARTERIAL: 15.3 MMOL/L (ref 22–26)
HCO3 ARTERIAL: 16.7 MMOL/L (ref 22–26)
HCO3 ARTERIAL: 21.4 MMOL/L (ref 22–26)
HCO3 VENOUS: 21 MMOL/L (ref 23–29)
HCT VFR BLD AUTO: 22 % (ref 37–52)
HCT VFR BLD AUTO: 23 % (ref 37–52)
HCT VFR BLD AUTO: 23 % (ref 37–52)
HCT VFR BLD AUTO: 23.1 % (ref 42–52)
HCT VFR BLD AUTO: 24.6 % (ref 42–52)
HCT VFR BLD AUTO: 24.6 % (ref 42–52)
HCT VFR BLD AUTO: 25 % (ref 37–52)
HCT VFR BLD AUTO: 27.6 % (ref 42–52)
HCT VFR BLD AUTO: 27.7 % (ref 42–52)
HCT VFR BLD AUTO: 28.3 % (ref 42–52)
HCT VFR BLD AUTO: 35.6 % (ref 42–52)
HCT VFR BLD AUTO: 37 % (ref 42–52)
HEMOGLOBIN, ART, EXTENDED: 11.9 G/DL (ref 14–18)
HEMOGLOBIN, ART, EXTENDED: 7.8 G/DL (ref 14–18)
HEMOGLOBIN, ART, EXTENDED: 9.3 G/DL (ref 14–18)
HEMOGLOBIN, ART, EXTENDED: 9.6 G/DL (ref 14–18)
HGB BLD CALC-MCNC: 7.4 GM/DL (ref 12–18)
HGB BLD CALC-MCNC: 7.7 GM/DL (ref 12–18)
HGB BLD CALC-MCNC: 7.9 GM/DL (ref 12–18)
HGB BLD CALC-MCNC: 8.3 GM/DL (ref 12–18)
HGB BLD-MCNC: 11.8 G/DL (ref 14–18)
HGB BLD-MCNC: 12.1 G/DL (ref 14–18)
HGB BLD-MCNC: 7 G/DL (ref 14–18)
HGB BLD-MCNC: 7.3 G/DL (ref 14–18)
HGB BLD-MCNC: 7.9 G/DL (ref 14–18)
HGB BLD-MCNC: 8.8 G/DL (ref 14–18)
HGB BLD-MCNC: 8.9 G/DL (ref 14–18)
HGB BLD-MCNC: 9.4 G/DL (ref 14–18)
HYPOCHROMIA BLD QL SMEAR: ABNORMAL
IMM GRANULOCYTES # BLD: 0.2 K/UL
IMM GRANULOCYTES # BLD: 1 K/UL
INR PPP: 2.65 (ref 0.88–1.18)
INR PPP: 3.08 (ref 0.88–1.18)
LACTATE BLDV-SCNC: 11.3 MMOL/L (ref 0.5–1.9)
LACTATE BLDV-SCNC: 13.5 MMOL/L (ref 0.5–1.9)
LACTATE BLDV-SCNC: 18.2 MMOL/L (ref 0.5–1.9)
LACTATE BLDV-SCNC: 9 MMOL/L (ref 0.5–1.9)
LYMPHOCYTES # BLD: 1 K/UL (ref 1.1–4.5)
LYMPHOCYTES # BLD: 5.4 K/UL (ref 1.1–4.5)
LYMPHOCYTES NFR BLD: 21 % (ref 20–40)
LYMPHOCYTES NFR BLD: 6.8 % (ref 20–40)
MACROCYTES BLD QL SMEAR: ABNORMAL
MAGNESIUM SERPL-MCNC: 2.4 MG/DL (ref 1.6–2.4)
MAGNESIUM SERPL-MCNC: 2.8 MG/DL (ref 1.6–2.4)
MCH RBC QN AUTO: 30.3 PG (ref 27–31)
MCH RBC QN AUTO: 30.5 PG (ref 27–31)
MCH RBC QN AUTO: 30.5 PG (ref 27–31)
MCH RBC QN AUTO: 30.7 PG (ref 27–31)
MCH RBC QN AUTO: 31 PG (ref 27–31)
MCH RBC QN AUTO: 31.1 PG (ref 27–31)
MCH RBC QN AUTO: 31.6 PG (ref 27–31)
MCH RBC QN AUTO: 31.7 PG (ref 27–31)
MCHC RBC AUTO-ENTMCNC: 29.7 G/DL (ref 33–37)
MCHC RBC AUTO-ENTMCNC: 30.3 G/DL (ref 33–37)
MCHC RBC AUTO-ENTMCNC: 31.9 G/DL (ref 33–37)
MCHC RBC AUTO-ENTMCNC: 32.1 G/DL (ref 33–37)
MCHC RBC AUTO-ENTMCNC: 32.1 G/DL (ref 33–37)
MCHC RBC AUTO-ENTMCNC: 32.7 G/DL (ref 33–37)
MCHC RBC AUTO-ENTMCNC: 33.1 G/DL (ref 33–37)
MCHC RBC AUTO-ENTMCNC: 33.2 G/DL (ref 33–37)
MCV RBC AUTO: 104.5 FL (ref 80–94)
MCV RBC AUTO: 106.5 FL (ref 80–94)
MCV RBC AUTO: 91.9 FL (ref 80–94)
MCV RBC AUTO: 92 FL (ref 80–94)
MCV RBC AUTO: 92.5 FL (ref 80–94)
MCV RBC AUTO: 95.5 FL (ref 80–94)
MCV RBC AUTO: 96.5 FL (ref 80–94)
MCV RBC AUTO: 97.5 FL (ref 80–94)
MECHANICAL RATE IN BPM: 16
MECHANICAL RATE IN BPM: 16
MECHANICAL RATE IN BPM: 20
MECHANICAL RATE IN BPM: 20
METHEMOGLOBIN ARTERIAL: 1 %
METHEMOGLOBIN ARTERIAL: 1.1 %
METHEMOGLOBIN ARTERIAL: 1.2 %
METHEMOGLOBIN ARTERIAL: 1.4 %
METHEMOGLOBIN VENOUS: 1.2 %
MODE: ABNORMAL
MODE: ABNORMAL
MONOCYTES # BLD: 0.6 K/UL (ref 0–0.9)
MONOCYTES # BLD: 2.6 K/UL (ref 0–0.9)
MONOCYTES NFR BLD: 10 % (ref 0–10)
MONOCYTES NFR BLD: 4.3 % (ref 0–10)
NEUTROPHILS # BLD: 13.1 K/UL (ref 1.5–7.5)
NEUTROPHILS # BLD: 17.2 K/UL (ref 1.5–7.5)
NEUTS BAND NFR BLD MANUAL: 7 % (ref 0–5)
NEUTS SEG NFR BLD: 60 % (ref 50–65)
NEUTS SEG NFR BLD: 87.5 % (ref 50–65)
O2 CONTENT ARTERIAL: 11.2 ML/DL
O2 CONTENT ARTERIAL: 11.7 ML/DL
O2 CONTENT ARTERIAL: 12.2 ML/DL
O2 CONTENT ARTERIAL: 14.4 ML/DL
O2 CONTENT, VEN: 8 ML/DL
O2 SAT, ARTERIAL: 86.2 %
O2 SAT, ARTERIAL: 88.7 %
O2 SAT, ARTERIAL: 90.2 %
O2 SAT, ARTERIAL: 96.9 %
O2 SAT, ARTERIAL: 97 % (ref 93–100)
O2 SAT, ARTERIAL: 99 % (ref 93–100)
O2 SAT, ARTERIAL: 99 % (ref 93–100)
O2 SAT, VEN: 54 %
O2 THERAPY: ABNORMAL
PCO2 ARTERIAL: 27 MMHG (ref 35–45)
PCO2 ARTERIAL: 44 MMHG (ref 35–45)
PCO2 ARTERIAL: 49 MM HG (ref 35–48)
PCO2 ARTERIAL: 49 MM HG (ref 35–48)
PCO2 ARTERIAL: 49 MMHG (ref 35–45)
PCO2 ARTERIAL: 56 MM HG (ref 35–48)
PCO2 ARTERIAL: 63 MMHG (ref 35–45)
PCO2 BLDV: 66.2 MM HG (ref 40–50)
PCO2 VENOUS: 73 MMHG (ref 40–50)
PERFORMED ON: ABNORMAL
PH ARTERIAL: 6.93 (ref 7.3–7.5)
PH ARTERIAL: 7.03 (ref 7.3–7.5)
PH ARTERIAL: 7.13 (ref 7.3–7.5)
PH ARTERIAL: 7.14 (ref 7.35–7.45)
PH ARTERIAL: 7.14 (ref 7.35–7.45)
PH ARTERIAL: 7.15 (ref 7.35–7.45)
PH ARTERIAL: 7.32 (ref 7.35–7.45)
PH BLDV: 6.99 [PH]
PH VENOUS: 7.07 (ref 7.35–7.45)
PLATELET # BLD AUTO: 137 K/UL (ref 130–400)
PLATELET # BLD AUTO: 143 K/UL (ref 130–400)
PLATELET # BLD AUTO: 145 K/UL (ref 130–400)
PLATELET # BLD AUTO: 218 K/UL (ref 130–400)
PLATELET # BLD AUTO: 237 K/UL (ref 130–400)
PLATELET # BLD AUTO: 87 K/UL (ref 130–400)
PLATELET # BLD AUTO: 88 K/UL (ref 130–400)
PLATELET # BLD AUTO: 96 K/UL (ref 130–400)
PLATELET SLIDE REVIEW: ADEQUATE
PMV BLD AUTO: 10.1 FL (ref 9.4–12.4)
PMV BLD AUTO: 10.3 FL (ref 9.4–12.4)
PMV BLD AUTO: 11.1 FL (ref 9.4–12.4)
PMV BLD AUTO: 9.3 FL (ref 9.4–12.4)
PMV BLD AUTO: 9.4 FL (ref 9.4–12.4)
PMV BLD AUTO: 9.5 FL (ref 9.4–12.4)
PMV BLD AUTO: 9.8 FL (ref 9.4–12.4)
PMV BLD AUTO: 9.9 FL (ref 9.4–12.4)
PO2 ARTERIAL: 136 MM HG (ref 83–108)
PO2 ARTERIAL: 162 MM HG (ref 83–108)
PO2 ARTERIAL: 196 MM HG (ref 83–108)
PO2 ARTERIAL: 210 MMHG (ref 80–100)
PO2 ARTERIAL: 60 MMHG (ref 80–100)
PO2 ARTERIAL: 65 MMHG (ref 80–100)
PO2 ARTERIAL: 66 MMHG (ref 80–100)
PO2 BLDV: 52.8 MM HG
PO2 VENOUS: 36 MMHG
POSITIVE END EXP PRESS: 5
POTASSIUM BLD-SCNC: 5 MMOL/L
POTASSIUM BLD-SCNC: 5.4 MMOL/L
POTASSIUM BLD-SCNC: 6.1 MMOL/L
POTASSIUM BLD-SCNC: 6.6 MMOL/L
POTASSIUM SERPL-SCNC: 4.3 MEQ/L (ref 3.5–5.1)
POTASSIUM SERPL-SCNC: 4.6 MEQ/L (ref 3.5–5.1)
POTASSIUM SERPL-SCNC: 4.6 MEQ/L (ref 3.5–5.1)
POTASSIUM SERPL-SCNC: 5.1 MEQ/L (ref 3.5–5.1)
POTASSIUM SERPL-SCNC: 5.1 MMOL/L (ref 3.5–5)
POTASSIUM SERPL-SCNC: 5.1 MMOL/L (ref 3.5–5.1)
POTASSIUM SERPL-SCNC: 5.3 MMOL/L (ref 3.5–5.1)
POTASSIUM SERPL-SCNC: 5.5 MMOL/L (ref 3.5–5.1)
POTASSIUM SERPL-SCNC: 6 MMOL/L (ref 3.5–5)
POTASSIUM SERPL-SCNC: 6.6 MMOL/L (ref 3.5–5.1)
PROT SERPL-MCNC: 6.3 G/DL (ref 6.4–8.3)
PROT SERPL-MCNC: 6.6 G/DL (ref 6.4–8.3)
PROTHROMBIN TIME: 27.8 SEC (ref 12–14.6)
PROTHROMBIN TIME: 31.2 SEC (ref 12–14.6)
RBC # BLD AUTO: 2.21 M/UL (ref 4.7–6.1)
RBC # BLD AUTO: 2.31 M/UL (ref 4.7–6.1)
RBC # BLD AUTO: 2.55 M/UL (ref 4.7–6.1)
RBC # BLD AUTO: 2.83 M/UL (ref 4.7–6.1)
RBC # BLD AUTO: 2.9 M/UL (ref 4.7–6.1)
RBC # BLD AUTO: 3.08 M/UL (ref 4.7–6.1)
RBC # BLD AUTO: 3.87 M/UL (ref 4.7–6.1)
RBC # BLD AUTO: 4 M/UL (ref 4.7–6.1)
SAMPLE SOURCE: ABNORMAL
SAO2 % BLDV: 66 %
SODIUM BLD-SCNC: 140 MEQ/L (ref 136–145)
SODIUM BLD-SCNC: 141 MEQ/L (ref 136–145)
SODIUM BLD-SCNC: 142 MEQ/L (ref 136–145)
SODIUM BLD-SCNC: 143 MEQ/L (ref 136–145)
SODIUM SERPL-SCNC: 139 MMOL/L (ref 136–145)
SODIUM SERPL-SCNC: 140 MMOL/L (ref 136–145)
SODIUM SERPL-SCNC: 141 MMOL/L (ref 136–145)
SODIUM SERPL-SCNC: 142 MMOL/L (ref 136–145)
TCO2 ARTERIAL: 12 MMOL/L
TCO2 ARTERIAL: 16 MMOL/L
TCO2 ARTERIAL: 18 MMOL/L
VT MECHANICAL: 500 %
WBC # BLD AUTO: 11.1 K/UL (ref 4.8–10.8)
WBC # BLD AUTO: 15 K/UL (ref 4.8–10.8)
WBC # BLD AUTO: 15.8 K/UL (ref 4.8–10.8)
WBC # BLD AUTO: 16.3 K/UL (ref 4.8–10.8)
WBC # BLD AUTO: 16.5 K/UL (ref 4.8–10.8)
WBC # BLD AUTO: 17.6 K/UL (ref 4.8–10.8)
WBC # BLD AUTO: 24.1 K/UL (ref 4.8–10.8)
WBC # BLD AUTO: 25.7 K/UL (ref 4.8–10.8)

## 2025-05-04 PROCEDURE — 6360000002 HC RX W HCPCS: Performed by: INTERNAL MEDICINE

## 2025-05-04 PROCEDURE — 85027 COMPLETE CBC AUTOMATED: CPT

## 2025-05-04 PROCEDURE — 84295 ASSAY OF SERUM SODIUM: CPT

## 2025-05-04 PROCEDURE — 86920 COMPATIBILITY TEST SPIN: CPT

## 2025-05-04 PROCEDURE — 85347 COAGULATION TIME ACTIVATED: CPT | Performed by: SURGERY

## 2025-05-04 PROCEDURE — 82435 ASSAY OF BLOOD CHLORIDE: CPT

## 2025-05-04 PROCEDURE — 6360000002 HC RX W HCPCS: Performed by: HOSPITALIST

## 2025-05-04 PROCEDURE — 82810 BLOOD GASES O2 SAT ONLY: CPT

## 2025-05-04 PROCEDURE — 3600000008 HC SURGERY OHS BASE: Performed by: SURGERY

## 2025-05-04 PROCEDURE — 6360000002 HC RX W HCPCS: Performed by: SURGERY

## 2025-05-04 PROCEDURE — 2500000003 HC RX 250 WO HCPCS: Performed by: STUDENT IN AN ORGANIZED HEALTH CARE EDUCATION/TRAINING PROGRAM

## 2025-05-04 PROCEDURE — P9073 PLATELETS PHERESIS PATH REDU: HCPCS

## 2025-05-04 PROCEDURE — 31500 INSERT EMERGENCY AIRWAY: CPT

## 2025-05-04 PROCEDURE — 3700000001 HC ADD 15 MINUTES (ANESTHESIA): Performed by: SURGERY

## 2025-05-04 PROCEDURE — 82374 ASSAY BLOOD CARBON DIOXIDE: CPT

## 2025-05-04 PROCEDURE — 2000000000 HC ICU R&B

## 2025-05-04 PROCEDURE — 2500000003 HC RX 250 WO HCPCS: Performed by: NURSE ANESTHETIST, CERTIFIED REGISTERED

## 2025-05-04 PROCEDURE — 36415 COLL VENOUS BLD VENIPUNCTURE: CPT

## 2025-05-04 PROCEDURE — C1769 GUIDE WIRE: HCPCS | Performed by: SURGERY

## 2025-05-04 PROCEDURE — 85610 PROTHROMBIN TIME: CPT

## 2025-05-04 PROCEDURE — 93005 ELECTROCARDIOGRAM TRACING: CPT | Performed by: SURGERY

## 2025-05-04 PROCEDURE — 2580000003 HC RX 258: Performed by: HOSPITALIST

## 2025-05-04 PROCEDURE — 2500000003 HC RX 250 WO HCPCS: Performed by: SURGERY

## 2025-05-04 PROCEDURE — 33025 INCISION OF HEART SAC: CPT | Performed by: SURGERY

## 2025-05-04 PROCEDURE — 37799 UNLISTED PX VASCULAR SURGERY: CPT

## 2025-05-04 PROCEDURE — 2580000003 HC RX 258: Performed by: SURGERY

## 2025-05-04 PROCEDURE — 4A10X4Z MONITORING OF CENTRAL NERVOUS ELECTRICAL ACTIVITY, EXTERNAL APPROACH: ICD-10-PCS | Performed by: SURGERY

## 2025-05-04 PROCEDURE — 86850 RBC ANTIBODY SCREEN: CPT

## 2025-05-04 PROCEDURE — P9016 RBC LEUKOCYTES REDUCED: HCPCS

## 2025-05-04 PROCEDURE — 3600000018 HC SURGERY OHS ADDTL 15MIN: Performed by: SURGERY

## 2025-05-04 PROCEDURE — 36430 TRANSFUSION BLD/BLD COMPNT: CPT

## 2025-05-04 PROCEDURE — 80053 COMPREHEN METABOLIC PANEL: CPT

## 2025-05-04 PROCEDURE — P9012 CRYOPRECIPITATE EACH UNIT: HCPCS

## 2025-05-04 PROCEDURE — 71045 X-RAY EXAM CHEST 1 VIEW: CPT

## 2025-05-04 PROCEDURE — 32160 OPEN CHEST HEART MASSAGE: CPT | Performed by: SURGERY

## 2025-05-04 PROCEDURE — 02HA0RZ INSERTION OF SHORT-TERM EXTERNAL HEART ASSIST SYSTEM INTO HEART, OPEN APPROACH: ICD-10-PCS | Performed by: SURGERY

## 2025-05-04 PROCEDURE — 86901 BLOOD TYPING SEROLOGIC RH(D): CPT

## 2025-05-04 PROCEDURE — P9045 ALBUMIN (HUMAN), 5%, 250 ML: HCPCS | Performed by: SURGERY

## 2025-05-04 PROCEDURE — 82947 ASSAY GLUCOSE BLOOD QUANT: CPT

## 2025-05-04 PROCEDURE — 82803 BLOOD GASES ANY COMBINATION: CPT

## 2025-05-04 PROCEDURE — B245ZZ4 ULTRASONOGRAPHY OF LEFT HEART, TRANSESOPHAGEAL: ICD-10-PCS | Performed by: SURGERY

## 2025-05-04 PROCEDURE — 82330 ASSAY OF CALCIUM: CPT

## 2025-05-04 PROCEDURE — 6370000000 HC RX 637 (ALT 250 FOR IP): Performed by: STUDENT IN AN ORGANIZED HEALTH CARE EDUCATION/TRAINING PROGRAM

## 2025-05-04 PROCEDURE — 99223 1ST HOSP IP/OBS HIGH 75: CPT | Performed by: SURGERY

## 2025-05-04 PROCEDURE — 6360000002 HC RX W HCPCS: Performed by: STUDENT IN AN ORGANIZED HEALTH CARE EDUCATION/TRAINING PROGRAM

## 2025-05-04 PROCEDURE — 85025 COMPLETE CBC W/AUTO DIFF WBC: CPT

## 2025-05-04 PROCEDURE — 85730 THROMBOPLASTIN TIME PARTIAL: CPT

## 2025-05-04 PROCEDURE — 82800 BLOOD PH: CPT

## 2025-05-04 PROCEDURE — 94760 N-INVAS EAR/PLS OXIMETRY 1: CPT

## 2025-05-04 PROCEDURE — P9017 PLASMA 1 DONOR FRZ W/IN 8 HR: HCPCS

## 2025-05-04 PROCEDURE — 0BH17EZ INSERTION OF ENDOTRACHEAL AIRWAY INTO TRACHEA, VIA NATURAL OR ARTIFICIAL OPENING: ICD-10-PCS | Performed by: SURGERY

## 2025-05-04 PROCEDURE — 33990 INSJ PERQ VAD L HRT ARTERIAL: CPT | Performed by: INTERNAL MEDICINE

## 2025-05-04 PROCEDURE — 6370000000 HC RX 637 (ALT 250 FOR IP): Performed by: NURSE PRACTITIONER

## 2025-05-04 PROCEDURE — 83735 ASSAY OF MAGNESIUM: CPT

## 2025-05-04 PROCEDURE — 99223 1ST HOSP IP/OBS HIGH 75: CPT | Performed by: INTERNAL MEDICINE

## 2025-05-04 PROCEDURE — 93306 TTE W/DOPPLER COMPLETE: CPT

## 2025-05-04 PROCEDURE — 5A1955Z RESPIRATORY VENTILATION, GREATER THAN 96 CONSECUTIVE HOURS: ICD-10-PCS | Performed by: SURGERY

## 2025-05-04 PROCEDURE — 3600000091 HC PERFUSION PUMP OFF: Performed by: SURGERY

## 2025-05-04 PROCEDURE — 85014 HEMATOCRIT: CPT

## 2025-05-04 PROCEDURE — 83605 ASSAY OF LACTIC ACID: CPT

## 2025-05-04 PROCEDURE — 2709999900 HC NON-CHARGEABLE SUPPLY: Performed by: SURGERY

## 2025-05-04 PROCEDURE — 6370000000 HC RX 637 (ALT 250 FOR IP): Performed by: SURGERY

## 2025-05-04 PROCEDURE — 6360000002 HC RX W HCPCS: Performed by: NURSE ANESTHETIST, CERTIFIED REGISTERED

## 2025-05-04 PROCEDURE — 2580000003 HC RX 258: Performed by: INTERNAL MEDICINE

## 2025-05-04 PROCEDURE — 2700000000 HC OXYGEN THERAPY PER DAY

## 2025-05-04 PROCEDURE — 2500000003 HC RX 250 WO HCPCS: Performed by: HOSPITALIST

## 2025-05-04 PROCEDURE — 2580000003 HC RX 258: Performed by: STUDENT IN AN ORGANIZED HEALTH CARE EDUCATION/TRAINING PROGRAM

## 2025-05-04 PROCEDURE — C1729 CATH, DRAINAGE: HCPCS | Performed by: SURGERY

## 2025-05-04 PROCEDURE — P9059 PLASMA, FRZ BETWEEN 8-24HOUR: HCPCS

## 2025-05-04 PROCEDURE — 86900 BLOOD TYPING SEROLOGIC ABO: CPT

## 2025-05-04 PROCEDURE — 2580000003 HC RX 258: Performed by: NURSE ANESTHETIST, CERTIFIED REGISTERED

## 2025-05-04 PROCEDURE — 36600 WITHDRAWAL OF ARTERIAL BLOOD: CPT

## 2025-05-04 PROCEDURE — 85379 FIBRIN DEGRADATION QUANT: CPT

## 2025-05-04 PROCEDURE — 82962 GLUCOSE BLOOD TEST: CPT

## 2025-05-04 PROCEDURE — 5A0221D ASSISTANCE WITH CARDIAC OUTPUT USING IMPELLER PUMP, CONTINUOUS: ICD-10-PCS | Performed by: SURGERY

## 2025-05-04 PROCEDURE — 3700000000 HC ANESTHESIA ATTENDED CARE: Performed by: SURGERY

## 2025-05-04 PROCEDURE — C1889 IMPLANT/INSERT DEVICE, NOC: HCPCS | Performed by: SURGERY

## 2025-05-04 PROCEDURE — 82565 ASSAY OF CREATININE: CPT

## 2025-05-04 PROCEDURE — 93306 TTE W/DOPPLER COMPLETE: CPT | Performed by: INTERNAL MEDICINE

## 2025-05-04 PROCEDURE — 84132 ASSAY OF SERUM POTASSIUM: CPT

## 2025-05-04 PROCEDURE — 6360000002 HC RX W HCPCS

## 2025-05-04 PROCEDURE — 94002 VENT MGMT INPAT INIT DAY: CPT

## 2025-05-04 PROCEDURE — 92950 HEART/LUNG RESUSCITATION CPR: CPT

## 2025-05-04 PROCEDURE — C1768 GRAFT, VASCULAR: HCPCS | Performed by: SURGERY

## 2025-05-04 PROCEDURE — 2720000010 HC SURG SUPPLY STERILE: Performed by: SURGERY

## 2025-05-04 DEVICE — CLIP LIG M BLU TI HRT SHP WIRE HORZ 6 CLIPS PER PK: Type: IMPLANTABLE DEVICE | Status: FUNCTIONAL

## 2025-05-04 DEVICE — GRAFT VSCLR L30CM D10MM THRCC STRGHT DBLE VLR PASS SWNG TUBE: Type: IMPLANTABLE DEVICE | Site: HEART | Status: FUNCTIONAL

## 2025-05-04 RX ORDER — MEPERIDINE HYDROCHLORIDE 25 MG/ML
25 INJECTION INTRAMUSCULAR; INTRAVENOUS; SUBCUTANEOUS
Status: DISCONTINUED | OUTPATIENT
Start: 2025-05-04 | End: 2025-05-08 | Stop reason: HOSPADM

## 2025-05-04 RX ORDER — ONDANSETRON 4 MG/1
4 TABLET, ORALLY DISINTEGRATING ORAL EVERY 8 HOURS PRN
Status: DISCONTINUED | OUTPATIENT
Start: 2025-05-04 | End: 2025-05-08 | Stop reason: HOSPADM

## 2025-05-04 RX ORDER — DEXTROSE MONOHYDRATE 100 MG/ML
INJECTION, SOLUTION INTRAVENOUS CONTINUOUS PRN
Status: DISCONTINUED | OUTPATIENT
Start: 2025-05-04 | End: 2025-05-08 | Stop reason: HOSPADM

## 2025-05-04 RX ORDER — DOBUTAMINE HYDROCHLORIDE 200 MG/100ML
2.5-1 INJECTION INTRAVENOUS CONTINUOUS
Status: DISCONTINUED | OUTPATIENT
Start: 2025-05-04 | End: 2025-05-06

## 2025-05-04 RX ORDER — INDOMETHACIN 25 MG/1
CAPSULE ORAL DAILY PRN
Status: COMPLETED | OUTPATIENT
Start: 2025-05-04 | End: 2025-05-04

## 2025-05-04 RX ORDER — PROTAMINE SULFATE 10 MG/ML
50 INJECTION, SOLUTION INTRAVENOUS
Status: DISCONTINUED | OUTPATIENT
Start: 2025-05-04 | End: 2025-05-08 | Stop reason: HOSPADM

## 2025-05-04 RX ORDER — CLOPIDOGREL BISULFATE 75 MG/1
75 TABLET ORAL DAILY
Status: DISCONTINUED | OUTPATIENT
Start: 2025-05-05 | End: 2025-05-08 | Stop reason: HOSPADM

## 2025-05-04 RX ORDER — DEXTROSE MONOHYDRATE 25 G/50ML
25 INJECTION, SOLUTION INTRAVENOUS PRN
Status: DISCONTINUED | OUTPATIENT
Start: 2025-05-04 | End: 2025-05-08 | Stop reason: HOSPADM

## 2025-05-04 RX ORDER — ATROPINE SULFATE 0.1 MG/ML
INJECTION INTRAVENOUS DAILY PRN
Status: COMPLETED | OUTPATIENT
Start: 2025-05-04 | End: 2025-05-04

## 2025-05-04 RX ORDER — SODIUM CHLORIDE 0.9 % (FLUSH) 0.9 %
5-40 SYRINGE (ML) INJECTION PRN
Status: DISCONTINUED | OUTPATIENT
Start: 2025-05-04 | End: 2025-05-08 | Stop reason: SDUPTHER

## 2025-05-04 RX ORDER — MORPHINE SULFATE 2 MG/ML
2 INJECTION, SOLUTION INTRAMUSCULAR; INTRAVENOUS
Status: DISCONTINUED | OUTPATIENT
Start: 2025-05-04 | End: 2025-05-06

## 2025-05-04 RX ORDER — ROCURONIUM BROMIDE 10 MG/ML
INJECTION, SOLUTION INTRAVENOUS
Status: DISCONTINUED | OUTPATIENT
Start: 2025-05-04 | End: 2025-05-04 | Stop reason: SDUPTHER

## 2025-05-04 RX ORDER — MAGNESIUM SULFATE HEPTAHYDRATE 500 MG/ML
INJECTION, SOLUTION INTRAMUSCULAR; INTRAVENOUS
Status: DISCONTINUED | OUTPATIENT
Start: 2025-05-04 | End: 2025-05-04 | Stop reason: SDUPTHER

## 2025-05-04 RX ORDER — SODIUM CHLORIDE 9 MG/ML
INJECTION, SOLUTION INTRAVENOUS PRN
Status: DISCONTINUED | OUTPATIENT
Start: 2025-05-04 | End: 2025-05-05 | Stop reason: ALTCHOICE

## 2025-05-04 RX ORDER — MORPHINE SULFATE 4 MG/ML
4 INJECTION, SOLUTION INTRAMUSCULAR; INTRAVENOUS
Status: DISCONTINUED | OUTPATIENT
Start: 2025-05-04 | End: 2025-05-06

## 2025-05-04 RX ORDER — GLUCAGON 1 MG/ML
1 KIT INJECTION PRN
Status: DISCONTINUED | OUTPATIENT
Start: 2025-05-04 | End: 2025-05-08 | Stop reason: HOSPADM

## 2025-05-04 RX ORDER — OXYCODONE HYDROCHLORIDE 10 MG/1
10 TABLET ORAL EVERY 4 HOURS PRN
Status: DISCONTINUED | OUTPATIENT
Start: 2025-05-04 | End: 2025-05-08 | Stop reason: HOSPADM

## 2025-05-04 RX ORDER — ATORVASTATIN CALCIUM 20 MG/1
20 TABLET, FILM COATED ORAL NIGHTLY
Status: DISCONTINUED | OUTPATIENT
Start: 2025-05-05 | End: 2025-05-04 | Stop reason: SDUPTHER

## 2025-05-04 RX ORDER — SODIUM CHLORIDE, SODIUM LACTATE, POTASSIUM CHLORIDE, CALCIUM CHLORIDE 600; 310; 30; 20 MG/100ML; MG/100ML; MG/100ML; MG/100ML
INJECTION, SOLUTION INTRAVENOUS
Status: DISCONTINUED | OUTPATIENT
Start: 2025-05-04 | End: 2025-05-04 | Stop reason: SDUPTHER

## 2025-05-04 RX ORDER — ZOLPIDEM TARTRATE 5 MG/1
5 TABLET ORAL NIGHTLY PRN
Status: DISCONTINUED | OUTPATIENT
Start: 2025-05-05 | End: 2025-05-08 | Stop reason: HOSPADM

## 2025-05-04 RX ORDER — INDOMETHACIN 25 MG/1
CAPSULE ORAL
Status: DISCONTINUED | OUTPATIENT
Start: 2025-05-04 | End: 2025-05-04 | Stop reason: SDUPTHER

## 2025-05-04 RX ORDER — ALBUMIN HUMAN 50 G/1000ML
25 SOLUTION INTRAVENOUS ONCE
Status: COMPLETED | OUTPATIENT
Start: 2025-05-04 | End: 2025-05-04

## 2025-05-04 RX ORDER — HEPARIN SODIUM 1000 [USP'U]/ML
INJECTION, SOLUTION INTRAVENOUS; SUBCUTANEOUS
Status: DISCONTINUED | OUTPATIENT
Start: 2025-05-04 | End: 2025-05-04 | Stop reason: SDUPTHER

## 2025-05-04 RX ORDER — MIDAZOLAM HYDROCHLORIDE 2 MG/2ML
2 INJECTION, SOLUTION INTRAMUSCULAR; INTRAVENOUS ONCE
Status: COMPLETED | OUTPATIENT
Start: 2025-05-04 | End: 2025-05-04

## 2025-05-04 RX ORDER — SODIUM CHLORIDE 9 MG/ML
INJECTION, SOLUTION INTRAVENOUS PRN
Status: DISCONTINUED | OUTPATIENT
Start: 2025-05-04 | End: 2025-05-08 | Stop reason: SDUPTHER

## 2025-05-04 RX ORDER — MIDAZOLAM HYDROCHLORIDE 1 MG/ML
INJECTION, SOLUTION INTRAMUSCULAR; INTRAVENOUS
Status: DISCONTINUED
Start: 2025-05-04 | End: 2025-05-05

## 2025-05-04 RX ORDER — SODIUM CHLORIDE 0.9 % (FLUSH) 0.9 %
5-40 SYRINGE (ML) INJECTION EVERY 12 HOURS SCHEDULED
Status: DISCONTINUED | OUTPATIENT
Start: 2025-05-04 | End: 2025-05-04

## 2025-05-04 RX ORDER — EPINEPHRINE 1 MG/ML
INJECTION, SOLUTION, CONCENTRATE INTRAVENOUS
Status: DISCONTINUED | OUTPATIENT
Start: 2025-05-04 | End: 2025-05-04 | Stop reason: SDUPTHER

## 2025-05-04 RX ORDER — FENTANYL CITRATE-0.9 % NACL/PF 10 MCG/ML
25-200 PLASTIC BAG, INJECTION (ML) INTRAVENOUS CONTINUOUS
Refills: 0 | Status: DISCONTINUED | OUTPATIENT
Start: 2025-05-04 | End: 2025-05-04

## 2025-05-04 RX ORDER — PHENYLEPHRINE HYDROCHLORIDE 10 MG/ML
INJECTION INTRAVENOUS
Status: DISPENSED
Start: 2025-05-04 | End: 2025-05-04

## 2025-05-04 RX ORDER — 0.9 % SODIUM CHLORIDE 0.9 %
500 INTRAVENOUS SOLUTION INTRAVENOUS CONTINUOUS PRN
Status: DISCONTINUED | OUTPATIENT
Start: 2025-05-04 | End: 2025-05-08 | Stop reason: HOSPADM

## 2025-05-04 RX ORDER — NOREPINEPHRINE BITARTRATE 0.06 MG/ML
1-100 INJECTION, SOLUTION INTRAVENOUS CONTINUOUS
Status: DISCONTINUED | OUTPATIENT
Start: 2025-05-04 | End: 2025-05-05 | Stop reason: SDUPTHER

## 2025-05-04 RX ORDER — POTASSIUM CHLORIDE 29.8 MG/ML
20 INJECTION INTRAVENOUS PRN
Status: DISCONTINUED | OUTPATIENT
Start: 2025-05-04 | End: 2025-05-08 | Stop reason: HOSPADM

## 2025-05-04 RX ORDER — NOREPINEPHRINE BITARTRATE 0.06 MG/ML
1-100 INJECTION, SOLUTION INTRAVENOUS CONTINUOUS
Status: DISCONTINUED | OUTPATIENT
Start: 2025-05-04 | End: 2025-05-06

## 2025-05-04 RX ORDER — MAGNESIUM SULFATE IN WATER 40 MG/ML
2000 INJECTION, SOLUTION INTRAVENOUS PRN
Status: DISCONTINUED | OUTPATIENT
Start: 2025-05-04 | End: 2025-05-08 | Stop reason: HOSPADM

## 2025-05-04 RX ORDER — CEFAZOLIN SODIUM 1 G/3ML
INJECTION, POWDER, FOR SOLUTION INTRAMUSCULAR; INTRAVENOUS
Status: DISCONTINUED | OUTPATIENT
Start: 2025-05-04 | End: 2025-05-04 | Stop reason: SDUPTHER

## 2025-05-04 RX ORDER — OXYCODONE HYDROCHLORIDE 5 MG/1
5 TABLET ORAL EVERY 4 HOURS PRN
Status: DISCONTINUED | OUTPATIENT
Start: 2025-05-04 | End: 2025-05-08 | Stop reason: HOSPADM

## 2025-05-04 RX ORDER — VANCOMYCIN HYDROCHLORIDE 1 G/20ML
INJECTION, POWDER, LYOPHILIZED, FOR SOLUTION INTRAVENOUS PRN
Status: DISCONTINUED | OUTPATIENT
Start: 2025-05-04 | End: 2025-05-04 | Stop reason: ALTCHOICE

## 2025-05-04 RX ORDER — ALBUMIN (HUMAN) 12.5 G/50ML
25 SOLUTION INTRAVENOUS EVERY 6 HOURS
Status: DISCONTINUED | OUTPATIENT
Start: 2025-05-05 | End: 2025-05-08 | Stop reason: HOSPADM

## 2025-05-04 RX ORDER — ALBUMIN HUMAN 50 G/1000ML
SOLUTION INTRAVENOUS
Status: DISPENSED
Start: 2025-05-04 | End: 2025-05-04

## 2025-05-04 RX ORDER — INSULIN GLARGINE 100 [IU]/ML
0.15 INJECTION, SOLUTION SUBCUTANEOUS NIGHTLY
Status: DISCONTINUED | OUTPATIENT
Start: 2025-05-05 | End: 2025-05-08 | Stop reason: HOSPADM

## 2025-05-04 RX ORDER — MIDAZOLAM HYDROCHLORIDE 2 MG/2ML
2 INJECTION, SOLUTION INTRAMUSCULAR; INTRAVENOUS
Status: DISCONTINUED | OUTPATIENT
Start: 2025-05-04 | End: 2025-05-06

## 2025-05-04 RX ORDER — SODIUM CHLORIDE 9 MG/ML
INJECTION, SOLUTION INTRAVENOUS
Status: DISCONTINUED | OUTPATIENT
Start: 2025-05-04 | End: 2025-05-04 | Stop reason: SDUPTHER

## 2025-05-04 RX ORDER — EPINEPHRINE IN SOD CHLOR,ISO 1 MG/10 ML
SYRINGE (ML) INTRAVENOUS DAILY PRN
Status: COMPLETED | OUTPATIENT
Start: 2025-05-04 | End: 2025-05-04

## 2025-05-04 RX ORDER — DOBUTAMINE HYDROCHLORIDE 200 MG/100ML
INJECTION INTRAVENOUS
Status: COMPLETED
Start: 2025-05-04 | End: 2025-05-04

## 2025-05-04 RX ORDER — CALCIUM GLUCONATE 20 MG/ML
1000 INJECTION, SOLUTION INTRAVENOUS ONCE
Status: COMPLETED | OUTPATIENT
Start: 2025-05-04 | End: 2025-05-04

## 2025-05-04 RX ORDER — ONDANSETRON 2 MG/ML
4 INJECTION INTRAMUSCULAR; INTRAVENOUS EVERY 6 HOURS PRN
Status: DISCONTINUED | OUTPATIENT
Start: 2025-05-04 | End: 2025-05-08 | Stop reason: HOSPADM

## 2025-05-04 RX ADMIN — PHENYLEPHRINE HYDROCHLORIDE 300 MCG/MIN: 50 INJECTION INTRAVENOUS at 17:03

## 2025-05-04 RX ADMIN — ATROPINE SULFATE 1 MG: 0.1 INJECTION, SOLUTION INTRAVENOUS at 01:52

## 2025-05-04 RX ADMIN — MAGNESIUM SULFATE HEPTAHYDRATE 2 G: 500 INJECTION, SOLUTION INTRAMUSCULAR; INTRAVENOUS at 07:17

## 2025-05-04 RX ADMIN — AZITHROMYCIN MONOHYDRATE 500 MG: 500 INJECTION, POWDER, LYOPHILIZED, FOR SOLUTION INTRAVENOUS at 04:16

## 2025-05-04 RX ADMIN — INSULIN LISPRO 2 UNITS: 100 INJECTION, SOLUTION INTRAVENOUS; SUBCUTANEOUS at 16:45

## 2025-05-04 RX ADMIN — SODIUM BICARBONATE 50 MEQ: 84 INJECTION, SOLUTION INTRAVENOUS at 01:40

## 2025-05-04 RX ADMIN — SODIUM CHLORIDE, SODIUM LACTATE, POTASSIUM CHLORIDE, AND CALCIUM CHLORIDE: 600; 310; 30; 20 INJECTION, SOLUTION INTRAVENOUS at 06:55

## 2025-05-04 RX ADMIN — Medication 1 MG: at 01:38

## 2025-05-04 RX ADMIN — INSULIN LISPRO 2 UNITS: 100 INJECTION, SOLUTION INTRAVENOUS; SUBCUTANEOUS at 20:33

## 2025-05-04 RX ADMIN — Medication 100 MCG/MIN: at 14:19

## 2025-05-04 RX ADMIN — EPINEPHRINE 40 MCG: 1 INJECTION, SOLUTION, CONCENTRATE INTRAVENOUS at 07:14

## 2025-05-04 RX ADMIN — VASOPRESSIN 0.03 UNITS/MIN: 20 INJECTION INTRAVENOUS at 05:42

## 2025-05-04 RX ADMIN — EPINEPHRINE 1 MG: 1 INJECTION, SOLUTION, CONCENTRATE INTRAVENOUS at 07:21

## 2025-05-04 RX ADMIN — Medication 1 MG: at 01:35

## 2025-05-04 RX ADMIN — INSULIN LISPRO 1 UNITS: 100 INJECTION, SOLUTION INTRAVENOUS; SUBCUTANEOUS at 11:04

## 2025-05-04 RX ADMIN — SODIUM BICARBONATE 50 MEQ: 84 INJECTION, SOLUTION INTRAVENOUS at 01:49

## 2025-05-04 RX ADMIN — DOBUTAMINE HYDROCHLORIDE 2.5 MCG/KG/MIN: 200 INJECTION INTRAVENOUS at 06:15

## 2025-05-04 RX ADMIN — ALBUMIN (HUMAN) 25 G: 12.5 INJECTION, SOLUTION INTRAVENOUS at 12:04

## 2025-05-04 RX ADMIN — SODIUM BICARBONATE 50 MEQ: 84 INJECTION, SOLUTION INTRAVENOUS at 07:40

## 2025-05-04 RX ADMIN — Medication 50 MCG/MIN: at 02:03

## 2025-05-04 RX ADMIN — INSULIN HUMAN 10 UNITS: 100 INJECTION, SOLUTION PARENTERAL at 04:10

## 2025-05-04 RX ADMIN — DEXTROSE MONOHYDRATE 25 G: 25 INJECTION, SOLUTION INTRAVENOUS at 04:10

## 2025-05-04 RX ADMIN — MIDAZOLAM HYDROCHLORIDE 2 MG: 1 INJECTION, SOLUTION INTRAMUSCULAR; INTRAVENOUS at 23:18

## 2025-05-04 RX ADMIN — SODIUM BICARBONATE 100 ML/HR: 84 INJECTION, SOLUTION INTRAVENOUS at 09:23

## 2025-05-04 RX ADMIN — MIDAZOLAM HYDROCHLORIDE 2 MG: 1 INJECTION, SOLUTION INTRAMUSCULAR; INTRAVENOUS at 21:04

## 2025-05-04 RX ADMIN — ACETAMINOPHEN 650 MG: 325 TABLET ORAL at 00:55

## 2025-05-04 RX ADMIN — EPINEPHRINE 20 MCG/MIN: 1 INJECTION INTRAMUSCULAR; INTRAVENOUS; SUBCUTANEOUS at 04:28

## 2025-05-04 RX ADMIN — Medication 100 MCG/MIN: at 17:02

## 2025-05-04 RX ADMIN — SODIUM BICARBONATE: 84 INJECTION, SOLUTION INTRAVENOUS at 11:03

## 2025-05-04 RX ADMIN — EPINEPHRINE 8 MCG: 1 INJECTION, SOLUTION, CONCENTRATE INTRAVENOUS at 07:13

## 2025-05-04 RX ADMIN — SODIUM BICARBONATE: 84 INJECTION, SOLUTION INTRAVENOUS at 17:00

## 2025-05-04 RX ADMIN — DEXTROSE MONOHYDRATE 25 G: 25 INJECTION, SOLUTION INTRAVENOUS at 06:53

## 2025-05-04 RX ADMIN — PHENYLEPHRINE HYDROCHLORIDE 250 MCG/MIN: 50 INJECTION INTRAVENOUS at 03:00

## 2025-05-04 RX ADMIN — VASOPRESSIN 0.03 UNITS/MIN: 20 INJECTION INTRAVENOUS at 13:22

## 2025-05-04 RX ADMIN — WATER 2000 MG: 1 INJECTION INTRAMUSCULAR; INTRAVENOUS; SUBCUTANEOUS at 13:52

## 2025-05-04 RX ADMIN — SODIUM CHLORIDE: 9 INJECTION, SOLUTION INTRAVENOUS at 07:16

## 2025-05-04 RX ADMIN — WATER 1000 MG: 1 INJECTION INTRAMUSCULAR; INTRAVENOUS; SUBCUTANEOUS at 04:04

## 2025-05-04 RX ADMIN — HEPARIN SODIUM 5000 UNITS: 1000 INJECTION, SOLUTION INTRAVENOUS; SUBCUTANEOUS at 08:44

## 2025-05-04 RX ADMIN — EPINEPHRINE 1 MG: 1 INJECTION, SOLUTION, CONCENTRATE INTRAVENOUS at 07:15

## 2025-05-04 RX ADMIN — EPINEPHRINE 1 MG: 1 INJECTION, SOLUTION, CONCENTRATE INTRAVENOUS at 07:18

## 2025-05-04 RX ADMIN — ATROPINE SULFATE 1 MG: 0.1 INJECTION, SOLUTION INTRAVENOUS at 01:48

## 2025-05-04 RX ADMIN — CALCIUM CHLORIDE 2000 MG: 100 INJECTION INTRAVENOUS; INTRAVENTRICULAR at 11:39

## 2025-05-04 RX ADMIN — Medication 1 MG: at 01:50

## 2025-05-04 RX ADMIN — Medication 100 MCG/MIN: at 11:57

## 2025-05-04 RX ADMIN — CALCIUM GLUCONATE 1000 MG: 20 INJECTION, SOLUTION INTRAVENOUS at 04:20

## 2025-05-04 RX ADMIN — SODIUM CHLORIDE, PRESERVATIVE FREE 40 MG: 5 INJECTION INTRAVENOUS at 13:52

## 2025-05-04 RX ADMIN — PHENYLEPHRINE HYDROCHLORIDE 300 MCG/MIN: 50 INJECTION INTRAVENOUS at 14:21

## 2025-05-04 RX ADMIN — MIDAZOLAM HYDROCHLORIDE 2 MG: 1 INJECTION, SOLUTION INTRAMUSCULAR; INTRAVENOUS at 18:53

## 2025-05-04 RX ADMIN — PHENYLEPHRINE HYDROCHLORIDE 300 MCG/MIN: 50 INJECTION INTRAVENOUS at 11:38

## 2025-05-04 RX ADMIN — PHENYLEPHRINE HYDROCHLORIDE 300 MCG/MIN: 50 INJECTION INTRAVENOUS at 19:59

## 2025-05-04 RX ADMIN — INSULIN HUMAN 10 UNITS: 100 INJECTION, SOLUTION PARENTERAL at 06:51

## 2025-05-04 RX ADMIN — ROCURONIUM BROMIDE 50 MG: 10 INJECTION, SOLUTION INTRAVENOUS at 07:29

## 2025-05-04 RX ADMIN — MIDAZOLAM HYDROCHLORIDE 2 MG: 1 INJECTION, SOLUTION INTRAMUSCULAR; INTRAVENOUS at 16:39

## 2025-05-04 RX ADMIN — Medication 50 MCG/HR: at 04:26

## 2025-05-04 RX ADMIN — ROCURONIUM BROMIDE 50 MG: 10 INJECTION, SOLUTION INTRAVENOUS at 07:58

## 2025-05-04 RX ADMIN — Medication 1 MG: at 01:41

## 2025-05-04 RX ADMIN — CEFAZOLIN 2 G: 1 INJECTION, POWDER, FOR SOLUTION INTRAMUSCULAR; INTRAVENOUS at 07:08

## 2025-05-04 ASSESSMENT — PULMONARY FUNCTION TESTS
PIF_VALUE: 30
PIF_VALUE: 14
PIF_VALUE: 31
PIF_VALUE: 17
PIF_VALUE: 20
PIF_VALUE: 33
PIF_VALUE: 32
PIF_VALUE: 31
PIF_VALUE: 30
PIF_VALUE: 28
PIF_VALUE: 14
PIF_VALUE: 17
PIF_VALUE: 5.3
PIF_VALUE: 14
PIF_VALUE: 31
PIF_VALUE: 27
PIF_VALUE: 31
PIF_VALUE: 22
PIF_VALUE: 18
PIF_VALUE: 29
PIF_VALUE: 23
PIF_VALUE: 17
PIF_VALUE: 31
PIF_VALUE: 14
PIF_VALUE: 30
PIF_VALUE: 16
PIF_VALUE: 15
PIF_VALUE: 18
PIF_VALUE: 34
PIF_VALUE: 16
PIF_VALUE: 30
PIF_VALUE: 36
PIF_VALUE: 34
PIF_VALUE: 25
PIF_VALUE: 17
PIF_VALUE: 27
PIF_VALUE: 21
PIF_VALUE: 5.3
PIF_VALUE: 19
PIF_VALUE: 18
PIF_VALUE: 32
PIF_VALUE: 15
PIF_VALUE: 16
PIF_VALUE: 30
PIF_VALUE: 36
PIF_VALUE: 22
PIF_VALUE: 20
PIF_VALUE: 40

## 2025-05-04 ASSESSMENT — PAIN DESCRIPTION - DESCRIPTORS: DESCRIPTORS: ACHING;DISCOMFORT

## 2025-05-04 ASSESSMENT — PAIN - FUNCTIONAL ASSESSMENT: PAIN_FUNCTIONAL_ASSESSMENT: PREVENTS OR INTERFERES SOME ACTIVE ACTIVITIES AND ADLS

## 2025-05-04 ASSESSMENT — PAIN DESCRIPTION - LOCATION: LOCATION: NECK

## 2025-05-04 NOTE — PROGRESS NOTES
EKG changes noted on bedside monitor. EKG ordered and placed on soft chart. EKG shows AFIB at 130.

## 2025-05-04 NOTE — PROGRESS NOTES
Hospitalist Progress Note  [facility]     Patient: Casimiro Vinson  : 1965  MRN: [id number]  Code Status: Full Code    Hospital Day: 1   Date of Service: 2025    Subjective:   Patient seen and examined.  Returned from OR intubated on multiple vasopressors/inotrope.    Past Medical History:   Diagnosis Date    Diabetes (HCC)     Dialysis patient     ESRD (end stage renal disease) (HCC)        Past Surgical History:   Procedure Laterality Date    CORONARY ARTERY BYPASS GRAFT      VASCULAR SURGERY  2023    Ultrasound guided cannulation of right internal jugular vein AND  Placement of right internal jugular vein tunneled dialysis catheter (Bard Equistream XK 23 cm tip to cuff). VI       History reviewed. No pertinent family history.    Social History     Socioeconomic History    Marital status:      Spouse name: Not on file    Number of children: Not on file    Years of education: Not on file    Highest education level: Not on file   Occupational History    Not on file   Tobacco Use    Smoking status: Former     Current packs/day: 0.00     Average packs/day: 0.5 packs/day for 14.0 years (7.0 ttl pk-yrs)     Types: Cigarettes     Start date: 1984     Quit date: 1998     Years since quittin.3     Passive exposure: Past    Smokeless tobacco: Current     Types: Chew, Snuff   Vaping Use    Vaping status: Not on file   Substance and Sexual Activity    Alcohol use: Never    Drug use: Never    Sexual activity: Not on file   Other Topics Concern    Not on file   Social History Narrative    Not on file     Social Drivers of Health     Financial Resource Strain: Not on file   Food Insecurity: No Food Insecurity (5/3/2025)    Hunger Vital Sign     Worried About Running Out of Food in the Last Year: Never true     Ran Out of Food in the Last Year: Never true   Transportation Needs: No Transportation Needs (5/3/2025)    PRAPARE - Transportation     Lack of Transportation

## 2025-05-04 NOTE — BRIEF OP NOTE
Brief Postoperative Note      Patient: Casimiro Vinson  YOB: 1965  MRN: [id number]    Date of Procedure: 5/4/2025    Pre-Op Diagnosis Codes:      * Cardiac tamponade [I31.4]    Post-Op Diagnosis: Same       Procedure(s):  PERICARDIAL WINDOW CREATION OPEN; WITH PERRY; WITH PERFUSION;PLACEMENT OF 5.5 IMPELLA DEVICE    Surgeon(s):  Crow Soriano MD McDermott, Timothy J, MD    Assistant:  First Assistant: Danna Hernandez RN    Anesthesia: General    Estimated Blood Loss (mL): 500    Complications: Cardiac Arrest    Specimens:   * No specimens in log *    Implants:  Implant Name Type Inv. Item Serial No.  Lot No. LRB No. Used Action   CLIP LIG M ZAID TI HRT SHP WIRE HORZ 6 CLIPS PER PK - [id number]  CLIP LIG M ZAID TI HRT SHP WIRE HORZ 6 CLIPS PER PK  [employer]-  N/A 2 Implanted   GRAFT VSCLR L30CM D10MM THRCC STRGHT DBLE VLR PASS SWNG TUBE - [id number] Vascular grafts GRAFT VSCLR L30CM D10MM THRCC STRGHT DBLE VLR PASS SWNG TUBE  [employer]-WD [id number] N/A 1 Implanted         Drains:   Chest Tube Fourth intercostal space 1 (Active)       Chest Tube 2 (Active)       NG/OG/NJ/NE Tube Orogastric Center mouth (Active)   Surrounding Skin Clean, dry & intact 05/04/25 0400   Securement device Tape 05/04/25 0400   Status Suction-low intermittent 05/04/25 0400   Placement Verified X-Ray (Initial);Respiratory Status;External Catheter Length 05/04/25 0400   NG/OG/NJ/NE External Measurement (cm) 64 cm 05/04/25 0400       Urinary Catheter 05/04/25 Booth (Active)   Output (mL) 15 mL 05/04/25 0600       Findings:  Infection Present At Time Of Surgery (PATOS) (choose all levels that have infection present):  No infection present  Other Findings: code blue on arrival to OR, chest compressions/emergency thoracotomy and sternotomy; relief of tamponade with 800cc blood retrieved; Impella placed via dacron 10mm graft on the ascending aorta. PERRY and intra-op CXR showed no undrained effusions at the end

## 2025-05-04 NOTE — CONSENT
Informed Consent for Blood Component Transfusion Note    I have discussed with the patient the rationale for blood component transfusion; its benefits in treating or preventing fatigue, organ damage, or death; and its risk which includes mild transfusion reactions, rare risk of blood borne infection, or more serious but rare reactions. I have discussed the alternatives to transfusion, including the risk and consequences of not receiving transfusion. The patient had an opportunity to ask questions and had agreed to proceed with transfusion of blood components.    Electronically signed by Crow Soriano MD on 5/4/25 at 2:55 PM CDT

## 2025-05-04 NOTE — PROGRESS NOTES
0153- patient intubated with a 7.5 et tube @ 23 cm fadia. Positive CO2 color change. Equal bilateral breath sounds.

## 2025-05-04 NOTE — PROGRESS NOTES
Nephrology ([facility]) Progess Note      Patient:  Casimiro Vinson  YOB: 1965  Date of Service: 5/4/2025  MRN: [id number]   Acct: [id number]   Primary Care Physician: Abdi Lozano DO  Advance Directive: Full Code  Admit Date: 5/3/2025       Hospital Day: 1  Referring Provider: Corey Kelley MD    Patient independently seen and examined, Chart, Consults, Notes, Operative notes, Labs, Cardiology, and Radiology studies reviewed as available.    Chief complaint: Increasing shortness of breath.    Subjective:  Casimiro Vinson is a 60 y.o. male for whom we were consulted for evaluation and treatment of end-stage renal disease on maintenance dialysis Monday Wednesday Friday.  Last hemodialysis treatment was on Wednesday.  He was not feeling good to go for dialysis on Friday..  He was supposed to get dialysis today as he was getting ready to take a shower but has severe episode of increasing shortness of breath.  He was decided to come to the emergency room as his son drove him to ER at [facility].  Patient was diagnosed with pulmonary edema at that point he was also complaining of chest pain.  He was recently admitted to [facility], underwent single-vessel bypass surgery and had 2 cardiac stents in the past.  Patient was also complaining of lightheadedness.  His chest x-ray consistent with pulmonary edema.    This morning about 2 AM DIANA GASPAR was called as he was found to be unresponsive hypotensive and was complaining of shortness of breath.  He was successfully resuscitated/intubated.  Vasopressors were initiated and transferred to ICU.  Bedside 2D echocardiogram consistent with large pericardial effusion.  This morning he was taken to the operating room by Dr. Soriano for pericardial window.  He underwent open heart surgery and evacuation of large hematoma from his pericardial sac.  Patient was placed on Impella and 4 pressors.  He was moved to        Intake/Output Summary (Last 24 hours) at 5/4/2025 1059  Last data filed at 5/4/2025 1002  Gross per 24 hour   Intake 4286 ml   Output 3015 ml   Net 1271 ml     General: awake/alert   HEENT: Normocephalic atraumatic head  Neck: Supple with no JVD or carotid bruits.  Chest:  decreased breath sounds noted-bilaterally at the bases  CVS: regular rate and rhythm  Abdominal: soft, nontender, normal bowel sounds  Extremities: no cyanosis or edema  Skin: warm and dry without rash      Labs:  BMP:   Recent Labs     05/04/25  0213 05/04/25  0323 05/04/25  0522 05/04/25  0734 05/04/25  0837 05/04/25  0929 05/04/25  1022 05/04/25  1028     --  140  --   --   --  141  --    K 5.3*   < > 6.0*  --   --   --  5.1 5.0   CL 89*  --  91*  --   --   --  96*  --    CO2 15*  --  14*   < > 16* 16* 16*  --    BUN 29*  --  32*  --   --   --  31*  --    CREATININE 7.4*  --  7.8*   < > 7.3* 6.8* 7.3*  --    CALCIUM 8.5*  --  8.9  --   --   --  7.8*  --     < > = values in this interval not displayed.     CBC:   Recent Labs     05/04/25 0213 05/04/25 0522 05/04/25  0734 05/04/25  0837 05/04/25  0929 05/04/25  1022   WBC 25.7* 24.1*  --   --   --  16.5*   HGB 7.0* 7.3*   < > 8.3* 7.7* 8.8*   HCT 23.1* 24.6*  --   --   --  27.6*   .5* 106.5*  --   --   --  97.5*    237  --   --   --  88*    < > = values in this interval not displayed.     LIVER PROFILE:   Recent Labs     05/03/25  0638 05/04/25  0213 05/04/25  0522   AST 25 2,735* 4,132*   ALT 9* 1,413* 2,003*   BILITOT 0.4 0.7 0.9   ALKPHOS 96 106 107     PT/INR:   Recent Labs     05/04/25 0522 05/04/25  1022   PROTIME 31.2* 27.8*   INR 3.08* 2.65*     APTT:   Recent Labs     05/04/25  1022   APTT 54.9*     BNP:  No results for input(s): \"BNP\" in the last 72 hours.  Ionized Calcium:Invalid input(s): \"IONCA\"  Magnesium:  Recent Labs     05/04/25  1022   MG 2.8*     Phosphorus:No results for input(s): \"PHOS\" in the last 72 hours.  HgbA1C:   Recent Labs

## 2025-05-04 NOTE — PROGRESS NOTES
Physical Therapy  Will dc current PT order due to decline in medical status. Please re order when appropriate.   Electronically signed by Kathya Parada PT on 5/4/2025 at 8:16 AM

## 2025-05-04 NOTE — CONSULTS
Department of Cardiothoracic Surgery    CHIEF COMPLAINT:    Chief Complaint   Patient presents with    Post-op Problem     Recent surgery at [facility], dyspnea on exertion, weakness, pain to LT side surgical site with movement, nausea. Dialysis pt MWF-last session Wednesday          HISTORY OF PRESENT ILLNESS:      60 y.o. male with past medical history of CAD s/p recent robotic single vessel bypass at [place] on 25. He has been having hallucinations, an episode of syncope and failure to thrive since discharge. PMH of ESRD on HD MWF, Type 2 diabetes, hypertension.     The patient was admitted last night and scheduled for inpatient dialysis and further w/u. He developed a code blue with asystolic arrest 4 hours ago (2am). ROSC achieved after intubation and code drugs. He stabilized on arrival to the ICU but then subsequently deteriorated. Currently SBP 70's on 5 different vasoactive drips. TTE obtained at the bedside just now that showed pericardial effusion and a-line tracing shows pulsus paradoxicus. The patient was then scheduled for urgent surgical intervention.         Social History     Socioeconomic History    Marital status:      Spouse name: Not on file    Number of children: Not on file    Years of education: Not on file    Highest education level: Not on file   Occupational History    Not on file   Tobacco Use    Smoking status: Former     Current packs/day: 0.00     Average packs/day: 0.5 packs/day for 14.0 years (7.0 ttl pk-yrs)     Types: Cigarettes     Start date: 1984     Quit date: 1998     Years since quittin.3     Passive exposure: Past    Smokeless tobacco: Current     Types: Chew, Snuff   Vaping Use    Vaping status: Not on file   Substance and Sexual Activity    Alcohol use: Never    Drug use: Never    Sexual activity: Not on file   Other Topics Concern    Not on file   Social History Narrative    Not on file     Social Drivers of Health     Financial  IntraVENous, PRN, Sam Kelley, APRN - CNP    0.9 % sodium chloride infusion, , IntraVENous, PRN, Sam Kelley, APRN - CNP    heparin (porcine) injection 5,000 Units, 5,000 Units, SubCUTAneous, 3 times per day, Sam Kelley, APRN - CNP, 5,000 Units at 05/03/25 2040    ondansetron (ZOFRAN-ODT) disintegrating tablet 4 mg, 4 mg, Oral, Q8H PRN **OR** ondansetron (ZOFRAN) injection 4 mg, 4 mg, IntraVENous, Q6H PRN, Sam Kelley, APRN - CNP    polyethylene glycol (GLYCOLAX) packet 17 g, 17 g, Oral, Daily PRN, Sam Kelley, APRN - CNP    acetaminophen (TYLENOL) tablet 650 mg, 650 mg, Oral, Q6H PRN, 650 mg at 05/04/25 0055 **OR** acetaminophen (TYLENOL) suppository 650 mg, 650 mg, Rectal, Q6H PRN, Sam Kelley APRN - CNP    epoetin arianna-epbx (RETACRIT) injection 10,000 Units, 10,000 Units, SubCUTAneous, Once per day on Tuesday Thursday Saturday, Juan R March MD, 10,000 Units at 05/03/25 1842    aspirin chewable tablet 81 mg, 81 mg, Oral, Daily, Sam Kelley APRN - CNP    amLODIPine (NORVASC) tablet 10 mg, 10 mg, Oral, Daily, Sam Kelley, APRN - CNP, 10 mg at 05/03/25 1529    atorvastatin (LIPITOR) tablet 40 mg, 40 mg, Oral, Nightly, Sam Kelley, APRN - CNP, 40 mg at 05/03/25 1950    clopidogrel (PLAVIX) tablet 75 mg, 75 mg, Oral, Daily, Theodore Kelleyaben B, APRN - CNP, 75 mg at 05/03/25 1529    sacubitril-valsartan (ENTRESTO) 24-26 MG per tablet 1 tablet, 1 tablet, Oral, BID, Sam Kelley APRN - CNP, 1 tablet at 05/03/25 1950    famotidine (PEPCID) tablet 20 mg, 20 mg, Oral, Daily PRN, Sam Kelley APRN - CNP    ferrous sulfate (IRON 325) tablet 650 mg, 650 mg, Oral, Daily with breakfast, Sam Kelley APRN - CNP    furosemide (LASIX) tablet 40 mg, 40 mg, Oral, Daily, Sam Kelley APRN - CNP, 40 mg at 05/03/25 1529    montelukast (SINGULAIR) tablet 10 mg, 10 mg, Oral, Daily, Sam Kelley APRN - CNP, 10 mg at 05/03/25 1529    insulin glargine

## 2025-05-04 NOTE — OP NOTE
[facility]              [address] [place] [address]                            OPERATIVE REPORT      PATIENT NAME: PELON FERREIRA            : 1965  MED REC NO: [id number]                          ROOM: [id number]  ACCOUNT NO: [id number]                       ADMIT DATE: 2025  PROVIDER: Crow Soriano MD      DATE OF PROCEDURE:  2025    SURGEON:  Crow Soriano MD    PREOPERATIVE DIAGNOSIS:  Circulatory shock due to tamponade.    POSTOPERATIVE DIAGNOSIS:  Circulatory shock due to tamponade.    PROCEDURES:  1) Emergency sternotomy for relief of pericardial tamponade  2) Implantation of an Impella 5.5 left ventricular assist device via a 10 mm Dacron graft sewn onto the ascending aorta and tunneled supraclavicularly.    ASSISTANT:  Danna Hernandez.    ESTIMATED BLOOD LOSS:  500 mL (mostly old blood)    HISTORY:  The patient is a 60-year-old man who underwent a single-vessel coronary bypass grafting operation via left thoracotomy 2 weeks ago.  He presented with an episode of fatigue and syncope and then ultimately this progressed to shock and a code blue in the hospital.  A cardiac echo was obtained and showed evidence of pericardial tamponade.  The patient was taken to the OR emergently for relief of tamponade. We also discussed possible implant of an Impella 5.5 device because of a poor ejection fraction of the left ventricle noted on transthoracic echo examination.  The family understood the patient's dire prognosis, but that he was not going to survive without this intervention and he agreed to proceed under these terms.    DESCRIPTION OF PROCEDURE:  The patient was already endotracheally intubated.  He was given some fentanyl intravenous drip sedation. Afterwards, we noted flat line on the arterial line trace.  Chest compressions were performed. An initial attempt at a left thoracotomy was unsuccessful to get into the pericardial space because of the dense

## 2025-05-04 NOTE — PROGRESS NOTES
Patient arrived from OR. OR staff, Anesthesia and Impella Rep at bedside. Patient placed on mechanical ventilation. Patient on Dobutamine 2.5, Vaso 0.03, EPI 5, Jarett 300, Levo 100.  3 CT noted. All placed on -20 cm of suction without airleak. L CT 16ml, M CT 30ml, R CT 53ml output noted.  R axillary Impella placement at 34cm.   Booth output 5 ml.

## 2025-05-04 NOTE — PROGRESS NOTES
Facial swelling noted to look worse. Dr. Soriano at bedside and pulled back Introducer to see if it would help with swelling. Dr. Soriano stitched central line. Sterile dressing applied by RN. Patient tolerated without complications.

## 2025-05-04 NOTE — PROGRESS NOTES
Pharmacy Adjustment per [facility] protocol    Casimiro Vinson is a 60 y.o. male. Pharmacy has adjusted medications per [facility] protocol.    Recent Labs     05/04/25  1022 05/04/25  1345   BUN 31* 30*       Recent Labs     05/04/25  1022 05/04/25  1345   CREATININE 7.3* 6.8*       Estimated Creatinine Clearance: 14 mL/min (A) (based on SCr of 6.8 mg/dL (H)).    Height:   Ht Readings from Last 1 Encounters:   05/03/25 1.778 m (5' 10\")     Weight:  Wt Readings from Last 1 Encounters:   05/03/25 97.8 kg (215 lb 9.8 oz)    BMI:  BMI Readings from Last 1 Encounters:   05/03/25 30.94 kg/m²         Plan: Adjust the following medications based on [facility] protocol:           Cefazolin to 1 g IV every 24 hours x 1 more dose post op, to be administered on 05/05.    Electronically signed by Darling Morales RPH on 5/4/2025 at 5:20 PM

## 2025-05-04 NOTE — PLAN OF CARE
Problem: Chronic Conditions and Co-morbidities  Goal: Patient's chronic conditions and co-morbidity symptoms are monitored and maintained or improved  5/3/2025 2205 by Deborah Cha RN  Outcome: Progressing  5/3/2025 1413 by Genie Alvarez RN  Outcome: Progressing     Problem: Discharge Planning  Goal: Discharge to home or other facility with appropriate resources  5/3/2025 2205 by Deborah Cha RN  Outcome: Progressing  5/3/2025 1413 by Genie Alvarez RN  Outcome: Progressing     Problem: ABCDS Injury Assessment  Goal: Absence of physical injury  5/3/2025 2205 by Deborah Cha RN  Outcome: Progressing  5/3/2025 1413 by Genie Alvarez RN  Outcome: Progressing     Problem: Safety - Adult  Goal: Free from fall injury  5/3/2025 2205 by Deborah Cha RN  Outcome: Progressing  5/3/2025 1413 by Genie Alvarez RN  Outcome: Progressing

## 2025-05-04 NOTE — PROGRESS NOTES
Blood bank called. Stated they are completely out of platelets. Blood bank is hoping to have more around midnight tonight. MD aware.

## 2025-05-04 NOTE — ED PROVIDER NOTES
Central Line    Date/Time: 5/4/2025 2:45 AM    Performed by: Em Morales MD  Authorized by: Shannon Nichole MD    Consent:     Consent obtained:  Emergent situation  Universal protocol:     Test results available: yes      Immediately prior to procedure, a time out was called: yes      Patient identity confirmed:  Arm band  Pre-procedure details:     Indication(s): central venous access and insufficient peripheral access      Hand hygiene: Hand hygiene performed prior to insertion      Sterile barrier technique: All elements of maximal sterile technique followed      Skin preparation:  Chlorhexidine    Skin preparation agent: Skin preparation agent completely dried prior to procedure    Sedation:     Sedation type:  None  Anesthesia:     Anesthesia method:  Local infiltration    Local anesthetic:  Lidocaine 1% w/o epi  Procedure details:     Location:  R femoral    Site selection rationale:  Easily accessed, recently coded and hypotensive, not in the way should CPR restart    Patient position:  Supine    Procedural supplies:  Triple lumen    Catheter size:  7 Fr    Landmarks identified: yes      Ultrasound guidance: yes      Ultrasound guidance timing: real time      Sterile ultrasound techniques: Sterile gel and sterile probe covers were used      Number of attempts:  1    Successful placement: yes    Post-procedure details:     Post-procedure:  Dressing applied and line sutured    Assessment:  Blood return through all ports    Procedure completion:  Tolerated          Em Morales MD  05/04/25 0247

## 2025-05-04 NOTE — PROGRESS NOTES
OR staff at bedside to take patient to OR. Consents signed and placed on soft chart. Patient on portable monitor. Anesthesia at bedside bagging patient at 15L 100%.

## 2025-05-04 NOTE — PROGRESS NOTES
Pharmacy Adjustment per [facility] protocol    Casimiro Vinson is a 60 y.o. male. Pharmacy has adjusted medications per [facility] protocol.    Recent Labs     05/04/25  1022 05/04/25  1345   BUN 31* 30*       Recent Labs     05/04/25  1022 05/04/25  1345   CREATININE 7.3* 6.8*       Estimated Creatinine Clearance: 14 mL/min (A) (based on SCr of 6.8 mg/dL (H)).    Height:   Ht Readings from Last 1 Encounters:   05/03/25 1.778 m (5' 10\")     Weight:  Wt Readings from Last 1 Encounters:   05/03/25 97.8 kg (215 lb 9.8 oz)    BMI:  BMI Readings from Last 1 Encounters:   05/03/25 30.94 kg/m²         Plan: Adjust the following medications based on [facility] protocol:           Cefazolin 2 g IV every 8 hours post op to Cefazolin 2 g IV every 12 hours post op    Electronically signed by Darling Morales RPH on 5/4/2025 at 5:15 PM

## 2025-05-04 NOTE — PROGRESS NOTES
Pt arrived to ICU bet 142 at 0158. Pt arrived while receiving compressions enroute to the room.    0158 - 1 Epi given    0159 - 1 Bicarb given    0200 - pulse check. Pt had a pulse. Hospitalist ordered levo     0203 - Levo gtt started    0206 - HR 48 and pulse was dopplerable . Many IV attempts were made.     0216 - No pulse found. CPR resumed 1 Epi and 1 Bicarb given    0218 - pulse check. PEA.     0219 - 1 Atropine given    0220 - Pulse check. Stopped CPR. IO placed in Left shin    0230 - ER MD came to place CVC. CVC placed in R groin.     Electronically signed by Crow Lozano RN on 5/4/2025 at 7:16 AM

## 2025-05-04 NOTE — PROGRESS NOTES
Cardiac procedure note  Procedure: Insertion of Impella 5.5 via surgically implanted conduit on the aorta  Location: Operating room #5  Anesthesia: General  Complications: None    Procedure:  This procedure was performed in the operating room in conjunction with performance of drainage of cardiac tamponade by Dr. Soriano occurring in the setting of recent single-vessel bypass procedure at [facility] on 4/17/2025 also history of end-stage renal disease on dialysis.  Developed profound shock in the last 24 hours requiring maxed out multiple vasopressors.  Hemodynamics improved after relief of the tamponade but continued to be requiring high dose vasopressors with history of cardiomyopathy.  Dr. Soriano elected to request placement of an Impella device and subsequently proceeded with placement of a surgical conduit placed on the aorta.  After this was completed I inserted a JR4 diagnostic catheter over a wire and this was advanced to the aortic valve and utilizing the wire across the aortic valve readily and advanced the catheter.  The wire was subsequently withdrawn and removed and we then inserted the Impella wire to the apex and the JR4 catheter was withdrawn and removed.  The Impella 5.5 catheter had now been prepared and was advanced over the wire across aortic valve under fluoroscopic guidance and positioned accordingly.  Position was confirmed also on transesophageal echocardiography.  The wire was withdrawn and removed.  Support via the Impella device was progressively uptitrated with achievement of at least 3.5 L/min cardiac output tolerated well.   Chief Complaint   Patient presents with    Pharyngitis     started 3 days ago    Drainage    Cough       HPI:  Patient presents today for sore throat for the last 3 days with sinus drainage. No fever. Cough just started which is nonproductive. Current Outpatient Medications:     cefUROXime (CEFTIN) 250 MG tablet, Take 1 tablet by mouth 2 times daily for 10 days, Disp: 20 tablet, Rfl: 0    methylPREDNISolone (MEDROL DOSEPACK) 4 MG tablet, Take by mouth., Disp: 1 kit, Rfl: 0    traMADol (ULTRAM) 50 MG tablet, Take 1 tablet by mouth 2 times daily for 90 days. , Disp: 60 tablet, Rfl: 2    ibuprofen (ADVIL;MOTRIN) 800 MG tablet, Take 1 tablet by mouth daily as needed for Pain, Disp: 60 tablet, Rfl: 1    glipiZIDE (GLUCOTROL) 5 MG tablet, Take 2 tablets by mouth 2 times daily, Disp: , Rfl:     metFORMIN (GLUCOPHAGE) 500 MG tablet, Take 2 tablets by mouth 2 times daily, Disp: , Rfl:     vitamin E 600 units capsule, Take 600 Units by mouth daily, Disp: , Rfl:     vitamin C (ASCORBIC ACID) 500 MG tablet, Take 500 mg by mouth daily, Disp: , Rfl:     Multiple Vitamins-Minerals (MULTIVITAMIN PO), Take by mouth, Disp: , Rfl:     Biotin 5000 MCG TABS, Take by mouth, Disp: , Rfl:     Omega-3 Fatty Acids (OMEGA 3 PO), Take by mouth, Disp: , Rfl:     pioglitazone (ACTOS) 45 MG tablet, Take 45 mg by mouth daily. , Disp: , Rfl:     enalapril (VASOTEC) 20 MG tablet, Take 20 mg by mouth 2 times daily. , Disp: , Rfl:        No Known Allergies      Review of Systems  Review of Systems   Constitutional: Negative for chills, fatigue and fever. HENT: Positive for congestion, sinus pressure and sinus pain. Negative for ear discharge, ear pain and sore throat. Eyes: Negative. Respiratory: Positive for cough (Nonproductive). Negative for apnea, choking, chest tightness, shortness of breath, wheezing and stridor. Cardiovascular: Negative. Gastrointestinal: Negative.           VS:  /62   Pulse 86   Temp

## 2025-05-05 ENCOUNTER — APPOINTMENT (OUTPATIENT)
Age: 60
DRG: 001 | End: 2025-05-05
Attending: INTERNAL MEDICINE
Payer: MEDICARE

## 2025-05-05 ENCOUNTER — APPOINTMENT (OUTPATIENT)
Dept: GENERAL RADIOLOGY | Age: 60
DRG: 001 | End: 2025-05-05
Payer: MEDICARE

## 2025-05-05 LAB
ABO/RH: NORMAL
ALBUMIN SERPL-MCNC: 3.1 G/DL (ref 3.5–5.2)
ALBUMIN SERPL-MCNC: 3.4 G/DL (ref 3.5–5.2)
ALBUMIN SERPL-MCNC: 3.5 G/DL (ref 3.5–5.2)
ALLENS TEST: ABNORMAL
ALP SERPL-CCNC: 144 U/L (ref 40–129)
ALP SERPL-CCNC: 178 U/L (ref 40–129)
ALP SERPL-CCNC: 190 U/L (ref 40–129)
ALT SERPL-CCNC: 1238 U/L (ref 10–50)
ALT SERPL-CCNC: 2135 U/L (ref 10–50)
ALT SERPL-CCNC: 907 U/L (ref 10–50)
ANION GAP SERPL CALCULATED.3IONS-SCNC: 17 MMOL/L (ref 8–16)
ANTIBODY SCREEN: NORMAL
AST SERPL-CCNC: 5024 U/L (ref 10–50)
AST SERPL-CCNC: 6315 U/L (ref 10–50)
AST SERPL-CCNC: >7000 U/L (ref 10–50)
BASE EXCESS ARTERIAL: -4 MMOL/L (ref -2–2)
BASE EXCESS ARTERIAL: -4.5 MMOL/L (ref -2–2)
BASE EXCESS VENOUS: -5 MMOL/L
BASOPHILS # BLD: 0 K/UL (ref 0–0.2)
BASOPHILS # BLD: 0.1 K/UL (ref 0–0.2)
BASOPHILS NFR BLD: 0.3 % (ref 0–1)
BASOPHILS NFR BLD: 0.6 % (ref 0–1)
BILIRUB SERPL-MCNC: 3.1 MG/DL (ref 0.2–1.2)
BILIRUB SERPL-MCNC: 5.5 MG/DL (ref 0.2–1.2)
BILIRUB SERPL-MCNC: 5.8 MG/DL (ref 0.2–1.2)
BUN SERPL-MCNC: 29 MG/DL (ref 8–23)
BUN SERPL-MCNC: 33 MG/DL (ref 8–23)
BUN SERPL-MCNC: 35 MG/DL (ref 8–23)
CALCIUM SERPL-MCNC: 7.2 MG/DL (ref 8.8–10.2)
CALCIUM SERPL-MCNC: 7.9 MG/DL (ref 8.8–10.2)
CALCIUM SERPL-MCNC: 7.9 MG/DL (ref 8.8–10.2)
CARBOXYHEMOGLOBIN ARTERIAL: 1.7 % (ref 0–5)
CARBOXYHEMOGLOBIN ARTERIAL: 1.7 % (ref 0–5)
CARBOXYHEMOGLOBIN: 1.9 %
CHLORIDE SERPL-SCNC: 102 MMOL/L (ref 98–107)
CHLORIDE SERPL-SCNC: 102 MMOL/L (ref 98–107)
CHLORIDE SERPL-SCNC: 103 MMOL/L (ref 98–107)
CO2 SERPL-SCNC: 20 MMOL/L (ref 22–29)
CO2 SERPL-SCNC: 20 MMOL/L (ref 22–29)
CO2 SERPL-SCNC: 21 MMOL/L (ref 22–29)
CREAT SERPL-MCNC: 5.7 MG/DL (ref 0.7–1.2)
CREAT SERPL-MCNC: 6.2 MG/DL (ref 0.7–1.2)
CREAT SERPL-MCNC: 7.5 MG/DL (ref 0.7–1.2)
ECHO BSA: 2.19 M2
ECHO RA AREA 4C: 14.2 CM2
ECHO RA END SYSTOLIC VOLUME APICAL 4 CHAMBER INDEX BSA: 17 ML/M2
ECHO RA MAJOR AXIS INDEX: 1.91 CM/M2
ECHO RA MAJOR AXIS: 4.1 CM
ECHO RA MINOR AXIS INDEX: 1.72 CM/M2
ECHO RA MINOR AXIS: 3.7 CM
ECHO RA VOLUME: 37 ML
ECHO RV BASAL DIMENSION: 3.9 CM
ECHO RV LONGITUDINAL DIMENSION: 8.7 CM
ECHO RV MID DIMENSION: 4 CM
ECHO RV TAPSE: 1.7 CM (ref 1.7–?)
EKG P AXIS: 7 DEGREES
EKG P-R INTERVAL: 146 MS
EKG Q-T INTERVAL: 344 MS
EKG QRS DURATION: 64 MS
EKG QTC CALCULATION (BAZETT): 401 MS
EKG T AXIS: 72 DEGREES
EOSINOPHIL # BLD: 0 K/UL (ref 0–0.6)
EOSINOPHIL # BLD: 0 K/UL (ref 0–0.6)
EOSINOPHIL NFR BLD: 0 % (ref 0–5)
EOSINOPHIL NFR BLD: 0.3 % (ref 0–5)
ERYTHROCYTE [DISTWIDTH] IN BLOOD BY AUTOMATED COUNT: 16.6 % (ref 11.5–14.5)
ERYTHROCYTE [DISTWIDTH] IN BLOOD BY AUTOMATED COUNT: 16.8 % (ref 11.5–14.5)
ERYTHROCYTE [DISTWIDTH] IN BLOOD BY AUTOMATED COUNT: 16.8 % (ref 11.5–14.5)
FIO2: 100 %
FIO2: 100 %
GLUCOSE BLD-MCNC: 120 MG/DL (ref 70–99)
GLUCOSE BLD-MCNC: 196 MG/DL (ref 70–99)
GLUCOSE BLD-MCNC: 287 MG/DL (ref 70–99)
GLUCOSE BLD-MCNC: 293 MG/DL (ref 70–99)
GLUCOSE SERPL-MCNC: 194 MG/DL (ref 70–99)
GLUCOSE SERPL-MCNC: 230 MG/DL (ref 70–99)
GLUCOSE SERPL-MCNC: 262 MG/DL (ref 70–99)
HCO3 ARTERIAL: 22.1 MMOL/L (ref 22–26)
HCO3 ARTERIAL: 23.7 MMOL/L (ref 22–26)
HCO3 VENOUS: 23 MMOL/L (ref 23–29)
HCT VFR BLD AUTO: 28.8 % (ref 42–52)
HCT VFR BLD AUTO: 29.6 % (ref 42–52)
HCT VFR BLD AUTO: 33.5 % (ref 42–52)
HEMOGLOBIN, ART, EXTENDED: 11.2 G/DL (ref 14–18)
HEMOGLOBIN, ART, EXTENDED: 11.6 G/DL (ref 14–18)
HGB BLD-MCNC: 10.2 G/DL (ref 14–18)
HGB BLD-MCNC: 11.3 G/DL (ref 14–18)
HGB BLD-MCNC: 9.8 G/DL (ref 14–18)
IMM GRANULOCYTES # BLD: 0.1 K/UL
IMM GRANULOCYTES # BLD: 0.1 K/UL
INR PPP: 3.61 (ref 0.88–1.18)
LACTATE BLDV-SCNC: 3.3 MMOL/L (ref 0.5–1.9)
LACTATE BLDV-SCNC: 3.5 MMOL/L (ref 0.5–1.9)
LACTATE BLDV-SCNC: 3.7 MMOL/L (ref 0.5–1.9)
LACTATE BLDV-SCNC: 4.1 MMOL/L (ref 0.5–1.9)
LACTATE BLDV-SCNC: 5.7 MMOL/L (ref 0.5–1.9)
LYMPHOCYTES # BLD: 0.8 K/UL (ref 1.1–4.5)
LYMPHOCYTES # BLD: 0.9 K/UL (ref 1.1–4.5)
LYMPHOCYTES NFR BLD: 10.4 % (ref 20–40)
LYMPHOCYTES NFR BLD: 7 % (ref 20–40)
MAGNESIUM SERPL-MCNC: 2.1 MG/DL (ref 1.6–2.4)
MCH RBC QN AUTO: 30.2 PG (ref 27–31)
MCH RBC QN AUTO: 30.6 PG (ref 27–31)
MCH RBC QN AUTO: 30.6 PG (ref 27–31)
MCHC RBC AUTO-ENTMCNC: 33.7 G/DL (ref 33–37)
MCHC RBC AUTO-ENTMCNC: 34 G/DL (ref 33–37)
MCHC RBC AUTO-ENTMCNC: 34.5 G/DL (ref 33–37)
MCV RBC AUTO: 88.9 FL (ref 80–94)
MCV RBC AUTO: 88.9 FL (ref 80–94)
MCV RBC AUTO: 90.8 FL (ref 80–94)
MECHANICAL RATE IN BPM: 20
MECHANICAL RATE IN BPM: 20
METHEMOGLOBIN ARTERIAL: 1.3 %
METHEMOGLOBIN ARTERIAL: 1.3 %
METHEMOGLOBIN VENOUS: 1.3 %
MODE: ABNORMAL
MODE: ABNORMAL
MONOCYTES # BLD: 0.3 K/UL (ref 0–0.9)
MONOCYTES # BLD: 0.9 K/UL (ref 0–0.9)
MONOCYTES NFR BLD: 3.4 % (ref 0–10)
MONOCYTES NFR BLD: 7.4 % (ref 0–10)
NEUTROPHILS # BLD: 7.6 K/UL (ref 1.5–7.5)
NEUTROPHILS # BLD: 9.8 K/UL (ref 1.5–7.5)
NEUTS SEG NFR BLD: 84.1 % (ref 50–65)
NEUTS SEG NFR BLD: 84.2 % (ref 50–65)
O2 CONTENT ARTERIAL: 14.7 ML/DL
O2 CONTENT ARTERIAL: 14.8 ML/DL
O2 CONTENT, VEN: 11 ML/DL
O2 SAT, ARTERIAL: 90.2 %
O2 SAT, ARTERIAL: 93.4 %
O2 SAT, VEN: 67 %
O2 THERAPY: ABNORMAL
PCO2 ARTERIAL: 53 MMHG (ref 35–45)
PCO2 ARTERIAL: 54 MMHG (ref 35–45)
PCO2 VENOUS: 62 MMHG (ref 40–50)
PERFORMED ON: ABNORMAL
PH ARTERIAL: 7.25 (ref 7.35–7.45)
PH ARTERIAL: 7.25 (ref 7.35–7.45)
PH VENOUS: 7.2 (ref 7.35–7.45)
PLATELET # BLD AUTO: 101 K/UL (ref 130–400)
PLATELET # BLD AUTO: 46 K/UL (ref 130–400)
PLATELET # BLD AUTO: 47 K/UL (ref 130–400)
PMV BLD AUTO: 10.4 FL (ref 9.4–12.4)
PMV BLD AUTO: 10.6 FL (ref 9.4–12.4)
PMV BLD AUTO: 11.4 FL (ref 9.4–12.4)
PO2 ARTERIAL: 63 MMHG (ref 80–100)
PO2 ARTERIAL: 93 MMHG (ref 80–100)
PO2 VENOUS: 52 MMHG
POSITIVE END EXP PRESS: 5
POTASSIUM BLD-SCNC: 5 MMOL/L
POTASSIUM BLD-SCNC: 6.2 MMOL/L
POTASSIUM SERPL-SCNC: 4.2 MMOL/L (ref 3.5–5.1)
POTASSIUM SERPL-SCNC: 4.4 MMOL/L (ref 3.5–5)
POTASSIUM SERPL-SCNC: 6.3 MMOL/L (ref 3.5–5.1)
PROT SERPL-MCNC: 4.8 G/DL (ref 6.4–8.3)
PROT SERPL-MCNC: 4.8 G/DL (ref 6.4–8.3)
PROT SERPL-MCNC: 5 G/DL (ref 6.4–8.3)
PROTHROMBIN TIME: 35.2 SEC (ref 12–14.6)
RBC # BLD AUTO: 3.24 M/UL (ref 4.7–6.1)
RBC # BLD AUTO: 3.33 M/UL (ref 4.7–6.1)
RBC # BLD AUTO: 3.69 M/UL (ref 4.7–6.1)
SAMPLE SOURCE: ABNORMAL
SAMPLE SOURCE: ABNORMAL
SODIUM SERPL-SCNC: 139 MMOL/L (ref 136–145)
SODIUM SERPL-SCNC: 140 MMOL/L (ref 136–145)
SODIUM SERPL-SCNC: 140 MMOL/L (ref 136–145)
VT MECHANICAL: 500 %
VT MECHANICAL: 500 %
WBC # BLD AUTO: 11.6 K/UL (ref 4.8–10.8)
WBC # BLD AUTO: 8 K/UL (ref 4.8–10.8)
WBC # BLD AUTO: 9 K/UL (ref 4.8–10.8)

## 2025-05-05 PROCEDURE — 2580000003 HC RX 258: Performed by: SURGERY

## 2025-05-05 PROCEDURE — 94003 VENT MGMT INPAT SUBQ DAY: CPT

## 2025-05-05 PROCEDURE — 2700000000 HC OXYGEN THERAPY PER DAY

## 2025-05-05 PROCEDURE — 36592 COLLECT BLOOD FROM PICC: CPT

## 2025-05-05 PROCEDURE — 83735 ASSAY OF MAGNESIUM: CPT

## 2025-05-05 PROCEDURE — 94760 N-INVAS EAR/PLS OXIMETRY 1: CPT

## 2025-05-05 PROCEDURE — 8090000000 HC CONTINUOUS VENOVENOUS HEMOFIL

## 2025-05-05 PROCEDURE — 90945 DIALYSIS ONE EVALUATION: CPT

## 2025-05-05 PROCEDURE — 6360000002 HC RX W HCPCS: Performed by: SURGERY

## 2025-05-05 PROCEDURE — 6360000002 HC RX W HCPCS: Performed by: INTERNAL MEDICINE

## 2025-05-05 PROCEDURE — 80053 COMPREHEN METABOLIC PANEL: CPT

## 2025-05-05 PROCEDURE — 82800 BLOOD PH: CPT

## 2025-05-05 PROCEDURE — 36415 COLL VENOUS BLD VENIPUNCTURE: CPT

## 2025-05-05 PROCEDURE — 71045 X-RAY EXAM CHEST 1 VIEW: CPT

## 2025-05-05 PROCEDURE — 87040 BLOOD CULTURE FOR BACTERIA: CPT

## 2025-05-05 PROCEDURE — 6370000000 HC RX 637 (ALT 250 FOR IP): Performed by: SURGERY

## 2025-05-05 PROCEDURE — 85027 COMPLETE CBC AUTOMATED: CPT

## 2025-05-05 PROCEDURE — 2500000003 HC RX 250 WO HCPCS: Performed by: SURGERY

## 2025-05-05 PROCEDURE — P9047 ALBUMIN (HUMAN), 25%, 50ML: HCPCS | Performed by: SURGERY

## 2025-05-05 PROCEDURE — 93308 TTE F-UP OR LMTD: CPT

## 2025-05-05 PROCEDURE — 37799 UNLISTED PX VASCULAR SURGERY: CPT

## 2025-05-05 PROCEDURE — 93308 TTE F-UP OR LMTD: CPT | Performed by: INTERNAL MEDICINE

## 2025-05-05 PROCEDURE — 85025 COMPLETE CBC W/AUTO DIFF WBC: CPT

## 2025-05-05 PROCEDURE — 82803 BLOOD GASES ANY COMBINATION: CPT

## 2025-05-05 PROCEDURE — 93010 ELECTROCARDIOGRAM REPORT: CPT | Performed by: INTERNAL MEDICINE

## 2025-05-05 PROCEDURE — 99024 POSTOP FOLLOW-UP VISIT: CPT | Performed by: SURGERY

## 2025-05-05 PROCEDURE — 85610 PROTHROMBIN TIME: CPT

## 2025-05-05 PROCEDURE — 99223 1ST HOSP IP/OBS HIGH 75: CPT | Performed by: PSYCHIATRY & NEUROLOGY

## 2025-05-05 PROCEDURE — 82962 GLUCOSE BLOOD TEST: CPT

## 2025-05-05 PROCEDURE — 2000000000 HC ICU R&B

## 2025-05-05 PROCEDURE — 83605 ASSAY OF LACTIC ACID: CPT

## 2025-05-05 PROCEDURE — 99291 CRITICAL CARE FIRST HOUR: CPT | Performed by: INTERNAL MEDICINE

## 2025-05-05 PROCEDURE — 2580000003 HC RX 258: Performed by: INTERNAL MEDICINE

## 2025-05-05 RX ORDER — CARBOXYMETHYLCELLULOSE SODIUM 5 MG/ML
2 SOLUTION/ DROPS OPHTHALMIC 2 TIMES DAILY
Status: DISCONTINUED | OUTPATIENT
Start: 2025-05-05 | End: 2025-05-05

## 2025-05-05 RX ORDER — POLYVINYL ALCOHOL 14 MG/ML
2 SOLUTION/ DROPS OPHTHALMIC 2 TIMES DAILY
Status: DISCONTINUED | OUTPATIENT
Start: 2025-05-05 | End: 2025-05-08 | Stop reason: HOSPADM

## 2025-05-05 RX ORDER — HEPARIN SODIUM 1000 [USP'U]/ML
INJECTION, SOLUTION INTRAVENOUS; SUBCUTANEOUS PRN
Status: DISCONTINUED | OUTPATIENT
Start: 2025-05-05 | End: 2025-05-06 | Stop reason: SDUPTHER

## 2025-05-05 RX ORDER — HEPARIN SODIUM 1000 [USP'U]/ML
INJECTION, SOLUTION INTRAVENOUS; SUBCUTANEOUS PRN
Status: DISCONTINUED | OUTPATIENT
Start: 2025-05-05 | End: 2025-05-08 | Stop reason: HOSPADM

## 2025-05-05 RX ORDER — CALCIUM CHLORIDE, MAGNESIUM CHLORIDE, DEXTROSE MONOHYDRATE, LACTIC ACID, SODIUM CHLORIDE, SODIUM BICARBONATE AND POTASSIUM CHLORIDE 5.15; 2.03; 22; 5.4; 6.46; 3.09; .157 G/L; G/L; G/L; G/L; G/L; G/L; G/L
INJECTION INTRAVENOUS CONTINUOUS
Status: DISCONTINUED | OUTPATIENT
Start: 2025-05-05 | End: 2025-05-08 | Stop reason: HOSPADM

## 2025-05-05 RX ADMIN — VASOPRESSIN 0.03 UNITS/MIN: 20 INJECTION INTRAVENOUS at 13:44

## 2025-05-05 RX ADMIN — INSULIN LISPRO 2 UNITS: 100 INJECTION, SOLUTION INTRAVENOUS; SUBCUTANEOUS at 12:22

## 2025-05-05 RX ADMIN — DEXTROSE MONOHYDRATE 25 G: 25 INJECTION, SOLUTION INTRAVENOUS at 00:03

## 2025-05-05 RX ADMIN — WATER 1000 MG: 1 INJECTION INTRAMUSCULAR; INTRAVENOUS; SUBCUTANEOUS at 02:55

## 2025-05-05 RX ADMIN — Medication 100 MCG/MIN: at 16:34

## 2025-05-05 RX ADMIN — PHENYLEPHRINE HYDROCHLORIDE 300 MCG/MIN: 50 INJECTION INTRAVENOUS at 13:06

## 2025-05-05 RX ADMIN — MIDAZOLAM HYDROCHLORIDE 2 MG: 1 INJECTION, SOLUTION INTRAMUSCULAR; INTRAVENOUS at 01:30

## 2025-05-05 RX ADMIN — INSULIN LISPRO 1 UNITS: 100 INJECTION, SOLUTION INTRAVENOUS; SUBCUTANEOUS at 21:03

## 2025-05-05 RX ADMIN — POLYVINYL ALCOHOL 2 DROP: 1.4 SOLUTION/ DROPS OPHTHALMIC at 21:23

## 2025-05-05 RX ADMIN — MIDAZOLAM HYDROCHLORIDE 2 MG: 1 INJECTION, SOLUTION INTRAMUSCULAR; INTRAVENOUS at 21:48

## 2025-05-05 RX ADMIN — ALBUMIN (HUMAN) 25 G: 0.25 INJECTION, SOLUTION INTRAVENOUS at 23:59

## 2025-05-05 RX ADMIN — AZITHROMYCIN MONOHYDRATE 500 MG: 500 INJECTION, POWDER, LYOPHILIZED, FOR SOLUTION INTRAVENOUS at 02:59

## 2025-05-05 RX ADMIN — INSULIN GLARGINE 15 UNITS: 100 INJECTION, SOLUTION SUBCUTANEOUS at 21:03

## 2025-05-05 RX ADMIN — Medication 100 MCG/MIN: at 04:34

## 2025-05-05 RX ADMIN — DEXTROSE MONOHYDRATE 25 G: 25 INJECTION, SOLUTION INTRAVENOUS at 05:42

## 2025-05-05 RX ADMIN — Medication 70 MCG/MIN: at 13:43

## 2025-05-05 RX ADMIN — ALBUMIN (HUMAN) 25 G: 0.25 INJECTION, SOLUTION INTRAVENOUS at 12:38

## 2025-05-05 RX ADMIN — MIDAZOLAM HYDROCHLORIDE 2 MG: 1 INJECTION, SOLUTION INTRAMUSCULAR; INTRAVENOUS at 18:40

## 2025-05-05 RX ADMIN — PHENYLEPHRINE HYDROCHLORIDE 300 MCG/MIN: 50 INJECTION INTRAVENOUS at 22:21

## 2025-05-05 RX ADMIN — INSULIN LISPRO 2 UNITS: 100 INJECTION, SOLUTION INTRAVENOUS; SUBCUTANEOUS at 07:44

## 2025-05-05 RX ADMIN — SODIUM CHLORIDE, PRESERVATIVE FREE 40 MG: 5 INJECTION INTRAVENOUS at 07:44

## 2025-05-05 RX ADMIN — POLYVINYL ALCOHOL 2 DROP: 1.4 SOLUTION/ DROPS OPHTHALMIC at 13:16

## 2025-05-05 RX ADMIN — ALBUMIN (HUMAN) 25 G: 0.25 INJECTION, SOLUTION INTRAVENOUS at 05:36

## 2025-05-05 RX ADMIN — PHENYLEPHRINE HYDROCHLORIDE 300 MCG/MIN: 50 INJECTION INTRAVENOUS at 16:03

## 2025-05-05 RX ADMIN — Medication 35 MCG/MIN: at 21:20

## 2025-05-05 RX ADMIN — PHENYLEPHRINE HYDROCHLORIDE 300 MCG/MIN: 50 INJECTION INTRAVENOUS at 10:16

## 2025-05-05 RX ADMIN — INSULIN HUMAN 10 UNITS: 100 INJECTION, SOLUTION PARENTERAL at 00:04

## 2025-05-05 RX ADMIN — Medication 100 MCG/MIN: at 07:15

## 2025-05-05 RX ADMIN — Medication 80 MCG/MIN: at 10:14

## 2025-05-05 RX ADMIN — ALBUMIN (HUMAN) 25 G: 0.25 INJECTION, SOLUTION INTRAVENOUS at 00:05

## 2025-05-05 RX ADMIN — PHENYLEPHRINE HYDROCHLORIDE 300 MCG/MIN: 50 INJECTION INTRAVENOUS at 19:06

## 2025-05-05 RX ADMIN — SODIUM BICARBONATE: 84 INJECTION, SOLUTION INTRAVENOUS at 07:57

## 2025-05-05 RX ADMIN — CALCIUM CHLORIDE 1000 MG: 100 INJECTION INTRAVENOUS; INTRAVENTRICULAR at 05:50

## 2025-05-05 RX ADMIN — INSULIN HUMAN 10 UNITS: 100 INJECTION, SOLUTION PARENTERAL at 05:42

## 2025-05-05 RX ADMIN — CALCIUM CHLORIDE 1000 MG: 100 INJECTION INTRAVENOUS; INTRAVENTRICULAR at 00:22

## 2025-05-05 RX ADMIN — ALBUMIN (HUMAN) 25 G: 0.25 INJECTION, SOLUTION INTRAVENOUS at 18:01

## 2025-05-05 RX ADMIN — WATER 1000 MG: 1 INJECTION INTRAMUSCULAR; INTRAVENOUS; SUBCUTANEOUS at 16:03

## 2025-05-05 ASSESSMENT — PULMONARY FUNCTION TESTS
PIF_VALUE: 28
PIF_VALUE: 35
PIF_VALUE: 31
PIF_VALUE: 24
PIF_VALUE: 25
PIF_VALUE: 32
PIF_VALUE: 31
PIF_VALUE: 24
PIF_VALUE: 29
PIF_VALUE: 28
PIF_VALUE: 28
PIF_VALUE: 23
PIF_VALUE: 22
PIF_VALUE: 29
PIF_VALUE: 31
PIF_VALUE: 30
PIF_VALUE: 27
PIF_VALUE: 24
PIF_VALUE: 34
PIF_VALUE: 26
PIF_VALUE: 28
PIF_VALUE: 25

## 2025-05-05 ASSESSMENT — PAIN SCALES - GENERAL: PAINLEVEL_OUTOF10: 0

## 2025-05-05 NOTE — PROGRESS NOTES
Hospitalist Progress Note  [facility]     Patient: Casimiro Vinson  : 1965  MRN: [id number]  Code Status: Full Code    Hospital Day: 2   Date of Service: 2025    Subjective:   Patient seen and examined.  Intubated on multiple vasopressors.    Past Medical History:   Diagnosis Date    Diabetes (HCC)     Dialysis patient     ESRD (end stage renal disease) ([facility])        Past Surgical History:   Procedure Laterality Date    CORONARY ARTERY BYPASS GRAFT      VASCULAR SURGERY  2023    Ultrasound guided cannulation of right internal jugular vein AND  Placement of right internal jugular vein tunneled dialysis catheter (Bard Equistream XK 23 cm tip to cuff). VI       History reviewed. No pertinent family history.    Social History     Socioeconomic History    Marital status:      Spouse name: Not on file    Number of children: Not on file    Years of education: Not on file    Highest education level: Not on file   Occupational History    Not on file   Tobacco Use    Smoking status: Former     Current packs/day: 0.00     Average packs/day: 0.5 packs/day for 14.0 years (7.0 ttl pk-yrs)     Types: Cigarettes     Start date: 1984     Quit date: 1998     Years since quittin.3     Passive exposure: Past    Smokeless tobacco: Current     Types: Chew, Snuff   Vaping Use    Vaping status: Not on file   Substance and Sexual Activity    Alcohol use: Never    Drug use: Never    Sexual activity: Not on file   Other Topics Concern    Not on file   Social History Narrative    Not on file     Social Drivers of Health     Financial Resource Strain: Not on file   Food Insecurity: No Food Insecurity (5/3/2025)    Hunger Vital Sign     Worried About Running Out of Food in the Last Year: Never true     Ran Out of Food in the Last Year: Never true   Transportation Needs: No Transportation Needs (5/3/2025)    PRAPARE - Transportation     Lack of Transportation (Medical): No     Lack of  IntraVENous Once PRN Crow Soriano MD        sodium bicarbonate 12.5 mEq in dextrose 5 % 500 mL infusion (FOR IMPELLA PURGE)   IntraCATHeter Continuous BoCrow milton MD   New Bag at 05/04/25 1103    norepinephrine (LEVOPHED) 16 mg in sodium chloride 0.9 % 250 mL infusion (premix)  1-100 mcg/min IntraVENous Continuous Crow Soriano MD 84.4 mL/hr at 05/05/25 0900 90 mcg/min at 05/05/25 0900    sodium bicarbonate 100 mEq in dextrose 5 % 1,000 mL infusion   IntraVENous Continuous Crow Soriano MD 50 mL/hr at 05/05/25 0900 Rate Verify at 05/05/25 0900    protamine injection 50 mg  50 mg IntraVENous Once PRN Crow Soriano MD        sodium chloride flush 0.9 % injection 5-40 mL  5-40 mL IntraVENous PRN Crow Soriano MD        0.9 % sodium chloride infusion   IntraVENous PRN Crow Soriano MD        ondansetron (ZOFRAN-ODT) disintegrating tablet 4 mg  4 mg Oral Q8H PRN Crow Soriano MD        Or    ondansetron (ZOFRAN) injection 4 mg  4 mg IntraVENous Q6H PRN Crow Soriano MD        clopidogrel (PLAVIX) tablet 75 mg  75 mg Oral Daily Crow Soriano MD        morphine (PF) injection 2 mg  2 mg IntraVENous Q2H PRN Crow Soriano MD        Or    morphine sulfate (PF) injection 4 mg  4 mg IntraVENous Q2H PRN Crow Soriano MD        meperidine (DEMEROL) injection 25 mg  25 mg IntraVENous Once PRN Crow Soriano MD        zolpidem (AMBIEN) tablet 5 mg  5 mg Oral Nightly PRN Crow Soriano MD        potassium chloride 20 mEq/50 mL IVPB (Central Line)  20 mEq IntraVENous PRN Crow Soriano MD        magnesium sulfate 2000 mg in 50 mL IVPB premix  2,000 mg IntraVENous PRN Crow Soriano MD        calcium chloride 1,000 mg in sodium chloride 0.9 % 100 mL IVPB  1,000 mg IntraVENous PRN Crow Soriano MD   Stopped at 05/05/25 0650    Or    calcium chloride 2,000 mg in sodium chloride 0.9 % 100 mL IVPB  2,000 mg IntraVENous PRN Crow Soriano MD   Stopped at 05/04/25 1240    sodium chloride 0.9 % bolus 500 mL

## 2025-05-05 NOTE — PLAN OF CARE
Problem: Chronic Conditions and Co-morbidities  Goal: Patient's chronic conditions and co-morbidity symptoms are monitored and maintained or improved  5/5/2025 0843 by Lucy Campbell RN  Outcome: Not Progressing  5/5/2025 0842 by Lucy Campbell RN  Outcome: Progressing  5/5/2025 0842 by Lucy Campbell RN  Outcome: Progressing  5/5/2025 0842 by Lucy Campbell RN  Outcome: Progressing  Flowsheets (Taken 5/5/2025 0800)  Care Plan - Patient's Chronic Conditions and Co-Morbidity Symptoms are Monitored and Maintained or Improved: Monitor and assess patient's chronic conditions and comorbid symptoms for stability, deterioration, or improvement  5/4/2025 2129 by Crow Lozano RN  Outcome: Not Progressing     Problem: Discharge Planning  Goal: Discharge to home or other facility with appropriate resources  5/5/2025 0843 by Lucy Campbell RN  Outcome: Not Progressing  5/5/2025 0842 by Lucy Campbell RN  Outcome: Progressing  5/5/2025 0842 by Lucy Campbell RN  Outcome: Progressing  5/5/2025 0842 by Lucy Campbell RN  Outcome: Progressing  Flowsheets (Taken 5/5/2025 0800)  Discharge to home or other facility with appropriate resources: Identify barriers to discharge with patient and caregiver  5/4/2025 2129 by Crow Lozano RN  Outcome: Not Progressing

## 2025-05-05 NOTE — CONSULTS
[facility] Consult      Patient:   Casimiro Vinson  MR#:    [id number]  Account Number:                   [id number]      Room:    [address] [place] [address]   YOB: 1965  Date of Progress Note: 5/5/2025  Time of Note                           12:41 PM  Attending Physician:  Corey Kelley MD  Consulting Physician:  Chong Sales DO       CHIEF COMPLAINT:  Cardiac arrest      HISTORY OF PRESENT ILLNESS:   This is a 60 y.o. male who was admitted with worsening dyspnea, pulmonary edema and chest pain.  He was recently hospitalized in [place] and is status post cardiac stenting with single-vessel coronary artery bypass grafting.  Sudden cardiac arrest noted with an unclear time to return of spontaneous circulation possibly 20 minutes.  He was subsequently diagnosed with pericardial tamponade and was taken for emergent pericardial window.  He remains largely obtunded requiring multiple pressors he also has an Impella in place.  Myoclonic jerking noted initially which has improved today.  Underlying ESRD noted.       REVIEW OF SYSTEMS:  Unable to obtain given AMS.     PAST MEDICAL HISTORY:      Diagnosis Date    Diabetes ([facility])     Dialysis patient     ESRD (end stage renal disease) ([facility])        PAST SURGICAL HISTORY:      Procedure Laterality Date    CARDIAC SURGERY N/A 5/4/2025    PERICARDIAL WINDOW CREATION OPEN WITH PERFUSION performed by Crow Soriano MD at [facility] OR    CARDIAC SURGERY  5/4/2025    PLACEMENT OF 5.5 IMPELLA DEVICE performed by Crow Soriano MD at [facility] OR    CORONARY ARTERY BYPASS GRAFT      TRANSESOPHAGEAL ECHOCARDIOGRAM  5/4/2025    TRANSESOPHAGEAL ECHOCARDIOGRAM performed by Crow Soriano MD at [facility] OR    VASCULAR SURGERY  08/11/2023    Ultrasound guided cannulation of right internal jugular vein AND  Placement of right internal jugular vein tunneled dialysis catheter (Bard Equistream XK 23 cm tip to cuff). VI       SOCIAL HISTORY:   TOBACCO:   reports that he quit smoking about 27  Continuous PRN, Crow Soriano MD    oxyCODONE (ROXICODONE) immediate release tablet 5 mg, 5 mg, Oral, Q4H PRN **OR** oxyCODONE HCl (OXY-IR) immediate release tablet 10 mg, 10 mg, Oral, Q4H PRN, Crow Soriano MD    pantoprazole (PROTONIX) 40 mg in sodium chloride (PF) 0.9 % 10 mL injection, 40 mg, IntraVENous, Daily, Corey Kelley MD, 40 mg at 05/05/25 0744    ceFAZolin (ANCEF) 1,000 mg in sterile water 10 mL IV syringe, 1,000 mg, IntraVENous, Q24H, Crow Soriano MD    midazolam PF (VERSED) injection 2 mg, 2 mg, IntraVENous, Q2H PRN, Corey Kelley MD, 2 mg at 05/05/25 0130    albumin human 25% IV solution 25 g, 25 g, IntraVENous, Q6H, Crow Soriano MD, Last Rate: 200 mL/hr at 05/05/25 1238, 25 g at 05/05/25 1238    sodium chloride flush 0.9 % injection 5-40 mL, 5-40 mL, IntraVENous, PRN, Crow Soriano MD    polyethylene glycol (GLYCOLAX) packet 17 g, 17 g, Oral, Daily PRN, Crow Soriano MD    acetaminophen (TYLENOL) tablet 650 mg, 650 mg, Oral, Q6H PRN, 650 mg at 05/04/25 0055 **OR** acetaminophen (TYLENOL) suppository 650 mg, 650 mg, Rectal, Q6H PRN, Crow Soriano MD    epoetin arianna-epbx (RETACRIT) injection 10,000 Units, 10,000 Units, SubCUTAneous, Once per day on Tuesday Thursday Saturday, Crow Soriano MD, 10,000 Units at 05/03/25 1842    aspirin chewable tablet 81 mg, 81 mg, Oral, Daily, Crow Soriano MD    atorvastatin (LIPITOR) tablet 40 mg, 40 mg, Oral, Nightly, Crow Soriano MD, 40 mg at 05/03/25 1950    famotidine (PEPCID) tablet 20 mg, 20 mg, Oral, Daily PRN, Crow Soriano MD    ferrous sulfate (IRON 325) tablet 650 mg, 650 mg, Oral, Daily with breakfast, Crow Soriano MD    furosemide (LASIX) tablet 40 mg, 40 mg, Oral, Daily, Crow Soriano MD, 40 mg at 05/03/25 1529    montelukast (SINGULAIR) tablet 10 mg, 10 mg, Oral, Daily, Crow Soriano MD, 10 mg at 05/03/25 1529    insulin lispro (HUMALOG,ADMELOG) injection vial 0-4 Units, 0-4 Units, SubCUTAneous, 4x Daily AC & HS,

## 2025-05-05 NOTE — PROGRESS NOTES
Comprehensive Nutrition Assessment    Type and Reason for Visit:  Initial    Nutrition Recommendations/Plan:   Follow for extubation or alternate source of nutrition     Malnutrition Assessment:  Malnutrition Status:  At risk for malnutrition (05/05/25 1208)    Context:  Acute Illness     Findings of the 6 clinical characteristics of malnutrition:  Energy Intake:  Mild decrease in energy intake  Weight Loss:  Mild weight loss     Body Fat Loss:  No body fat loss     Muscle Mass Loss:  No muscle mass loss    Fluid Accumulation:  Moderate to Severe Generalized   Strength:  Not Performed    Nutrition Assessment:    Following patient for vent status.  No Propofol and on numerous vasopressors.  Pt also on dialysis.  GFR 8, K+ 6.3    Nutrition Related Findings:    Glucose 194-262.  A1c 6.3 Wound Type: Surgical Incision       Current Nutrition Intake & Therapies:    Average Meal Intake: NPO  Average Supplements Intake: NPO  Diet NPO    Anthropometric Measures:  Height: 177.8 cm (5' 10\")  Ideal Body Weight (IBW): 166 lbs (75 kg)    Admission Body Weight: 99.8 kg (220 lb 0.3 oz)  Current Body Weight: 97.8 kg (215 lb 9.8 oz), 129.9 % IBW.    Current BMI (kg/m2): 30.9  Usual Body Weight: 98.4 kg (216 lb 14.9 oz)  % Weight Change (Calculated): -0.6  BMI Categories: Obese Class 1 (BMI 30.0-34.9)    Estimated Daily Nutrient Needs:  Energy Requirements Based On: Kcal/kg  Weight Used for Energy Requirements: Current  Energy (kcal/day): 9480-5695 kcals (11-14 kcals/kg)  Weight Used for Protein Requirements: Ideal  Protein (g/day): 151g  Method Used for Fluid Requirements: Standard renal  Fluid (ml/day): 3933-8376 ml    Nutrition Diagnosis:   Inadequate oral intake, Altered nutrition-related lab values related to acute injury/trauma, impaired respiratory function, renal dysfunction, endocrine dysfunction as evidenced by NPO or clear liquid status due to medical condition, intubation, lab values, wounds    Nutrition Interventions:

## 2025-05-05 NOTE — PLAN OF CARE
Nutrition Problem #1: Inadequate oral intake, Altered nutrition-related lab values  Intervention: Food and/or Nutrient Delivery: Continue NPO  Nutritional      Problem: Chronic Conditions and Co-morbidities  Goal: Patient's chronic conditions and co-morbidity symptoms are monitored and maintained or improved  5/5/2025 0843 by Lucy Campbell RN  Outcome: Not Progressing  5/5/2025 0842 by Lucy Campbell RN  Outcome: Progressing  5/5/2025 0842 by Lucy Campbell RN  Outcome: Progressing  5/5/2025 0842 by Lucy Campbell RN  Outcome: Progressing  Flowsheets (Taken 5/5/2025 0800)  Care Plan - Patient's Chronic Conditions and Co-Morbidity Symptoms are Monitored and Maintained or Improved: Monitor and assess patient's chronic conditions and comorbid symptoms for stability, deterioration, or improvement     Problem: Discharge Planning  Goal: Discharge to home or other facility with appropriate resources  5/5/2025 0843 by Lucy Campbell RN  Outcome: Not Progressing  5/5/2025 0842 by Lucy Campbell RN  Outcome: Progressing  5/5/2025 0842 by Lucy Campbell RN  Outcome: Progressing  5/5/2025 0842 by Lucy Campbell RN  Outcome: Progressing  Flowsheets (Taken 5/5/2025 0800)  Discharge to home or other facility with appropriate resources: Identify barriers to discharge with patient and caregiver

## 2025-05-05 NOTE — PROGRESS NOTES
Nephrology ([facility]) Progress Note    Patient:  Casimiro Vinson  YOB: 1965  Date of Service: 5/5/2025  MRN: [id number]   Acct: [id number]   Primary Care Physician: Abdi Lozano DO  Advance Directive: Full Code  Admit Date: 5/3/2025       Hospital Day: 2  Referring Provider: Corey Kelley MD    Patient independently seen and examined, Chart, Consults, Notes, Operative notes, Labs, Cardiology, and Radiology studies reviewed as available.    Subjective:  Casimiro Vinson is a 60 y.o. male for whom we were consulted for evaluation and treatment of end-stage renal disease.  He is a Monday Wednesday Friday hemodialysis patient.  He developed worsening dyspnea and so presented to the emergency room for evaluation was diagnosed with pulmonary edema and chest pain.  He had recently been admitted to [facility] for cardiac stenting and single-vessel CABG.  He subsequently underwent CODE BLUE with subsequent ROSC.  He was diagnosed with pericardial tamponade and taken for an emergent pericardial window.    Today, patient remains unable to participate in the history and physical examination.  Requiring Jarett-Synephrine, Levophed, vasopressin for blood pressure support.  Also requiring Impella.  Family present at the bedside and reviewed with nursing and Dr. Kelley.    Allergies:  Levaquin [levofloxacin]    Medicines:  Current Facility-Administered Medications   Medication Dose Route Frequency Provider Last Rate Last Admin    azithromycin (ZITHROMAX) 500 mg in sodium chloride 0.9 % 250 mL IVPB ([id number])  500 mg IntraVENous Q24H Bo, MD Crow   Stopped at 05/05/25 0400    cefTRIAXone (ROCEPHIN) 1,000 mg in sterile water 10 mL IV syringe  1,000 mg IntraVENous Q24H [place], MD Crow   1,000 mg at 05/05/25 0255    EPINEPHrine 5 mg in sodium chloride 0.9 % 250 mL infusion  1-20 mcg/min IntraVENous Continuous Bo, MD Crow   Stopped at 05/05/25 0632

## 2025-05-05 NOTE — CONSULTS
Use drops as directed.  If there is still some pain (even if improved) at the end of 5 days, please return to UC to be re-evaluated.   neglect, hurt, or threaten the patient?: No   Housing Stability: Low Risk  (5/3/2025)    Housing Stability Vital Sign     Unable to Pay for Housing in the Last Year: No     Number of Times Moved in the Last Year: 0     Homeless in the Last Year: No       Allergies:  Allergies   Allergen Reactions    Levaquin [Levofloxacin] Itching and Rash       Home Meds:  Prior to Admission medications    Medication Sig Start Date End Date Taking? Authorizing Provider   sevelamer (RENVELA) 800 MG tablet Take 2 tablets by mouth 3 times daily (with meals)   Yes Shubham Solitario MD   oxyCODONE 5 MG capsule Take 1 capsule by mouth every 6 hours as needed for Pain. Max Daily Amount: 20 mg   Yes Shubham Solitario MD   cetirizine (ZYRTEC) 10 MG tablet Take 1 tablet by mouth daily   Yes Shubham Solitario MD   clopidogrel (PLAVIX) 75 MG tablet TAKE 1 TABLET BY MOUTH EVERY DAY 12/2/24  Yes Paula Sherman APRN   ENTRESTO 24-26 MG per tablet Take 1 tablet by mouth 2 times daily   Yes Shubham Solitario MD   aspirin 81 MG chewable tablet Take 1 tablet by mouth daily 8/17/23  Yes Cordell Flores MD   famotidine (PEPCID) 40 MG tablet Take 0.5 tablets by mouth daily as needed (heartburn, reflux) 8/16/23  Yes Cordell Flores MD   atorvastatin (LIPITOR) 40 MG tablet Take 1 tablet by mouth nightly 8/16/23  Yes Cordell Flores MD   ferrous sulfate (IRON 325) 325 (65 Fe) MG tablet Take 2 tablets by mouth daily (with breakfast)   Yes Shubham Solitario MD   insulin lispro (HUMALOG) 100 UNIT/ML injection cartridge Inject into the skin 4 times daily (before meals and nightly)   Yes Shubham Solitario MD   amLODIPine (NORVASC) 10 MG tablet Take 1 tablet by mouth daily 10/30/20   Shubham Solitario MD   metoprolol tartrate (LOPRESSOR) 50 MG tablet Take 1 tablet by mouth 2 times daily 5/21/21   Shubham Solitario MD   carvedilol (COREG) 12.5 MG tablet Take 1 tablet by mouth 2 times daily  Patient taking differently: Take        Intake/Output Summary (Last 24 hours) at 5/5/2025 0756  Last data filed at 5/5/2025 0700  Gross per 24 hour   Intake 49672.83 ml   Output 2065 ml   Net 8888.83 ml     Estimated body mass index is 30.94 kg/m² as calculated from the following:    Height as of this encounter: 1.778 m (5' 10\").    Weight as of this encounter: 97.8 kg (215 lb 9.8 oz).        Physical Exam  Vitals reviewed.   Constitutional:       General: He is not in acute distress.     Appearance: He is well-developed. He is ill-appearing. He is not toxic-appearing or diaphoretic.      Comments: Intubated and nonresponsive targeted temperature management blanket in place   HENT:      Head: Normocephalic and atraumatic.      Nose: Nose normal.      Mouth/Throat:      Mouth: Mucous membranes are moist.      Pharynx: Oropharynx is clear.   Eyes:      General: No scleral icterus.     Extraocular Movements: Extraocular movements intact.      Pupils: Pupils are equal, round, and reactive to light.   Neck:      Vascular: No carotid bruit or JVD.   Cardiovascular:      Rate and Rhythm: Normal rate and regular rhythm.      Heart sounds: Normal heart sounds. No murmur heard.     No friction rub. No gallop.   Pulmonary:      Effort: Pulmonary effort is normal. No respiratory distress.      Breath sounds: Normal breath sounds. No wheezing or rales.   Abdominal:      General: Abdomen is flat. Bowel sounds are normal. There is no distension.      Palpations: Abdomen is soft. There is no mass.      Tenderness: There is no abdominal tenderness. There is no right CVA tenderness, left CVA tenderness, guarding or rebound.      Hernia: No hernia is present.   Musculoskeletal:         General: No swelling, tenderness, deformity or signs of injury.      Cervical back: Normal range of motion and neck supple. No rigidity or tenderness.      Right lower leg: No edema.      Left lower leg: No edema.   Lymphadenopathy:      Cervical: No cervical adenopathy.   Skin:

## 2025-05-05 NOTE — PROGRESS NOTES
Lisaella prompted to input new CO. New POOJA score obtained. CO 7.0 and CI 3.2    Electronically signed by Crow Lozano RN on 5/5/2025 at 6:44 AM

## 2025-05-05 NOTE — PROGRESS NOTES
Cardiology Progress Note Trevor Brown MD      Patient:  Casimiro Vinson  [id number]    Patient Active Problem List    Diagnosis Date Noted    ESRD needing dialysis (formerly [facility]) 05/03/2025     Priority: Low    Acute kidney injury superimposed on chronic kidney disease 08/10/2023     Priority: Low    Shortness of breath 08/10/2023     Priority: Low    Acute nausea with nonbilious vomiting 08/10/2023     Priority: Low    Tobacco use 08/10/2023     Priority: Low    Anemia, unspecified 08/10/2023     Priority: Low    Type II diabetes mellitus (formerly [facility]) 08/10/2023     Priority: Low       Admit Date:  5/3/2025    Admission Problem List: Present on Admission:   ESRD needing dialysis (formerly [facility])   Type II diabetes mellitus (formerly [facility])   Shortness of breath      Cardiac Specific Data:  Specialty Problems    None    1.  Coronary artery disease, recent anteroseptal MI, ejection fraction 35-40%, status post PCI 8/15/2023 to mid LAD (3.5 x 18 mm Ozan) and co-dominant mid circumflex (4.0 x 8 mm Ozan), reported abnormal stress test (prerenal transplant eval), reported restenosis with one-vessel robotic assisted LIMA to LAD CABG 4/17/2025.  2.  Insulin requiring diabetes mellitus.  3.  CKD stage V, initiated on hemodialysis 8/12/2023.  4.  Peripheral arterial disease with right TMA 10/2020.  5.  Active ongoing tobacco chewing.  6.  Hypertension.  7.  Asystolic arrest 5/4/2025 with large pericardial effusion post CABG 4/17/2025, undergoing open sternotomy with pericardial drainage, recurrent arrest, Impella 5.5 LV assist device placement.    Subjective:  Mr. Vinson is currently intubated but unresponsive.  On multiple pressors including Levophed at 70 mics per minute, Jarett-Synephrine 300 mics per minute and vasopressin.  Hypothermic with Tmax 106 °F.  On 100% FiO2 on ventilator.  Currently receiving hemodialysis.  Events noted.  Apparently was having renal transplant evaluation at [facility] with a stress test showing possible ischemia with restenosis  reported.  No documentation available.  Subsequently underwent one-vessel robotic assisted CABG with LIMA to LAD at [facility] 4/17/2025.  Discharged 4/22/2025.  Admitted 5/3/2025 with shortness of breath and lightheadedness.  Reported asystolic arrest around 2 AM 5/4/2025 with subsequent ROSC on multiple pressors with evidence of large pericardial effusion with tamponade.  Taken to the OR emergently with unsuccessful window and subsequent open sternotomy with asystole requiring open cardiac massage, pericardial drainage with subsequent placement of Impella 5.5 via ascending aortic graft.    Objective:   BP (!) 101/52   Pulse (!) 139   Temp 98.9 °F (37.2 °C) (Rectal) Comment: Simultaneous filing. User may not have seen previous data.  Resp 28   Ht 1.778 m (5' 10\")   Wt 97.5 kg (215 lb)   SpO2 (!) 86%   BMI 30.85 kg/m²       Intake/Output Summary (Last 24 hours) at 5/5/2025 1618  Last data filed at 5/5/2025 1600  Gross per 24 hour   Intake 6257.46 ml   Output 1310 ml   Net 4947.46 ml       Prior to Admission medications    Medication Sig Start Date End Date Taking? Authorizing Provider   sevelamer (RENVELA) 800 MG tablet Take 2 tablets by mouth 3 times daily (with meals)   Yes Shubham Solitario MD   oxyCODONE 5 MG capsule Take 1 capsule by mouth every 6 hours as needed for Pain. Max Daily Amount: 20 mg   Yes Shubham Solitario MD   cetirizine (ZYRTEC) 10 MG tablet Take 1 tablet by mouth daily   Yes Shubham Solitario MD   clopidogrel (PLAVIX) 75 MG tablet TAKE 1 TABLET BY MOUTH EVERY DAY 12/2/24  Yes Paula Sherman APRN   ENTRESTO 24-26 MG per tablet Take 1 tablet by mouth 2 times daily   Yes Shubham Solitario MD   aspirin 81 MG chewable tablet Take 1 tablet by mouth daily 8/17/23  Yes Cordell Flores MD   famotidine (PEPCID) 40 MG tablet Take 0.5 tablets by mouth daily as needed (heartburn, reflux) 8/16/23  Yes Cordell Flores MD   atorvastatin (LIPITOR) 40 MG tablet Take 1 tablet

## 2025-05-05 NOTE — CONSULTS
Pulmonary and Critical Care Consult Note    [facility] MELISA Vinson    MRN# [id number]    Acct# [id number]  5/5/2025   8:35 AM CDT    Referring Provider:Corey Kelley MD      Chief Complaint: Respiratory failure on the vent.     Requesting physician: Dr. Soriano.     Reason for consult: resp failure on the vent.       HPI: We have been consulted to see this 60 y.o. year old male born on 1965. The patient is intubated on the vent and sedated. The patient is intubated on the vent and sedated. History obtained from the records and nursing staff.  The patient presented to the hospital and was admitted on the third of this month due to increased shortness of breath and lightheadedness.  He has end-stage renal disease on hemodialysis and dialysis.  He underwent coronary bypass at [facility] in [place] on 4/17/2025.  He does have a history of diabetes.  His workup in the emergency room showed small pleural effusion on the chest x-ray.  His troponin was 120.  BNP was 11.  He was hyponatremic with a sodium of 129.  The patient was evaluated by CT surgery due to shortness of breath and history of recent CABG.  About 4 hours prior to evaluation by thoracic surgery the patient sustained cardiac arrest.  Further workup revealed cardiac tamponade with pericardial effusion.  The patient was taken to the operating room and hadEmergency sternotomy.  About 800 cc of blood was evacuated.  His hemodynamics improved.  No obvious source of the bleeding was seen.  Currently the patient is having myoclonic jerks.  His temperature was 105 max.      Past Medical History      Past Medical History:   Diagnosis Date    Diabetes ([facility])     Dialysis patient     ESRD (end stage renal disease) ([facility])      SurgicalHistory  Past Surgical History:   Procedure Laterality Date    CORONARY ARTERY BYPASS GRAFT      VASCULAR

## 2025-05-05 NOTE — PROGRESS NOTES
Progress Note    2025 5:33 AM          POD#   1    Subjective:  Mr. Vinson remains intubated and unresponsive to pain. No sedation since yesterday morning.  PULSE OXIMETRY RANGE: SpO2  Av.2 %  Min: 79 %  Max: 100 %  SUPPLEMENTAL O2:     Vital Signs: BP (!) 80/31   Pulse 87   Temp (!) 106.1 °F (41.2 °C) (Rectal)   Resp 20   Ht 1.778 m (5' 10\")   Wt 97.8 kg (215 lb 9.8 oz)   SpO2 (!) 87%   BMI 30.94 kg/m²    Temperature Range:   Temp: (!) 106.1 °F (41.2 °C)   Temp  Av.8 °F (36.6 °C)  Min: 95.5 °F (35.3 °C)  Max: 106.1 °F (41.2 °C)                   Rhythm: normal sinus rhythm    Labs:   ABG:    Lab Results   Component Value Date/Time    PHART 7.250 2025 04:07 AM    PO2ART 63.0 2025 04:07 AM    IRG0TML 54.0 2025 04:07 AM    N0CMVDSB 90.2 2025 04:07 AM    WTD0PXG 18 2025 09:29 AM    XSN0OMF 23.7 2025 04:07 AM    BEART -4.0 2025 04:07 AM     CBC:   Recent Labs     25  1925 25  2156 25  0306   WBC 17.6* 16.3* 11.6*   HGB 12.1* 11.8* 11.3*   HCT 37.0* 35.6* 33.5*   MCV 92.5 92.0 90.8    145 101*     BMP:   Recent Labs     25  1022 25  1028 25  1345 25  1530 25  2300 25  0306 25  0407     --  142  --   --  140  --    K 5.1   < > 5.1*   < > 6.6 6.3* 6.2   CL 96*  --  100  --   --  102  --    CO2 16*  --  15*  --   --  21*  --    BUN 31*  --  30*  --   --  35*  --    CREATININE 7.3*  --  6.8*  --   --  7.5*  --     < > = values in this interval not displayed.     PT/INR:   Recent Labs     25  0522 25  1022 25  0306   PROTIME 31.2* 27.8* 35.2*   INR 3.08* 2.65* 3.61*     APTT:   Recent Labs     25  1022   APTT 54.9*     Chest X-Ray:  Left lung well expanded, no infiltrates or effusions  CT:  I/O last 3 completed shifts:  In: 8318.5 [I.V.:4195.7; Blood:2836; IV Piggyback:286.9]  Out: 4590 [Urine:20; Chest Tube:1570]      Air Leak:  No air leak  I/O last 3 completed

## 2025-05-05 NOTE — PLAN OF CARE
Problem: ABCDS Injury Assessment  Goal: Absence of physical injury  Outcome: Progressing     Problem: Safety - Adult  Goal: Free from fall injury  Outcome: Progressing     Problem: Skin/Tissue Integrity  Goal: Skin integrity remains intact  Description: 1.  Monitor for areas of redness and/or skin breakdown2.  Assess vascular access sites hourly3.  Every 4-6 hours minimum:  Change oxygen saturation probe site4.  Every 4-6 hours:  If on nasal continuous positive airway pressure, respiratory therapy assess nares and determine need for appliance change or resting period  Outcome: Progressing     Problem: Chronic Conditions and Co-morbidities  Goal: Patient's chronic conditions and co-morbidity symptoms are monitored and maintained or improved  Outcome: Not Progressing     Problem: Discharge Planning  Goal: Discharge to home or other facility with appropriate resources  Outcome: Not Progressing

## 2025-05-05 NOTE — PROGRESS NOTES
Pt's sister requested for a filomena to speak with the family. CH called and notified of pt's family's wishes. CH called in the on-call filomena. Martha came to bedside at approx 0140. Martha was provided pt back story and pertinent history. Filomena is currently in ICU waiting room talking with the pt's family.    Electronically signed by Crow Lozano RN on 5/5/2025 at 1:53 AM

## 2025-05-05 NOTE — CARE COORDINATION
Patient is critically ill at this time on the ventilator and pt's family not interested in discussing pt condition prior to admission or any type of discharge planning at this time.  Will follow and attempt again at later time. Electronically signed by Cathy Meneses RN on 5/5/2025 at 8:17 AM

## 2025-05-05 NOTE — PROGRESS NOTES
POOJA calculated. CO 5.8, CI 2.6    Electronically signed by Crow Lozano RN on 5/4/2025 at 11:25 PM

## 2025-05-06 ENCOUNTER — APPOINTMENT (OUTPATIENT)
Dept: GENERAL RADIOLOGY | Age: 60
DRG: 001 | End: 2025-05-06
Payer: MEDICARE

## 2025-05-06 LAB
ALBUMIN SERPL-MCNC: 3.5 G/DL (ref 3.5–5.2)
ALLENS TEST: ABNORMAL
ALLENS TEST: ABNORMAL
ALP SERPL-CCNC: 167 U/L (ref 40–129)
ALT SERPL-CCNC: 612 U/L (ref 10–50)
ANION GAP SERPL CALCULATED.3IONS-SCNC: 14 MMOL/L (ref 8–16)
AST SERPL-CCNC: 3085 U/L (ref 10–50)
BASE EXCESS ARTERIAL: -1.2 MMOL/L (ref -2–2)
BASE EXCESS ARTERIAL: -1.7 MMOL/L (ref -2–2)
BASE EXCESS ARTERIAL: -2.1 MMOL/L (ref -2–2)
BASE EXCESS ARTERIAL: -2.8 MMOL/L (ref -2–2)
BASE EXCESS VENOUS: -2 MMOL/L
BASOPHILS # BLD: 0.1 K/UL (ref 0–0.2)
BASOPHILS NFR BLD: 0.8 % (ref 0–1)
BILIRUB SERPL-MCNC: 5.9 MG/DL (ref 0.2–1.2)
BUN SERPL-MCNC: 24 MG/DL (ref 8–23)
CALCIUM SERPL-MCNC: 7.9 MG/DL (ref 8.8–10.2)
CARBOXYHEMOGLOBIN ARTERIAL: 1.6 % (ref 0–5)
CARBOXYHEMOGLOBIN ARTERIAL: 1.7 % (ref 0–5)
CARBOXYHEMOGLOBIN ARTERIAL: 1.9 % (ref 0–5)
CARBOXYHEMOGLOBIN ARTERIAL: 1.9 % (ref 0–5)
CARBOXYHEMOGLOBIN: 1.8 %
CARBOXYHEMOGLOBIN: 1.8 %
CARBOXYHEMOGLOBIN: 2 %
CARBOXYHEMOGLOBIN: 2 %
CARBOXYHEMOGLOBIN: 2.8 %
CHLORIDE SERPL-SCNC: 104 MMOL/L (ref 98–107)
CO2 SERPL-SCNC: 21 MMOL/L (ref 22–29)
CREAT SERPL-MCNC: 4.5 MG/DL (ref 0.7–1.2)
EOSINOPHIL # BLD: 0.2 K/UL (ref 0–0.6)
EOSINOPHIL NFR BLD: 2.5 % (ref 0–5)
ERYTHROCYTE [DISTWIDTH] IN BLOOD BY AUTOMATED COUNT: 16.7 % (ref 11.5–14.5)
ERYTHROCYTE [DISTWIDTH] IN BLOOD BY AUTOMATED COUNT: 16.7 % (ref 11.5–14.5)
FIO2: 100 %
FIO2: 100 %
FIO2: 70 %
FIO2: 75 %
GLUCOSE BLD-MCNC: 133 MG/DL (ref 70–99)
GLUCOSE BLD-MCNC: 140 MG/DL (ref 70–99)
GLUCOSE BLD-MCNC: 145 MG/DL (ref 70–99)
GLUCOSE SERPL-MCNC: 158 MG/DL (ref 70–99)
HCO3 ARTERIAL: 22.6 MMOL/L (ref 22–26)
HCO3 ARTERIAL: 23 MMOL/L (ref 22–26)
HCO3 ARTERIAL: 23.6 MMOL/L (ref 22–26)
HCO3 ARTERIAL: 23.7 MMOL/L (ref 22–26)
HCO3 VENOUS: 23 MMOL/L (ref 23–29)
HCO3 VENOUS: 24 MMOL/L (ref 23–29)
HCO3 VENOUS: 25 MMOL/L (ref 23–29)
HCT VFR BLD AUTO: 26.1 % (ref 42–52)
HCT VFR BLD AUTO: 26.9 % (ref 42–52)
HEMOGLOBIN, ART, EXTENDED: 9.1 G/DL (ref 14–18)
HEMOGLOBIN, ART, EXTENDED: 9.3 G/DL (ref 14–18)
HEMOGLOBIN, ART, EXTENDED: 9.6 G/DL (ref 14–18)
HEMOGLOBIN, ART, EXTENDED: 9.9 G/DL (ref 14–18)
HGB BLD-MCNC: 8.7 G/DL (ref 14–18)
HGB BLD-MCNC: 9 G/DL (ref 14–18)
IMM GRANULOCYTES # BLD: 0.4 K/UL
INR PPP: 4.5 (ref 0.88–1.18)
LACTATE BLDV-SCNC: 2.5 MMOL/L (ref 0.5–1.9)
LACTATE BLDV-SCNC: 3 MMOL/L (ref 0.5–1.9)
LYMPHOCYTES # BLD: 0.7 K/UL (ref 1.1–4.5)
LYMPHOCYTES NFR BLD: 11 % (ref 20–40)
MAGNESIUM SERPL-MCNC: 1.9 MG/DL (ref 1.6–2.4)
MCH RBC QN AUTO: 30.4 PG (ref 27–31)
MCH RBC QN AUTO: 30.5 PG (ref 27–31)
MCHC RBC AUTO-ENTMCNC: 33.3 G/DL (ref 33–37)
MCHC RBC AUTO-ENTMCNC: 33.5 G/DL (ref 33–37)
MCV RBC AUTO: 91.2 FL (ref 80–94)
MCV RBC AUTO: 91.3 FL (ref 80–94)
MECHANICAL RATE IN BPM: 22
METHEMOGLOBIN ARTERIAL: 1.1 %
METHEMOGLOBIN ARTERIAL: 1.2 %
METHEMOGLOBIN VENOUS: 0.6 %
METHEMOGLOBIN VENOUS: 1 %
METHEMOGLOBIN VENOUS: 1.1 %
METHEMOGLOBIN VENOUS: 1.3 %
METHEMOGLOBIN VENOUS: 1.4 %
MODE: ABNORMAL
MONOCYTES # BLD: 0.3 K/UL (ref 0–0.9)
MONOCYTES NFR BLD: 5.4 % (ref 0–10)
NEUTROPHILS # BLD: 4.4 K/UL (ref 1.5–7.5)
NEUTS SEG NFR BLD: 74.4 % (ref 50–65)
O2 CONTENT ARTERIAL: 12.6 ML/DL
O2 CONTENT ARTERIAL: 12.9 ML/DL
O2 CONTENT ARTERIAL: 13.8 ML/DL
O2 CONTENT ARTERIAL: 14 ML/DL
O2 CONTENT, VEN: 11 ML/DL
O2 CONTENT, VEN: 12 ML/DL
O2 CONTENT, VEN: 12 ML/DL
O2 CONTENT, VEN: 9 ML/DL
O2 CONTENT, VEN: 9 ML/DL
O2 SAT, ARTERIAL: 96.8 %
O2 SAT, ARTERIAL: 96.9 %
O2 SAT, ARTERIAL: 97.4 %
O2 SAT, ARTERIAL: 97.5 %
O2 SAT, VEN: 68 %
O2 SAT, VEN: 71 %
O2 SAT, VEN: 81 %
O2 SAT, VEN: 89 %
O2 SAT, VEN: 94 %
O2 THERAPY: ABNORMAL
O2 THERAPY: NORMAL
PCO2 ARTERIAL: 38 MMHG (ref 35–45)
PCO2 ARTERIAL: 39 MMHG (ref 35–45)
PCO2 ARTERIAL: 41 MMHG (ref 35–45)
PCO2 ARTERIAL: 44 MMHG (ref 35–45)
PCO2 VENOUS: 42 MMHG (ref 40–50)
PCO2 VENOUS: 44 MMHG (ref 40–50)
PCO2 VENOUS: 46 MMHG (ref 40–50)
PCO2 VENOUS: 46 MMHG (ref 40–50)
PCO2 VENOUS: 52 MMHG (ref 40–50)
PERFORMED ON: ABNORMAL
PH ARTERIAL: 7.34 (ref 7.35–7.45)
PH ARTERIAL: 7.35 (ref 7.35–7.45)
PH ARTERIAL: 7.39 (ref 7.35–7.45)
PH ARTERIAL: 7.39 (ref 7.35–7.45)
PH VENOUS: 7.29 (ref 7.35–7.45)
PH VENOUS: 7.32 (ref 7.35–7.45)
PH VENOUS: 7.33 (ref 7.35–7.45)
PH VENOUS: 7.34 (ref 7.35–7.45)
PH VENOUS: 7.35 (ref 7.35–7.45)
PLATELET # BLD AUTO: 35 K/UL (ref 130–400)
PLATELET # BLD AUTO: 43 K/UL (ref 130–400)
PMV BLD AUTO: 12.2 FL (ref 9.4–12.4)
PMV BLD AUTO: 12.3 FL (ref 9.4–12.4)
PO2 ARTERIAL: 117 MMHG (ref 80–100)
PO2 ARTERIAL: 118 MMHG (ref 80–100)
PO2 ARTERIAL: 187 MMHG (ref 80–100)
PO2 ARTERIAL: 265 MMHG (ref 80–100)
PO2 VENOUS: 40 MMHG
PO2 VENOUS: 42 MMHG
PO2 VENOUS: 48 MMHG
PO2 VENOUS: 63 MMHG
PO2 VENOUS: 72 MMHG
POSITIVE END EXP PRESS: 5
POTASSIUM BLD-SCNC: 4.1 MMOL/L
POTASSIUM BLD-SCNC: 4.2 MMOL/L
POTASSIUM BLD-SCNC: 4.6 MMOL/L
POTASSIUM BLD-SCNC: 4.6 MMOL/L
POTASSIUM SERPL-SCNC: 4.4 MMOL/L (ref 3.5–5.1)
PROT SERPL-MCNC: 4.7 G/DL (ref 6.4–8.3)
PROTHROMBIN TIME: 41.6 SEC (ref 12–14.6)
RBC # BLD AUTO: 2.86 M/UL (ref 4.7–6.1)
RBC # BLD AUTO: 2.95 M/UL (ref 4.7–6.1)
SAMPLE SOURCE: ABNORMAL
SODIUM SERPL-SCNC: 139 MMOL/L (ref 136–145)
VT MECHANICAL: 450 %
WBC # BLD AUTO: 5.1 K/UL (ref 4.8–10.8)
WBC # BLD AUTO: 5.9 K/UL (ref 4.8–10.8)

## 2025-05-06 PROCEDURE — 99024 POSTOP FOLLOW-UP VISIT: CPT | Performed by: SURGERY

## 2025-05-06 PROCEDURE — 2700000000 HC OXYGEN THERAPY PER DAY

## 2025-05-06 PROCEDURE — 82803 BLOOD GASES ANY COMBINATION: CPT

## 2025-05-06 PROCEDURE — 6360000002 HC RX W HCPCS: Performed by: SURGERY

## 2025-05-06 PROCEDURE — 99291 CRITICAL CARE FIRST HOUR: CPT | Performed by: INTERNAL MEDICINE

## 2025-05-06 PROCEDURE — 94760 N-INVAS EAR/PLS OXIMETRY 1: CPT

## 2025-05-06 PROCEDURE — 2500000003 HC RX 250 WO HCPCS: Performed by: SURGERY

## 2025-05-06 PROCEDURE — 80053 COMPREHEN METABOLIC PANEL: CPT

## 2025-05-06 PROCEDURE — 2580000003 HC RX 258: Performed by: SURGERY

## 2025-05-06 PROCEDURE — 85610 PROTHROMBIN TIME: CPT

## 2025-05-06 PROCEDURE — 99233 SBSQ HOSP IP/OBS HIGH 50: CPT | Performed by: INTERNAL MEDICINE

## 2025-05-06 PROCEDURE — 37799 UNLISTED PX VASCULAR SURGERY: CPT

## 2025-05-06 PROCEDURE — 82962 GLUCOSE BLOOD TEST: CPT

## 2025-05-06 PROCEDURE — 83605 ASSAY OF LACTIC ACID: CPT

## 2025-05-06 PROCEDURE — 94003 VENT MGMT INPAT SUBQ DAY: CPT

## 2025-05-06 PROCEDURE — 83735 ASSAY OF MAGNESIUM: CPT

## 2025-05-06 PROCEDURE — 6370000000 HC RX 637 (ALT 250 FOR IP): Performed by: SURGERY

## 2025-05-06 PROCEDURE — 82800 BLOOD PH: CPT

## 2025-05-06 PROCEDURE — 95816 EEG AWAKE AND DROWSY: CPT | Performed by: PSYCHIATRY & NEUROLOGY

## 2025-05-06 PROCEDURE — 2000000000 HC ICU R&B

## 2025-05-06 PROCEDURE — 85027 COMPLETE CBC AUTOMATED: CPT

## 2025-05-06 PROCEDURE — 95822 EEG COMA OR SLEEP ONLY: CPT

## 2025-05-06 PROCEDURE — 85025 COMPLETE CBC W/AUTO DIFF WBC: CPT

## 2025-05-06 PROCEDURE — 2580000003 HC RX 258: Performed by: INTERNAL MEDICINE

## 2025-05-06 PROCEDURE — P9047 ALBUMIN (HUMAN), 25%, 50ML: HCPCS | Performed by: SURGERY

## 2025-05-06 PROCEDURE — 99233 SBSQ HOSP IP/OBS HIGH 50: CPT | Performed by: PSYCHIATRY & NEUROLOGY

## 2025-05-06 PROCEDURE — 6360000002 HC RX W HCPCS: Performed by: INTERNAL MEDICINE

## 2025-05-06 PROCEDURE — 71045 X-RAY EXAM CHEST 1 VIEW: CPT

## 2025-05-06 RX ORDER — CALCIUM GLUCONATE 20 MG/ML
1000 INJECTION, SOLUTION INTRAVENOUS ONCE
Status: COMPLETED | OUTPATIENT
Start: 2025-05-06 | End: 2025-05-07

## 2025-05-06 RX ADMIN — VASOPRESSIN 0.03 UNITS/MIN: 20 INJECTION INTRAVENOUS at 01:45

## 2025-05-06 RX ADMIN — AZITHROMYCIN MONOHYDRATE 500 MG: 500 INJECTION, POWDER, LYOPHILIZED, FOR SOLUTION INTRAVENOUS at 03:27

## 2025-05-06 RX ADMIN — PHENYLEPHRINE HYDROCHLORIDE 250 MCG/MIN: 10 INJECTION INTRAVENOUS at 11:03

## 2025-05-06 RX ADMIN — PHENYLEPHRINE HYDROCHLORIDE 300 MCG/MIN: 10 INJECTION INTRAVENOUS at 07:02

## 2025-05-06 RX ADMIN — SODIUM BICARBONATE: 84 INJECTION, SOLUTION INTRAVENOUS at 10:00

## 2025-05-06 RX ADMIN — HEPARIN SODIUM 1800 UNITS: 1000 INJECTION INTRAVENOUS; SUBCUTANEOUS at 14:00

## 2025-05-06 RX ADMIN — VASOPRESSIN 0.03 UNITS/MIN: 20 INJECTION INTRAVENOUS at 11:52

## 2025-05-06 RX ADMIN — ALBUMIN (HUMAN) 25 G: 0.25 INJECTION, SOLUTION INTRAVENOUS at 05:49

## 2025-05-06 RX ADMIN — POLYVINYL ALCOHOL 2 DROP: 1.4 SOLUTION/ DROPS OPHTHALMIC at 20:30

## 2025-05-06 RX ADMIN — CALCIUM GLUCONATE 1000 MG: 20 INJECTION, SOLUTION INTRAVENOUS at 23:37

## 2025-05-06 RX ADMIN — PHENYLEPHRINE HYDROCHLORIDE 300 MCG/MIN: 10 INJECTION INTRAVENOUS at 00:53

## 2025-05-06 RX ADMIN — EPOETIN ALFA-EPBX 10000 UNITS: 10000 INJECTION, SOLUTION INTRAVENOUS; SUBCUTANEOUS at 16:24

## 2025-05-06 RX ADMIN — ALBUMIN (HUMAN) 25 G: 0.25 INJECTION, SOLUTION INTRAVENOUS at 11:49

## 2025-05-06 RX ADMIN — SODIUM CHLORIDE, PRESERVATIVE FREE 40 MG: 5 INJECTION INTRAVENOUS at 08:25

## 2025-05-06 RX ADMIN — ALBUMIN (HUMAN) 25 G: 0.25 INJECTION, SOLUTION INTRAVENOUS at 23:40

## 2025-05-06 RX ADMIN — ALBUMIN (HUMAN) 25 G: 0.25 INJECTION, SOLUTION INTRAVENOUS at 16:23

## 2025-05-06 RX ADMIN — POLYVINYL ALCOHOL 2 DROP: 1.4 SOLUTION/ DROPS OPHTHALMIC at 08:26

## 2025-05-06 RX ADMIN — WATER 1000 MG: 1 INJECTION INTRAMUSCULAR; INTRAVENOUS; SUBCUTANEOUS at 03:23

## 2025-05-06 RX ADMIN — INSULIN GLARGINE 15 UNITS: 100 INJECTION, SOLUTION SUBCUTANEOUS at 20:28

## 2025-05-06 ASSESSMENT — PULMONARY FUNCTION TESTS
PIF_VALUE: 29
PIF_VALUE: 29
PIF_VALUE: 26
PIF_VALUE: 35
PIF_VALUE: 23
PIF_VALUE: 39
PIF_VALUE: 24
PIF_VALUE: 35
PIF_VALUE: 29
PIF_VALUE: 38
PIF_VALUE: 23
PIF_VALUE: 26
PIF_VALUE: 27
PIF_VALUE: 28
PIF_VALUE: 33
PIF_VALUE: 41
PIF_VALUE: 31
PIF_VALUE: 37
PIF_VALUE: 37
PIF_VALUE: 27
PIF_VALUE: 28
PIF_VALUE: 36
PIF_VALUE: 26
PIF_VALUE: 25
PIF_VALUE: 28
PIF_VALUE: 31
PIF_VALUE: 32
PIF_VALUE: 27

## 2025-05-06 NOTE — PROGRESS NOTES
4 Eyes Skin Assessment     NAME:  Casimiro Vinson  YOB: 1965  MEDICAL RECORD NUMBER:  [id number]    The patient is being assessed for  Post-Op Surgical    I agree that at least one RN has performed a thorough Head to Toe Skin Assessment on the patient. ALL assessment sites listed below have been assessed.      Areas assessed by both nurses:    Head, Face, Ears, Shoulders, Back, Chest, Arms, Elbows, Hands, Sacrum. Buttock, Coccyx, Ischium, Legs. Feet and Heels, and Under Medical Devices         Does the Patient have a Wound? Yes wound(s) were present on assessment. LDA wound assessment was Initiated and completed by RN     Surgical incisions on chest, skin tear/opening on right, blisters/weeping on Left arm  Benja Prevention initiated by RN: Yes  Wound Care Orders initiated by RN: Yes    Pressure Injury (Stage 3,4, Unstageable, DTI, NWPT, and Complex wounds) if present, place Wound referral order by RN under : No    New Ostomies, if present place, Ostomy referral order under : No     Nurse 1 eSignature: Electronically signed by Lucy Campbell RN on 5/6/25 at 3:58 PM CDT    **SHARE this note so that the co-signing nurse can place an eSignature**    Nurse 2 eSignature: Electronically signed by Beckie Maier RN on 5/6/25 at 4:37 PM CDT    Catheter removed intact.

## 2025-05-06 NOTE — PROGRESS NOTES
Pulmonary and Critical Care Progress note.    [facility] MELISA Vinson    MRN# [id number]    Acct# [id number]  5/6/2025   4:52 PM CDT    Referring Provider:Corey Kelley MD      Chief Complaint: Respiratory failure on mechanical ventilation    HPI: Patient continues to be intubated on mechanical ventilation.  He is obtunded.    Medications:  The following medications were reviewed and adjustments made when necessary.    polyvinyl alcohol, 2 drop, Both Eyes, BID    azithromycin, 500 mg, IntraVENous, Q24H    cefTRIAXone (ROCEPHIN) IV, 1,000 mg, IntraVENous, Q24H    clopidogrel, 75 mg, Oral, Daily    insulin glargine, 0.15 Units/kg, SubCUTAneous, Nightly    pantoprazole (PROTONIX) 40 mg in sodium chloride (PF) 0.9 % 10 mL injection, 40 mg, IntraVENous, Daily    albumin human 25%, 25 g, IntraVENous, Q6H    epoetin arianna-epbx, 10,000 Units, SubCUTAneous, Once per day on Tuesday Thursday Saturday    aspirin, 81 mg, Oral, Daily    atorvastatin, 40 mg, Oral, Nightly    ferrous sulfate, 650 mg, Oral, Daily with breakfast    furosemide, 40 mg, Oral, Daily    montelukast, 10 mg, Oral, Daily    insulin lispro, 0-4 Units, SubCUTAneous, 4x Daily AC & HS         Physical Exam:  Vitals:    05/06/25 1400 05/06/25 1447 05/06/25 1500 05/06/25 1600   BP:       Pulse: 58 59 60 60   Resp: 22 22 22 22   Temp:    99 °F (37.2 °C)   TempSrc:    Rectal   SpO2: (!) 80% (!) 88% 90% (!) 89%   Weight:       Height:          Intake/Output Summary (Last 24 hours) at 5/6/2025 1652  Last data filed at 5/6/2025 1600  Gross per 24 hour   Intake 2581.73 ml   Output 2347 ml   Net 234.73 ml       General appearance: Middle-aged male who is intubated on mechanical ventilation obtunded   HEENT:normocephalic atraumatic  Heart:S1S2 no murmurs  Lungs:diminished bilaterally no rubs or tenderness or dullness to percussion  Abdomen:Soft non tender no organomegaly  Extremities: He does have acrocyanosis in the upper and lower extremities  Neuro:  8.9   < > 7.9*  --    LACTA 18.2*   < > 3.0* 2.5*   INR 3.08*   < > 4.50*  --    DDIMER >20.00*  --   --   --     < > = values in this interval not displayed.     Microbiology and Cultures   Recent Labs     05/05/25  1219   BC No Growth to date.  Any change in status will be called.         Radiographs reviewed:     XR CHEST PORTABLE  Result Date: 5/6/2025   1.  Stable chest.    ______________________________________ Electronically signed by: BRYAN FINN M.D. Date:     05/06/2025 Time:    06:52     XR CHEST PORTABLE  Result Date: 5/5/2025   1.  Tubes and lines as above. 2. Stable cardiomegaly and trace bilateral pleural effusions. 3. Interval improvement in infiltrates of the left mid lung. 4. Bibasilar groundglass, stable on the left but worse on the right. Consider atelectasis and pulmonary edema.    ______________________________________ Electronically signed by: BRYAN FINN M.D. Date:     05/05/2025 Time:    06:31     XR CHEST PORTABLE  Result Date: 5/4/2025  No definite radiopaque foreign bodies are seen within the thorax.  Probable atelectasis seen in the left mid and left lower lung.  The tip of the NG tube is seen in the distal esophagus. The NG tube should be advanced further into the stomach by approximately 6 inches.   ______________________________________ Electronically signed by: VIDHI MCNEILL M.D. Date:     05/04/2025 Time:    10:04     XR CHEST PORTABLE  Result Date: 5/4/2025   Shallow positioning of the orogastric tube with tip in the distal esophagus. Recommend advancing.  Appropriately positioned endotracheal tube.  Stable small left pleural effusion. Stable atelectasis versus infiltrate in the left lung.     ______________________________________ Electronically signed by: SANDI NEWMAN M.D. Date:     05/04/2025 Time:    07:11     XR CHEST PORTABLE  Result Date: 5/3/2025  Small left pleural effusion with left mid and lower lung zone atelectasis and/or consolidation.  Cardiomegaly.

## 2025-05-06 NOTE — PLAN OF CARE
Problem: Chronic Conditions and Co-morbidities  Goal: Patient's chronic conditions and co-morbidity symptoms are monitored and maintained or improved  Outcome: Progressing     Problem: Discharge Planning  Goal: Discharge to home or other facility with appropriate resources  Outcome: Progressing     Problem: ABCDS Injury Assessment  Goal: Absence of physical injury  Outcome: Progressing     Problem: Safety - Adult  Goal: Free from fall injury  Outcome: Progressing     Problem: Skin/Tissue Integrity  Goal: Skin integrity remains intact  Description: 1.  Monitor for areas of redness and/or skin breakdown2.  Assess vascular access sites hourly3.  Every 4-6 hours minimum:  Change oxygen saturation probe site4.  Every 4-6 hours:  If on nasal continuous positive airway pressure, respiratory therapy assess nares and determine need for appliance change or resting period  Outcome: Progressing     Problem: Pain  Goal: Verbalizes/displays adequate comfort level or baseline comfort level  Outcome: Progressing     Problem: Nutrition Deficit:  Goal: Optimize nutritional status  Outcome: Not Progressing

## 2025-05-06 NOTE — CARE COORDINATION
Case Management Assessment  Initial Evaluation    Date/Time of Evaluation: 5/6/2025 11:35 AM  Assessment Completed by: SAMEER SPRING    If patient is discharged prior to next notation, then this note serves as note for discharge by case management.    Patient Name: Casimiro Vinson                   YOB: 1965  Diagnosis: Shortness of breath [R06.02]  Elevated troponin [R79.89]  ESRD on dialysis (HCC) [N18.6, Z99.2]  ESRD needing dialysis (HCC) [N18.6, Z99.2]                   Date / Time: 5/3/2025  6:31 AM    Patient Admission Status: Inpatient   Readmission Risk (Low < 19, Mod (19-27), High > 27): Readmission Risk Score: 20.6    Current PCP: Abdi Lozano, DO  PCP verified by CM? (P) Yes    Chart Reviewed: Yes      History Provided by: (P) Patient  Patient Orientation: (P) Alert and Oriented, Person, Place, Situation, Self    Patient Cognition: (P) Alert    Hospitalization in the last 30 days (Readmission):  No    If yes, Readmission Assessment in CM Navigator will be completed.    Advance Directives:      Code Status: Full Code   Patient's Primary Decision Maker is: (P) Legal Next of Kin      Discharge Planning:    Patient lives with: (P) Spouse/Significant Other Type of Home: (P) House  Primary Care Giver: (P) Self  Patient Support Systems include: (P) Spouse/Significant Other   Current Financial resources: (P) Medicare  Current community resources: (P) None  Current services prior to admission: (P) Other (Comment) (dialysis)            Current DME:              Type of Home Care services:  (P) None    ADLS  Prior functional level: (P) Independent in ADLs/IADLs  Current functional level: (P) Independent in ADLs/IADLs    PT AM-PAC:   /24  OT AM-PAC:   /24    Family can provide assistance at DC: (P) Yes  Would you like Case Management to discuss the discharge plan with any other family members/significant others, and if so, who? (P) Yes  Plans to Return to Present Housing: (P) Yes  Other

## 2025-05-06 NOTE — PROGRESS NOTES
POOJA calculated. CO 12.8, CI 5.9    Electronically signed by Crow Lozano RN on 5/6/2025 at 6:31 AM

## 2025-05-06 NOTE — PROGRESS NOTES
Hospitalist Progress Note  [facility]     Patient: Casimiro Vinson  : 1965  MRN: [id number]  Code Status: Full Code    Hospital Day: 3   Date of Service: 2025    Subjective:   Patient seen and examined.  Intubated.  Not requiring sedation.    Past Medical History:   Diagnosis Date    Diabetes (HCC)     Dialysis patient     ESRD (end stage renal disease) ([facility])        Past Surgical History:   Procedure Laterality Date    CARDIAC SURGERY N/A 2025    PERICARDIAL WINDOW CREATION OPEN WITH PERFUSION performed by Crow Soriano MD at [facility] OR    CARDIAC SURGERY  2025    PLACEMENT OF 5.5 IMPELLA DEVICE performed by Crow Soriano MD at [facility] OR    CORONARY ARTERY BYPASS GRAFT      TRANSESOPHAGEAL ECHOCARDIOGRAM  2025    TRANSESOPHAGEAL ECHOCARDIOGRAM performed by Crow Soriano MD at [facility] OR    VASCULAR SURGERY  2023    Ultrasound guided cannulation of right internal jugular vein AND  Placement of right internal jugular vein tunneled dialysis catheter (Bard Equistream XK 23 cm tip to cuff). VI       History reviewed. No pertinent family history.    Social History     Socioeconomic History    Marital status:      Spouse name: Not on file    Number of children: Not on file    Years of education: Not on file    Highest education level: Not on file   Occupational History    Not on file   Tobacco Use    Smoking status: Former     Current packs/day: 0.00     Average packs/day: 0.5 packs/day for 14.0 years (7.0 ttl pk-yrs)     Types: Cigarettes     Start date: 1984     Quit date: 1998     Years since quittin.3     Passive exposure: Past    Smokeless tobacco: Current     Types: Chew, Snuff   Vaping Use    Vaping status: Not on file   Substance and Sexual Activity    Alcohol use: Never    Drug use: Never    Sexual activity: Not on file   Other Topics Concern    Not on file   Social History Narrative    Not on file     Social Drivers of Health     Financial Resource

## 2025-05-06 NOTE — PROGRESS NOTES
Progress Note    2025 7:51 AM          POD#   2    Subjective:  Mr. Vinson remains intubated and shows only occasional signs of responsiveness.  He has been receiving versed (6 mg in the last day) due to myoclonic activity.  PULSE OXIMETRY RANGE: SpO2  Av.8 %  Min: 70 %  Max: 94 %  SUPPLEMENTAL O2:     Vital Signs: /60   Pulse 55   Temp 96.8 °F (36 °C) (Rectal)   Resp 26   Ht 1.778 m (5' 10\")   Wt 97.5 kg (215 lb)   SpO2 (!) 89%   BMI 30.85 kg/m²    Temperature Range:   Temp: 96.8 °F (36 °C)   Temp  Av.2 °F (36.8 °C)  Min: 96.8 °F (36 °C)  Max: 102 °F (38.9 °C)                   Rhythm: normal sinus rhythm    Labs:   ABG:    Lab Results   Component Value Date/Time    PHART 7.390 2025 02:12 AM    PO2ART 187.0 2025 02:12 AM    XWR7PTH 38.0 2025 02:12 AM    N6ZIUUCT 97.5 2025 02:12 AM    LQR9TTZ 18 2025 09:29 AM    KJZ0RKR 23.0 2025 02:12 AM    BEART -1.7 2025 02:12 AM     CBC:   Recent Labs     25  0428   WBC 9.0 8.0 5.9   HGB 10.2* 9.8* 9.0*   HCT 29.6* 28.8* 26.9*   MCV 88.9 88.9 91.2   PLT 46* 47* 43*     BMP:   Recent Labs     25  02125  0428    139  --  139   K 4.4 4.2 4.2 4.4    102  --  104   CO2 20* 20*  --  21*   BUN 33* 29*  --  24*   CREATININE 6.2* 5.7*  --  4.5*     PT/INR:   Recent Labs     25  1022 25  0306 25  0428   PROTIME 27.8* 35.2* 41.6*   INR 2.65* 3.61* 4.50*     APTT:   Recent Labs     25  1022   APTT 54.9*     Chest X-Ray:  no effusions or infiltrates   CT:  I/O last 3 completed shifts:  In: 8018.4 [I.V.:6871; IV Piggyback:1147.4]  Out: 2767 [Urine:81; Chest Tube:1130]      I/O last 3 completed shifts:  In: 8018.4 [I.V.:6871; IV Piggyback:1147.4]  Out: 2767 [Urine:81; Chest Tube:1130]  Scheduled Meds:    polyvinyl alcohol  2 drop Both Eyes BID    azithromycin  500 mg IntraVENous Q24H    cefTRIAXone

## 2025-05-06 NOTE — DIALYSIS
[facility]  DIALYSIS TREATMENT SUMMARY      Note: Consult with the attending physician for patient treatment orders, this document is not a physician order.      Patient Information   Patient Casimiro Vinson   Date of Birth February 03, 1965   Chart Number [id number]   Location [facility]   Location MRN [id number]   Gender Male   SSN (last 4)    Treatment Information   Treatment Type CRRT   Treatment Id [id number]   Start Time May 05, 2025 13:52   End Time May 06, 2025 14:00   Acutal Duration 24:08   Facility Information   Facility Information   [facility]   Treatment Start Date 05/05/2025   Treatment Start Time 13:52   Ordering MD DR. FABIAN   Account/Finance Number [id number]   Admission Status InPatient   Location ICU   Room #    Bed #    Stat Treatment Yes   Staff Report Received Yes   Hospital Nurse KORTNEY SNYDER RN   Time 13:15   Notes S/P CODE BLUE, PERICARDIAL TAMPONADE WITH EFFUSION, POSSIBLE ANOXIC BRAIN INJURY, HYPOTENSION ON VASOPRESSORS, ACUTE RESP. FAILURE; ESRD; CHEST TUBES;   Patient Type Chronic dialysis patient with diagnosis of ESRD   Patient Chronic Unit [facility]   Patient Home Unit [address] [place] [address]   Code Status Full Code   Diagnosis   Diagnosis PERICARDIAL EFF; ESRD; HYPERKALEMIA; RESP FAILURE   Isolation Information   Isolation Required? N   Completed by   General Tx information Entered by Cecil Rodriguez RN   Signing   Signed By Cecil Rodriguez RN   Pre Focused Assessment   Dialysis Access   Access Type Central Venous Catheter   Central Venous Catheter   Access Type Catheter - Tunneled   Access Location Chest Wall - R   Attending Nephrologist DR. FABIAN   1st Use Catheter Verified by X-Ray   Catheter Care Completed per Policy Yes   Type of Dressing Film Biopatch/CHG   Dressing Changed No   Date Changed 05/03/2025   If No select Reason Other   Notes RIGHT IJ PERMCATH, PATENT X 2 PORTS, SITE CLEAR   CVC Line Education

## 2025-05-06 NOTE — PROGRESS NOTES
Fentanyl drip, 50 mL, wasted with Lucy LANDAVERDE    Electronically signed by Beckie Maier RN on 5/6/2025 at 8:22 AM  Electronically signed by Lucy Campbell RN on 5/6/2025 at 8:33 AM

## 2025-05-06 NOTE — PROGRESS NOTES
[facility] Neurology Progress Note      Patient:   Casimiro Vinson  MR#:    [id number]   Room:    0142/142-01   YOB: 1965  Date of Progress Note: 5/6/2025  Time of Note                           1:23 PM  Consulting Physician:  Chong Sales DO  Attending Physician:  Corey Kelley MD      INTERVAL HISTORY:  No acute events, remains unchanged.     REVIEW OF SYSTEMS:  Limited given ASM.     PHYSICAL EXAM:    Constitutional -   BP (!) 92/50   Pulse 58   Temp 97.6 °F (36.4 °C) (Rectal)   Resp 22   Ht 1.778 m (5' 10\")   Wt 113.4 kg (250 lb)   SpO2 (!) 83%   BMI 35.87 kg/m²   General appearance: Intubated, obtunded   EYES -   Conjunctiva - chemosis   Pupillary exam as below, see CN exam in the neurologic exam  ENT-    No scars, masses, or lesions over external nose or ears  Musculoskeletal -   No significant wasting of muscles noted  Gait as below, see gait exam in the neurologic exam  Muscle strength, tone, stability as below see the motor exam in the neurologic exam.   No bony deformities  Skin -   Warm, dry, and intact to inspection and palpation.    No rash, erythema, or pallor        NEUROLOGICAL EXAM     Mental status    [] Awake, alert, oriented   [] Affect attention and concentration appear appropriate  [] Recent and remote memory appears unremarkable  [] Speech normal without dysarthria or aphasia, comprehension and repetition intact.   COMMENTS:Unresponsive to voice, noxious stimulation    Cranial Nerves [] No VF deficit to confrontation,  optic discs normal, no papilledema on fundoscopic exam.  [] PERRLA, EOMI, no nystagmus, conjugate eye movements, no ptosis  [] Face symmetric  [] Facial sensation intact  [] Tongue midline no atrophy or fasciculations present  [] Palate midline, hearing to finger rub normal  [] Shoulder shrug and SCM testing normal  COMMENTS:Pupils mid position sluggish, corneal absent, OCR absent, spontaneous respirations noted on the vent    Motor   [] 5/5 strength  05/06/2025     05/06/2025    CO2 21 (L) 05/06/2025    BUN 24 (H) 05/06/2025    CREATININE 4.5 (H) 05/06/2025    GLUCOSE 158 (H) 05/06/2025    CALCIUM 7.9 (L) 05/06/2025    BILITOT 5.9 (H) 05/06/2025    ALKPHOS 167 (H) 05/06/2025    AST 3,085 (H) 05/06/2025     (H) 05/06/2025    LABGLOM 14 (A) 05/06/2025     Lab Results   Component Value Date    INR 4.50 (H) 05/06/2025    INR 3.61 (H) 05/05/2025    INR 2.65 (H) 05/04/2025    PROTIME 41.6 (H) 05/06/2025    PROTIME 35.2 (H) 05/05/2025    PROTIME 27.8 (H) 05/04/2025       RECORD REVIEW:   Previous medical records, medications were reviewed at today's visit.  Nursing/physician notes, imaging, labs and vitals reviewed.          ASSESSMENT:  60 y.o. admitted with dyspnea, pulmonary edema and chest pain.  He was recently hospitalized in [place] and is status post cardiac stenting with coronary artery bypass grafting.  Sudden cardiac arrest noted with an unclear time to return of spontaneous circulation possibly 20 minutes.  He was subsequently diagnosed with pericardial tamponade and was taken for emergent pericardial window with Impella placement.  Patient has remained obtunded, severe renal and hepatic abnormalities noted in labs.  EEG with low voltage slowing, nothing epileptiform, no electrographic seizure activity noted.  Exam unchanged, no improvement. Suspect anoxic brain injury, metabolic encephalopathy.      PLAN:   CT or MRI when able.    Limit sedation, will be able to better prognosticate outcome based on neurologic exam findings at 72 hours post arrest.  Metabolic parameters with severe renal and hepatic abnormalities could cloud the exam. Will follow.    CT surgery, cardiology, pulmonology, nephrology following.     Please feel free to call with any questions.   [phone] (cell phone).      Chong Sales,   Board Certified Neurology

## 2025-05-06 NOTE — PROCEDURES
ADULT INPATIENT ELECTROENCEPHALOGRAM REPORT    Patient:   Casimiro Vinson  MR#:    [id number]  Room #:    INPATIENT  YOB: 1965  Date of Evaluation:  5/6/2025  Primary Physician:     Abdi Lozano DO   Referring Physician:   Chong Sales DO      CLINICAL INFORMATION:     This patient is a 60 y.o. male with a history of cardiac arrest, AMS.     MEDICATIONS:     See MAR.    RECORDING CONDITIONS:     This EEG was performed utilizing standard International 10-20 System of electrode placement, with additional channels monitored for eye movement. One channel electrocardiogram was monitored. Data was obtained, stored, and interpreted according to ACNS guidelines (J Clin Neurophysiol 2006;23(2):) utilizing referential montage recording, with reformatting to longitudinal, transverse bipolar, and referential montages as necessary for interpretation, along with the digital/automated EEG analysis. Patient tolerated entire procedure well. Photic stimulation and hyperventilation were utilized as activation procedures unless otherwise specified below.     E.E.G. DESCRIPTION:     There is diffuse lower voltage slowing seen throughout the recording.  No overt epileptiform abnormalities or electrographic seizure activity was seen.  Intermixed muscle artifact was noted which somewhat limited the recording. Drowsiness and sleep were not demonstrated. Hyperventilation was not performed. Photic stimulation was performed and had little change on the recording.     EEG INTERPRETATION:    Abnormal EEG due to diffuse lower voltage slowing of the background rhythm.  No overt epileptiform abnormalities or electrographic seizure activity was seen.      CLINICAL CORRELATION:     This EEG is suggestive of a severe encephalopathy, nonspecific to etiology.       Chong Sales DO  Board Certified Neurologist    Date reported: 5/6/2025  Date signed: 5/6/2025

## 2025-05-06 NOTE — PLAN OF CARE
Problem: Nutrition Deficit:  Goal: Optimize nutritional status  Recent Flowsheet Documentation  Taken 5/6/2025 1011 by Kathya Rice, MS, RD, LD  Nutrient intake appropriate for improving, restoring, or maintaining nutritional needs: Assess nutritional status and recommend course of action  5/6/2025 0832 by Lucy Campbell RN  Outcome: Not Progressing  5/6/2025 0448 by Crow Lozano RN  Outcome: Not Progressing     Problem: Chronic Conditions and Co-morbidities  Goal: Patient's chronic conditions and co-morbidity symptoms are monitored and maintained or improved  5/6/2025 0832 by Lucy Campbell RN  Outcome: Not Progressing  Flowsheets (Taken 5/6/2025 0800)  Care Plan - Patient's Chronic Conditions and Co-Morbidity Symptoms are Monitored and Maintained or Improved: Monitor and assess patient's chronic conditions and comorbid symptoms for stability, deterioration, or improvement  5/6/2025 0448 by Crow Lozano RN  Outcome: Progressing     Problem: Discharge Planning  Goal: Discharge to home or other facility with appropriate resources  5/6/2025 0832 by Lucy Campbell RN  Outcome: Not Progressing  Flowsheets (Taken 5/6/2025 0800)  Discharge to home or other facility with appropriate resources: Identify barriers to discharge with patient and caregiver  5/6/2025 0448 by Crow Lozano RN  Outcome: Progressing     Problem: Nutrition Deficit:  Goal: Optimize nutritional status  Recent Flowsheet Documentation  Taken 5/6/2025 1011 by Kathya Rice, MS, RD, LD  Nutrient intake appropriate for improving, restoring, or maintaining nutritional needs: Assess nutritional status and recommend course of action  5/6/2025 0832 by Lucy Campbell RN  Outcome: Not Progressing  5/6/2025 0448 by Crow Lozano RN  Outcome: Not Progressing

## 2025-05-06 NOTE — PROGRESS NOTES
Estephania calculated. CO 8.1, CI 3.4.   Notified Dr. Soriano and moved Impella to P-3 per verbal order.   Order to recheck in 4 hours

## 2025-05-06 NOTE — PROGRESS NOTES
mouth 2 times daily  Patient taking differently: Take 0.5 tablets by mouth 2 times daily 8/16/23   Cordell Flores MD   montelukast (SINGULAIR) 10 MG tablet Take 1 tablet by mouth daily    Shubham Solitario MD   furosemide (LASIX) 40 MG tablet Take 1 tablet by mouth daily    Shubham Solitario MD   insulin 70-30 (HUMULIN;NOVOLIN) (70-30) 100 UNIT per ML injection vial Inject 8 Units into the skin 2 times daily    Shubham Solitario MD        polyvinyl alcohol  2 drop Both Eyes BID    azithromycin  500 mg IntraVENous Q24H    cefTRIAXone (ROCEPHIN) IV  1,000 mg IntraVENous Q24H    clopidogrel  75 mg Oral Daily    insulin glargine  0.15 Units/kg SubCUTAneous Nightly    pantoprazole (PROTONIX) 40 mg in sodium chloride (PF) 0.9 % 10 mL injection  40 mg IntraVENous Daily    albumin human 25%  25 g IntraVENous Q6H    epoetin arianna-epbx  10,000 Units SubCUTAneous Once per day on Tuesday Thursday Saturday    aspirin  81 mg Oral Daily    atorvastatin  40 mg Oral Nightly    ferrous sulfate  650 mg Oral Daily with breakfast    furosemide  40 mg Oral Daily    montelukast  10 mg Oral Daily    insulin lispro  0-4 Units SubCUTAneous 4x Daily AC & HS       TELEMETRY: Sinus     Physical Exam:      Physical Exam  Constitutional:       Comments: Intubated on mechanical ventilation  No response to tactile or noxious stimulation.   Cardiovascular:      Comments: JVP difficult to discern lying flat  Right TMA with cool tips of both feet and hands and purplish discoloration noted.  No overt murmurs noted  Pulmonary:      Comments: Ventilated with air entry in both lung fields  Abdominal:      Comments: Bowel sounds are diminished  No palpable organomegaly   Skin:     Comments: Cold peripherally but warm more proximally.   Neurological:      Comments: Pupils now bilaterally minimally reactive.                 Lab Data:  CBC:   Recent Labs     05/05/25  1730 05/05/25  2052 05/06/25  0428   WBC 9.0 8.0 5.9   HGB 10.2* 9.8* 9.0*  CHEST  HISTORY:  Postop surgery, check for foreign body heart surgery  TECHNIQUE:  Single frontal view the chest and upper abdomen was obtained.  FINDINGS:  No definite radiopaque foreign bodies are seen within the thorax. Other wires are seen.  Patchy opacities are seen in the left mid and left lower lung. The tip of the NG tube is seen in the distal esophagus.      No definite radiopaque foreign bodies are seen within the thorax.  Probable atelectasis seen in the left mid and left lower lung.  The tip of the NG tube is seen in the distal esophagus. The NG tube should be advanced further into the stomach by approximately 6 inches.   ______________________________________ Electronically signed by: VIDHI MCNEILL M.D. Date:     05/04/2025 Time:    10:04     XR CHEST PORTABLE  Result Date: 5/4/2025  EXAM:  FRONTAL VIEW OF THE CHEST.  HISTORY:  Line/tube placement.  COMPARISON:  Chest radiograph 5/3/2025.  FINDINGS: Endotracheal tube tip is appropriate vision at the lower thoracic portion of the trachea above the cody.  Orogastric tube tip is in the distal esophagus.  Stable mild cardiomegaly.  Small left pleural effusion and opacities in the left mid lung.  No visible pneumothorax.  Tunneled dialysis catheter tip stable at the right atrium.        Shallow positioning of the orogastric tube with tip in the distal esophagus. Recommend advancing.  Appropriately positioned endotracheal tube.  Stable small left pleural effusion. Stable atelectasis versus infiltrate in the left lung.     ______________________________________ Electronically signed by: SANDI NEWMAN M.D. Date:     05/04/2025 Time:    07:11     XR CHEST PORTABLE  Result Date: 5/3/2025  EXAMINATION: AP CHEST RADIOGRAPH.  HISTORY: Chest pain  COMPARISON: None available.  FINDINGS:  There is a right IJ Perma-Cath with tip overlying the right atrium.  Left mid and lower lung zone opacities are seen with left costophrenic angle blunting.  No pneumothorax is

## 2025-05-06 NOTE — PROGRESS NOTES
Nephrology ([facility]) Progress Note    Patient:  Casimiro Vinson  YOB: 1965  Date of Service: 5/6/2025  MRN: [id number]   Acct: [id number]   Primary Care Physician: Abdi Lozano DO  Advance Directive: Full Code  Admit Date: 5/3/2025       Hospital Day: 3  Referring Provider: Corey Kelley MD    Patient independently seen and examined, Chart, Consults, Notes, Operative notes, Labs, Cardiology, and Radiology studies reviewed as available.    Subjective:  Casimiro Vinson is a 60 y.o. male for whom we were consulted for evaluation and treatment of end-stage renal disease.  He is a Monday Wednesday Friday hemodialysis patient.  He developed worsening dyspnea and so presented to the emergency room for evaluation was diagnosed with pulmonary edema and chest pain.  He had recently been admitted to [facility] for cardiac stenting and single-vessel CABG.  He subsequently underwent CODE BLUE with subsequent ROSC.  He was diagnosed with pericardial tamponade and taken for an emergent pericardial window.    Today, patient remains unable to participate in the history and physical examination.  Requiring Jarett-Synephrine, vasopressin for blood pressure support.  Also requiring Impella.  Family not present at the bedside and reviewed with nursing and Dr. Kelley.    Dialysis   Pt was seen on renal replacement therapy and I have personally seen and evaluated the patient and directed the therapy  Modality: CRRT  Access: Catheter  Location: right upper  QB: 300  QD: 3L/h  UF: 60      Allergies:  Levaquin [levofloxacin]    Medicines:  Current Facility-Administered Medications   Medication Dose Route Frequency Provider Last Rate Last Admin    phenylephrine (JARETT-SYNEPHRINE) 100 mg in sodium chloride 0.9 % 250 mL infusion ([id number])   mcg/min IntraVENous Continuous Crow Soriano MD 45 mL/hr at 05/06/25 1000 300 mcg/min at 05/06/25 1000    polyvinyl alcohol (LIQUIFILM  TEARS) 1.4 % ophthalmic solution 2 drop  2 drop Both Eyes BID Bo, MD Crow   2 drop at 05/06/25 0826    prismaSol BGK 2/3.5 dialysis solution   Dialysis Continuous Gonzalo Lanier MD        heparin (porcine) injection 1,100-1,900 Units  1,100-1,900 Units IntraCATHeter PRN Gonzalo Lanier MD        And    heparin (porcine) injection 1,100-1,900 Units  1,100-1,900 Units IntraCATHeter PRN Gonzalo Lanier MD        azithromycin (ZITHROMAX) 500 mg in sodium chloride 0.9 % 250 mL IVPB ([id number])  500 mg IntraVENous Q24H Chinle, MD Crow   Stopped at 05/06/25 0440    cefTRIAXone (ROCEPHIN) 1,000 mg in sterile water 10 mL IV syringe  1,000 mg IntraVENous Q24H Bo, MD Crow   1,000 mg at 05/06/25 0323    EPINEPHrine 5 mg in sodium chloride 0.9 % 250 mL infusion  1-20 mcg/min IntraVENous Continuous ChinleCrow milton MD   Stopped at 05/05/25 0832    dextrose 50 % IV solution  25 g IntraVENous PRN Crow Soriano MD   25 g at 05/05/25 0542    VASOpressin 20 Units in sodium chloride 0.9 % 100 mL infusion  0.03 Units/min IntraVENous Continuous Crow Soriano MD 9 mL/hr at 05/06/25 1000 0.03 Units/min at 05/06/25 1000    DOBUTamine (DOBUTREX) 500 mg in dextrose 5 % 250 mL infusion  2.5-10 mcg/kg/min IntraVENous Continuous Crow Soriano MD   Stopped at 05/05/25 0520    phenylephrine (DIANA-SYNEPHRINE) injection 0.2 mg  200 mcg IntraVENous Once PRN Crow Soriano MD        sodium bicarbonate 12.5 mEq in dextrose 5 % 500 mL infusion (FOR IMPELLA PURGE)   IntraCATHeter Continuous Crow Soriano MD   New Bag at 05/04/25 1103    norepinephrine (LEVOPHED) 16 mg in sodium chloride 0.9 % 250 mL infusion (premix)  1-100 mcg/min IntraVENous Continuous Crow Soriano MD   Stopped at 05/06/25 0630    sodium bicarbonate 100 mEq in dextrose 5 % 1,000 mL infusion   IntraVENous Continuous ChinleCrow milton MD   Stopped at 05/05/25 1400    protamine injection 50 mg  50 mg IntraVENous Once PRN Crow Soriano MD

## 2025-05-06 NOTE — PROGRESS NOTES
Impella flow rate decreased to P-4 per Dr. Soriano    Electronically signed by Beckie Maier RN on 5/6/2025 at 10:47 AM

## 2025-05-07 ENCOUNTER — APPOINTMENT (OUTPATIENT)
Dept: GENERAL RADIOLOGY | Age: 60
DRG: 001 | End: 2025-05-07
Payer: MEDICARE

## 2025-05-07 ENCOUNTER — APPOINTMENT (OUTPATIENT)
Dept: CT IMAGING | Age: 60
DRG: 001 | End: 2025-05-07
Payer: MEDICARE

## 2025-05-07 LAB
ALBUMIN SERPL-MCNC: 3.6 G/DL (ref 3.5–5.2)
ALLENS TEST: ABNORMAL
ALLENS TEST: ABNORMAL
ALP SERPL-CCNC: 143 U/L (ref 40–129)
ALT SERPL-CCNC: 221 U/L (ref 10–50)
ANION GAP SERPL CALCULATED.3IONS-SCNC: 15 MMOL/L (ref 8–16)
ANISOCYTOSIS BLD QL SMEAR: ABNORMAL
AST SERPL-CCNC: 828 U/L (ref 10–50)
BASE EXCESS ARTERIAL: -3.3 MMOL/L (ref -2–2)
BASE EXCESS ARTERIAL: -3.3 MMOL/L (ref -2–2)
BASE EXCESS ARTERIAL: -5 MMOL/L (ref -2–2)
BASE EXCESS ARTERIAL: -5.7 MMOL/L (ref -2–2)
BASE EXCESS VENOUS: -3 MMOL/L
BASE EXCESS VENOUS: -5 MMOL/L
BASOPHILS # BLD: 0 K/UL (ref 0–0.2)
BASOPHILS NFR BLD: 0 % (ref 0–1)
BILIRUB SERPL-MCNC: 6.8 MG/DL (ref 0.2–1.2)
BLOOD BANK DISPENSE STATUS: NORMAL
BLOOD BANK PRODUCT CODE: NORMAL
BPU ID: NORMAL
BUN SERPL-MCNC: 27 MG/DL (ref 8–23)
CALCIUM SERPL-MCNC: 8.1 MG/DL (ref 8.8–10.2)
CARBOXYHEMOGLOBIN ARTERIAL: 2.1 % (ref 0–5)
CARBOXYHEMOGLOBIN ARTERIAL: 2.1 % (ref 0–5)
CARBOXYHEMOGLOBIN ARTERIAL: 2.3 % (ref 0–5)
CARBOXYHEMOGLOBIN ARTERIAL: 2.5 % (ref 0–5)
CARBOXYHEMOGLOBIN: 1.9 %
CARBOXYHEMOGLOBIN: 2 %
CHLORIDE SERPL-SCNC: 104 MMOL/L (ref 98–107)
CO2 SERPL-SCNC: 20 MMOL/L (ref 22–29)
CREAT SERPL-MCNC: 4.9 MG/DL (ref 0.7–1.2)
DESCRIPTION BLOOD BANK: NORMAL
EOSINOPHIL # BLD: 0.48 K/UL (ref 0–0.6)
EOSINOPHIL NFR BLD: 7 % (ref 0–5)
ERYTHROCYTE [DISTWIDTH] IN BLOOD BY AUTOMATED COUNT: 16.9 % (ref 11.5–14.5)
FIBRINOGEN PPP-MCNC: 191 MG/DL (ref 238–505)
FIO2: 100 %
FIO2: 50 %
FIO2: 60 %
FIO2: 60 %
GLUCOSE BLD-MCNC: 124 MG/DL (ref 70–99)
GLUCOSE BLD-MCNC: 131 MG/DL (ref 70–99)
GLUCOSE BLD-MCNC: 96 MG/DL (ref 70–99)
GLUCOSE BLD-MCNC: <25 MG/DL (ref 70–99)
GLUCOSE SERPL-MCNC: 146 MG/DL (ref 70–99)
HCO3 ARTERIAL: 21.1 MMOL/L (ref 22–26)
HCO3 ARTERIAL: 21.2 MMOL/L (ref 22–26)
HCO3 ARTERIAL: 22.1 MMOL/L (ref 22–26)
HCO3 ARTERIAL: 22.1 MMOL/L (ref 22–26)
HCO3 VENOUS: 22 MMOL/L (ref 23–29)
HCO3 VENOUS: 23 MMOL/L (ref 23–29)
HCT VFR BLD AUTO: 26.1 % (ref 42–52)
HCT VFR BLD AUTO: 30.5 % (ref 42–52)
HEMOGLOBIN, ART, EXTENDED: 10.9 G/DL (ref 14–18)
HEMOGLOBIN, ART, EXTENDED: 9.2 G/DL (ref 14–18)
HGB BLD-MCNC: 8.6 G/DL (ref 14–18)
HGB BLD-MCNC: 9.9 G/DL (ref 14–18)
IMM GRANULOCYTES # BLD: 0.1 K/UL
INR PPP: 2.88 (ref 0.88–1.18)
LACTATE BLDV-SCNC: 2.9 MMOL/L (ref 0.5–1.9)
LYMPHOCYTES # BLD: 0.8 K/UL (ref 1.1–4.5)
LYMPHOCYTES NFR BLD: 8 % (ref 20–40)
MAGNESIUM SERPL-MCNC: 2.1 MG/DL (ref 1.6–2.4)
MCH RBC QN AUTO: 30.7 PG (ref 27–31)
MCHC RBC AUTO-ENTMCNC: 33 G/DL (ref 33–37)
MCV RBC AUTO: 93.2 FL (ref 80–94)
MECHANICAL RATE IN BPM: 22
METHEMOGLOBIN ARTERIAL: 0.7 %
METHEMOGLOBIN ARTERIAL: 1 %
METHEMOGLOBIN ARTERIAL: 1 %
METHEMOGLOBIN ARTERIAL: 1.1 %
METHEMOGLOBIN VENOUS: 1.3 %
METHEMOGLOBIN VENOUS: 1.3 %
MODE: ABNORMAL
MONOCYTES # BLD: 0.3 K/UL (ref 0–0.9)
MONOCYTES NFR BLD: 5 % (ref 0–10)
NEUTROPHILS # BLD: 5.3 K/UL (ref 1.5–7.5)
NEUTS BAND NFR BLD MANUAL: 27 % (ref 0–5)
NEUTS SEG NFR BLD: 50 % (ref 50–65)
O2 CONTENT ARTERIAL: 11.5 ML/DL
O2 CONTENT ARTERIAL: 12.6 ML/DL
O2 CONTENT ARTERIAL: 12.7 ML/DL
O2 CONTENT ARTERIAL: 13.7 ML/DL
O2 CONTENT, VEN: 8 ML/DL
O2 CONTENT, VEN: 9 ML/DL
O2 SAT, ARTERIAL: 88.7 %
O2 SAT, ARTERIAL: 89.3 %
O2 SAT, ARTERIAL: 96.2 %
O2 SAT, ARTERIAL: 96.7 %
O2 SAT, VEN: 62 %
O2 SAT, VEN: 73 %
O2 THERAPY: ABNORMAL
PCO2 ARTERIAL: 40 MMHG (ref 35–45)
PCO2 ARTERIAL: 40 MMHG (ref 35–45)
PCO2 ARTERIAL: 43 MMHG (ref 35–45)
PCO2 ARTERIAL: 46 MMHG (ref 35–45)
PCO2 VENOUS: 44 MMHG (ref 40–50)
PCO2 VENOUS: 48 MMHG (ref 40–50)
PERFORMED ON: ABNORMAL
PERFORMED ON: NORMAL
PH ARTERIAL: 7.27 (ref 7.35–7.45)
PH ARTERIAL: 7.3 (ref 7.35–7.45)
PH ARTERIAL: 7.35 (ref 7.35–7.45)
PH ARTERIAL: 7.35 (ref 7.35–7.45)
PH VENOUS: 7.27 (ref 7.35–7.45)
PH VENOUS: 7.33 (ref 7.35–7.45)
PLATELET # BLD AUTO: 36 K/UL (ref 130–400)
PLATELET SLIDE REVIEW: ABNORMAL
PMV BLD AUTO: 12.7 FL (ref 9.4–12.4)
PO2 ARTERIAL: 103 MMHG (ref 80–100)
PO2 ARTERIAL: 60 MMHG (ref 80–100)
PO2 ARTERIAL: 60 MMHG (ref 80–100)
PO2 ARTERIAL: 94 MMHG (ref 80–100)
PO2 VENOUS: 37 MMHG
PO2 VENOUS: 42 MMHG
POSITIVE END EXP PRESS: 5
POSITIVE END EXP PRESS: 7
POTASSIUM BLD-SCNC: 4.1 MMOL/L
POTASSIUM BLD-SCNC: 4.5 MMOL/L
POTASSIUM BLD-SCNC: 4.7 MMOL/L
POTASSIUM BLD-SCNC: 4.7 MMOL/L
POTASSIUM SERPL-SCNC: 4.8 MMOL/L (ref 3.5–5.1)
PROT SERPL-MCNC: 4.6 G/DL (ref 6.4–8.3)
PROTHROMBIN TIME: 29.6 SEC (ref 12–14.6)
RBC # BLD AUTO: 2.8 M/UL (ref 4.7–6.1)
SAMPLE SOURCE: ABNORMAL
SODIUM SERPL-SCNC: 139 MMOL/L (ref 136–145)
VARIANT LYMPHS NFR BLD: 3 % (ref 0–8)
VT MECHANICAL: 450 %
WBC # BLD AUTO: 6.9 K/UL (ref 4.8–10.8)

## 2025-05-07 PROCEDURE — 82800 BLOOD PH: CPT

## 2025-05-07 PROCEDURE — 80053 COMPREHEN METABOLIC PANEL: CPT

## 2025-05-07 PROCEDURE — 2580000003 HC RX 258: Performed by: SURGERY

## 2025-05-07 PROCEDURE — 99024 POSTOP FOLLOW-UP VISIT: CPT | Performed by: SURGERY

## 2025-05-07 PROCEDURE — 99233 SBSQ HOSP IP/OBS HIGH 50: CPT | Performed by: INTERNAL MEDICINE

## 2025-05-07 PROCEDURE — 83735 ASSAY OF MAGNESIUM: CPT

## 2025-05-07 PROCEDURE — 94003 VENT MGMT INPAT SUBQ DAY: CPT

## 2025-05-07 PROCEDURE — 85610 PROTHROMBIN TIME: CPT

## 2025-05-07 PROCEDURE — 83605 ASSAY OF LACTIC ACID: CPT

## 2025-05-07 PROCEDURE — P9047 ALBUMIN (HUMAN), 25%, 50ML: HCPCS | Performed by: SURGERY

## 2025-05-07 PROCEDURE — 30233K1 TRANSFUSION OF NONAUTOLOGOUS FROZEN PLASMA INTO PERIPHERAL VEIN, PERCUTANEOUS APPROACH: ICD-10-PCS | Performed by: SURGERY

## 2025-05-07 PROCEDURE — 2700000000 HC OXYGEN THERAPY PER DAY

## 2025-05-07 PROCEDURE — 6360000002 HC RX W HCPCS: Performed by: INTERNAL MEDICINE

## 2025-05-07 PROCEDURE — 94760 N-INVAS EAR/PLS OXIMETRY 1: CPT

## 2025-05-07 PROCEDURE — 6360000002 HC RX W HCPCS: Performed by: SURGERY

## 2025-05-07 PROCEDURE — 70450 CT HEAD/BRAIN W/O DYE: CPT

## 2025-05-07 PROCEDURE — 85384 FIBRINOGEN ACTIVITY: CPT

## 2025-05-07 PROCEDURE — 85018 HEMOGLOBIN: CPT

## 2025-05-07 PROCEDURE — 71045 X-RAY EXAM CHEST 1 VIEW: CPT

## 2025-05-07 PROCEDURE — 36430 TRANSFUSION BLD/BLD COMPNT: CPT

## 2025-05-07 PROCEDURE — 85025 COMPLETE CBC W/AUTO DIFF WBC: CPT

## 2025-05-07 PROCEDURE — 82962 GLUCOSE BLOOD TEST: CPT

## 2025-05-07 PROCEDURE — 8090000000 HC CONTINUOUS VENOVENOUS HEMOFIL

## 2025-05-07 PROCEDURE — 5A12012 PERFORMANCE OF CARDIAC OUTPUT, SINGLE, MANUAL: ICD-10-PCS | Performed by: SURGERY

## 2025-05-07 PROCEDURE — 82803 BLOOD GASES ANY COMBINATION: CPT

## 2025-05-07 PROCEDURE — 2580000003 HC RX 258: Performed by: INTERNAL MEDICINE

## 2025-05-07 PROCEDURE — 99233 SBSQ HOSP IP/OBS HIGH 50: CPT | Performed by: PSYCHIATRY & NEUROLOGY

## 2025-05-07 PROCEDURE — 37799 UNLISTED PX VASCULAR SURGERY: CPT

## 2025-05-07 PROCEDURE — 2000000000 HC ICU R&B

## 2025-05-07 PROCEDURE — 2500000003 HC RX 250 WO HCPCS: Performed by: SURGERY

## 2025-05-07 PROCEDURE — 02PA0RZ REMOVAL OF SHORT-TERM EXTERNAL HEART ASSIST SYSTEM FROM HEART, OPEN APPROACH: ICD-10-PCS | Performed by: SURGERY

## 2025-05-07 PROCEDURE — 6370000000 HC RX 637 (ALT 250 FOR IP): Performed by: SURGERY

## 2025-05-07 PROCEDURE — 90945 DIALYSIS ONE EVALUATION: CPT

## 2025-05-07 PROCEDURE — 30233N1 TRANSFUSION OF NONAUTOLOGOUS RED BLOOD CELLS INTO PERIPHERAL VEIN, PERCUTANEOUS APPROACH: ICD-10-PCS | Performed by: SURGERY

## 2025-05-07 PROCEDURE — 99291 CRITICAL CARE FIRST HOUR: CPT | Performed by: INTERNAL MEDICINE

## 2025-05-07 PROCEDURE — 85014 HEMATOCRIT: CPT

## 2025-05-07 PROCEDURE — 5A1D90Z PERFORMANCE OF URINARY FILTRATION, CONTINUOUS, GREATER THAN 18 HOURS PER DAY: ICD-10-PCS | Performed by: SURGERY

## 2025-05-07 RX ORDER — SODIUM CHLORIDE 9 MG/ML
INJECTION, SOLUTION INTRAVENOUS PRN
Status: DISCONTINUED | OUTPATIENT
Start: 2025-05-07 | End: 2025-05-08 | Stop reason: HOSPADM

## 2025-05-07 RX ORDER — NOREPINEPHRINE BITARTRATE 0.06 MG/ML
1-100 INJECTION, SOLUTION INTRAVENOUS CONTINUOUS
Status: DISCONTINUED | OUTPATIENT
Start: 2025-05-07 | End: 2025-05-08 | Stop reason: HOSPADM

## 2025-05-07 RX ADMIN — AZITHROMYCIN MONOHYDRATE 500 MG: 500 INJECTION, POWDER, LYOPHILIZED, FOR SOLUTION INTRAVENOUS at 02:58

## 2025-05-07 RX ADMIN — SODIUM CHLORIDE, PRESERVATIVE FREE 40 MG: 5 INJECTION INTRAVENOUS at 07:56

## 2025-05-07 RX ADMIN — VASOPRESSIN 0.03 UNITS/MIN: 20 INJECTION INTRAVENOUS at 12:09

## 2025-05-07 RX ADMIN — INSULIN GLARGINE 15 UNITS: 100 INJECTION, SOLUTION SUBCUTANEOUS at 20:58

## 2025-05-07 RX ADMIN — ALBUMIN (HUMAN) 25 G: 0.25 INJECTION, SOLUTION INTRAVENOUS at 10:50

## 2025-05-07 RX ADMIN — WATER 1000 MG: 1 INJECTION INTRAMUSCULAR; INTRAVENOUS; SUBCUTANEOUS at 02:49

## 2025-05-07 RX ADMIN — PHENYLEPHRINE HYDROCHLORIDE 300 MCG/MIN: 10 INJECTION INTRAVENOUS at 18:18

## 2025-05-07 RX ADMIN — SODIUM BICARBONATE: 84 INJECTION, SOLUTION INTRAVENOUS at 23:21

## 2025-05-07 RX ADMIN — ALBUMIN (HUMAN) 25 G: 0.25 INJECTION, SOLUTION INTRAVENOUS at 23:55

## 2025-05-07 RX ADMIN — ALBUMIN (HUMAN) 25 G: 0.25 INJECTION, SOLUTION INTRAVENOUS at 05:25

## 2025-05-07 RX ADMIN — Medication 5 MCG/MIN: at 11:25

## 2025-05-07 RX ADMIN — Medication 45 MCG/MIN: at 16:48

## 2025-05-07 RX ADMIN — POLYVINYL ALCOHOL 2 DROP: 1.4 SOLUTION/ DROPS OPHTHALMIC at 20:58

## 2025-05-07 RX ADMIN — ALBUMIN (HUMAN) 25 G: 0.25 INJECTION, SOLUTION INTRAVENOUS at 17:01

## 2025-05-07 RX ADMIN — POLYVINYL ALCOHOL 2 DROP: 1.4 SOLUTION/ DROPS OPHTHALMIC at 07:56

## 2025-05-07 RX ADMIN — PHENYLEPHRINE HYDROCHLORIDE 300 MCG/MIN: 10 INJECTION INTRAVENOUS at 12:09

## 2025-05-07 ASSESSMENT — PULMONARY FUNCTION TESTS
PIF_VALUE: 43
PIF_VALUE: 34
PIF_VALUE: 31
PIF_VALUE: 31
PIF_VALUE: 30
PIF_VALUE: 32
PIF_VALUE: 27
PIF_VALUE: 30
PIF_VALUE: 33
PIF_VALUE: 33
PIF_VALUE: 38
PIF_VALUE: 31
PIF_VALUE: 34
PIF_VALUE: 34
PIF_VALUE: 35
PIF_VALUE: 35
PIF_VALUE: 33
PIF_VALUE: 32
PIF_VALUE: 35
PIF_VALUE: 32
PIF_VALUE: 36
PIF_VALUE: 39
PIF_VALUE: 25
PIF_VALUE: 38
PIF_VALUE: 34
PIF_VALUE: 38
PIF_VALUE: 37
PIF_VALUE: 37
PIF_VALUE: 36
PIF_VALUE: 36
PIF_VALUE: 29
PIF_VALUE: 32
PIF_VALUE: 36
PIF_VALUE: 30
PIF_VALUE: 30
PIF_VALUE: 33
PIF_VALUE: 33
PIF_VALUE: 32
PIF_VALUE: 36
PIF_VALUE: 34
PIF_VALUE: 30
PIF_VALUE: 42
PIF_VALUE: 34
PIF_VALUE: 34
PIF_VALUE: 38

## 2025-05-07 NOTE — PROGRESS NOTES
[facility] Neurology Progress Note      Patient:   Casimiro Vinson  MR#:    [id number]   Room:    [facility]2/142-01   YOB: 1965  Date of Progress Note: 5/7/2025  Time of Note                           12:52 PM  Consulting Physician:  Chong Sales DO  Attending Physician:  Ryan Garcia DO      INTERVAL HISTORY:  No acute events, remains largely unchanged.     REVIEW OF SYSTEMS:  Limited given ASM.     PHYSICAL EXAM:    Constitutional -   BP (!) 98/46   Pulse 89   Temp 96.9 °F (36.1 °C) (Temporal)   Resp 24   Ht 1.778 m (5' 10\")   Wt 121.7 kg (268 lb 3.2 oz)   SpO2 90%   BMI 38.48 kg/m²   General appearance: Intubated, obtunded   EYES -   Conjunctiva - chemosis   Pupillary exam as below, see CN exam in the neurologic exam  ENT-    No scars, masses, or lesions over external nose or ears  Musculoskeletal -   No significant wasting of muscles noted  Gait as below, see gait exam in the neurologic exam  Muscle strength, tone, stability as below see the motor exam in the neurologic exam.   No bony deformities  Skin -   Warm, dry, and intact to inspection and palpation.    No rash, erythema, or pallor        NEUROLOGICAL EXAM     Mental status    [] Awake, alert, oriented   [] Affect attention and concentration appear appropriate  [] Recent and remote memory appears unremarkable  [] Speech normal without dysarthria or aphasia, comprehension and repetition intact.   COMMENTS:Unresponsive to voice, noxious stimulation    Cranial Nerves [] No VF deficit to confrontation,  optic discs normal, no papilledema on fundoscopic exam.  [] PERRLA, EOMI, no nystagmus, conjugate eye movements, no ptosis  [] Face symmetric  [] Facial sensation intact  [] Tongue midline no atrophy or fasciculations present  [] Palate midline, hearing to finger rub normal  [] Shoulder shrug and SCM testing normal  COMMENTS:Pupils mid position not reactive, corneal absent, OCR absent, spontaneous respirations noted on the vent   left lower lung.  The tip of the NG tube is seen in the distal esophagus. The NG tube should be advanced further into the stomach by approximately 6 inches.   ______________________________________ Electronically signed by: VIDHI MCNEILL M.D. Date:     05/04/2025 Time:    10:04     XR CHEST PORTABLE  Result Date: 5/4/2025  EXAM:  FRONTAL VIEW OF THE CHEST.  HISTORY:  Line/tube placement.  COMPARISON:  Chest radiograph 5/3/2025.  FINDINGS: Endotracheal tube tip is appropriate vision at the lower thoracic portion of the trachea above the cody.  Orogastric tube tip is in the distal esophagus.  Stable mild cardiomegaly.  Small left pleural effusion and opacities in the left mid lung.  No visible pneumothorax.  Tunneled dialysis catheter tip stable at the right atrium.        Shallow positioning of the orogastric tube with tip in the distal esophagus. Recommend advancing.  Appropriately positioned endotracheal tube.  Stable small left pleural effusion. Stable atelectasis versus infiltrate in the left lung.     ______________________________________ Electronically signed by: SANDI NEWMAN M.D. Date:     05/04/2025 Time:    07:11     XR CHEST PORTABLE  Result Date: 5/3/2025  EXAMINATION: AP CHEST RADIOGRAPH.  HISTORY: Chest pain  COMPARISON: None available.  FINDINGS:  There is a right IJ Perma-Cath with tip overlying the right atrium.  Left mid and lower lung zone opacities are seen with left costophrenic angle blunting.  No pneumothorax is identified.The cardiac silhouette is enlarged.      Small left pleural effusion with left mid and lower lung zone atelectasis and/or consolidation.  Cardiomegaly.    ______________________________________ Electronically signed by: NANCY SIDDIQI M.D. Date:     05/03/2025 Time:    07:09       Lab Results   Component Value Date    WBC 6.9 05/07/2025    HGB 8.6 (L) 05/07/2025    HCT 26.1 (L) 05/07/2025    MCV 93.2 05/07/2025    PLT 36 (L) 05/07/2025     Lab Results   Component Value Date

## 2025-05-07 NOTE — PLAN OF CARE
Problem: Nutrition Deficit:  Goal: Optimize nutritional status  5/7/2025 0512 by Crow Lozano RN  Outcome: Not Progressing     Problem: Chronic Conditions and Co-morbidities  Goal: Patient's chronic conditions and co-morbidity symptoms are monitored and maintained or improved  5/7/2025 0512 by Crow Lozano RN  Outcome: Not Progressing     Problem: Discharge Planning  Goal: Discharge to home or other facility with appropriate resources  5/7/2025 0512 by Crow Lozano RN  Outcome: Not Progressing     Problem: Nutrition Deficit:  Goal: Optimize nutritional status  5/7/2025 0512 by Crow Lozano RN  Outcome: Not Progressing

## 2025-05-07 NOTE — PROGRESS NOTES
Cardiology Progress Note Trevor Brown MD      Patient:  Casimiro Vinson  [id number]    Patient Active Problem List    Diagnosis Date Noted    ESRD needing dialysis ([facility]) 05/03/2025     Priority: Low    Acute kidney injury superimposed on chronic kidney disease 08/10/2023     Priority: Low    Shortness of breath 08/10/2023     Priority: Low    Acute nausea with nonbilious vomiting 08/10/2023     Priority: Low    Tobacco use 08/10/2023     Priority: Low    Anemia, unspecified 08/10/2023     Priority: Low    Type II diabetes mellitus (HCC) 08/10/2023     Priority: Low       Admit Date:  5/3/2025    Admission Problem List: Present on Admission:   ESRD needing dialysis (HCC)   Type II diabetes mellitus ([facility])   Shortness of breath      Cardiac Specific Data:  Specialty Problems    None    1.  Coronary artery disease, recent anteroseptal MI, ejection fraction 35-40%, status post PCI 8/15/2023 to mid LAD (3.5 x 18 mm [place]) and co-dominant mid circumflex (4.0 x 8 mm [place]), reported abnormal stress test (prerenal transplant eval), reported restenosis with one-vessel robotic assisted LIMA to LAD CABG 4/17/2025.  2.  Insulin requiring diabetes mellitus.  3.  CKD stage V, initiated on hemodialysis 8/12/2023.  4.  Peripheral arterial disease with right TMA 10/2020.  5.  Active ongoing tobacco chewing.  6.  Hypertension.  7.  Asystolic arrest 5/4/2025 with large pericardial effusion post CABG 4/17/2025, undergoing open sternotomy with pericardial drainage, recurrent arrest, Impella 5.5 LV assist device placement.     Subjective:  Mr. Vinson remains intubated and mechanically ventilated on 100% FiO2, off sedation over the last 24 hours.  Neurologically still unresponsive.  Remains on pressors with Jarett-Synephrine at 300 mics per minute as well as vasopressin.  No overt bleeding noted from incision sites.  EEG with diffuse slow activity with severe encephalopathy.  No epileptiform activity.  Platelets down to 36,000.  Hemoglobin  5/3/2025 with shortness of breath and lightheadedness, subsequent asystolic cardiac arrest with finding of large pericardial effusion with tamponade, undergoing emergent open sternotomy for drainage, subsequent cardiac standstill requiring open cardiac massage, ROSC with placement of Impella 5.5, intubated, unresponsive, on multiple pressors.     1.  Would try and taper off Jarett-Synephrine and vasopressin.  No arrhythmias noted and remains in sinus rhythm.  Impella likely will require removal today with platelets remaining low at 36,000 but stable and no overt bleeding noted.  Remains on intermittent CRRT with no significant urine output noted.  Temporarily held due to clotting.  2.  Neurological status remains uncertain.  Off sedation for the last 24 hours.    Trevor Brown MD, MD 5/7/2025 12:00 PM    Thisdictation was generated by voice recognition computer software.  Although all attempts are made to edit the dictation for accuracy, there may be errors in the transcription that are not intended.

## 2025-05-07 NOTE — PROGRESS NOTES
Estephania calculated   CO 9.8  CI 4.0    Electronically signed by Celestina Cabezas RN on 5/7/2025 at 12:48 PM

## 2025-05-07 NOTE — OP NOTE
[facility]              [address] [place] [address]                            OPERATIVE REPORT      PATIENT NAME: PELON FERREIRA            : 1965  MED REC NO: [id number]                          ROOM: 0142  ACCOUNT NO: [id number]                       ADMIT DATE: 2025  PROVIDER: Crow Soriano MD      DATE OF PROCEDURE:  2025    SURGEON:  Crow Soriano MD    PREOPERATIVE DIAGNOSIS:  Cardiogenic shock, status post Impella 5.5 implant directly into the ascending aorta.    POSTOPERATIVE DIAGNOSIS:  Cardiogenic shock, status post Impella 5.5 implant directly into the ascending aorta.    PROCEDURE:  Removal of Impella 5.5 device and stapling of 10 mm Dacron graft.    ASSISTANT:  Danna Hernandez.    EBL: minimal    HISTORY:  The patient is a 60-year-old gentleman who developed a code blue due to cardiac tamponade 4 days ago.  Ultimately, went to the operating room for pericardial drainage.  The patient had a reduced ejection fraction because of poor cardiac status, and therefore an Impella 5.5 device was placed.  The cardiac performance has improved substantially since then, both in terms of the improved ejection fraction on followup echocardiography and a cardiac output that exceeds 5 to 6 L a minute after weaning the Impella device.  The patient therefore is indicated for removal of the Impella.  Of note, the patient's platelet count is low which is another reason to remove the device out of concern that there was consumption of platelets from Impella.  The family provided informed consent to proceed with the device removal.    DESCRIPTION OF PROCEDURE:  The patient was fully somnolent at the time of the procedure.  No additional sedatives were given.  The Impella was pulled back across the aortic valve fully into the aorta and then turned to P0.  The Dacron graft was cut, and then the device was pulled out of the Dacron graft.  The graft itself was bled

## 2025-05-07 NOTE — PROGRESS NOTES
Pulmonary and Critical Care Progress note.    [facility]    Casimiro Vinson    MRN# [id number]    Acct# [id number]  5/7/2025   4:52 PM CDT    Referring Provider:Ryan Garcia DO      Chief Complaint: Respiratory failure on mechanical ventilation    HPI: Patient continues to be intubated on mechanical ventilation.  He is obtunded.    Medications:  The following medications were reviewed and adjustments made when necessary.    polyvinyl alcohol, 2 drop, Both Eyes, BID    cefTRIAXone (ROCEPHIN) IV, 1,000 mg, IntraVENous, Q24H    clopidogrel, 75 mg, Oral, Daily    insulin glargine, 0.15 Units/kg, SubCUTAneous, Nightly    pantoprazole (PROTONIX) 40 mg in sodium chloride (PF) 0.9 % 10 mL injection, 40 mg, IntraVENous, Daily    albumin human 25%, 25 g, IntraVENous, Q6H    epoetin arianna-epbx, 10,000 Units, SubCUTAneous, Once per day on Tuesday Thursday Saturday    aspirin, 81 mg, Oral, Daily    atorvastatin, 40 mg, Oral, Nightly    ferrous sulfate, 650 mg, Oral, Daily with breakfast    furosemide, 40 mg, Oral, Daily    montelukast, 10 mg, Oral, Daily    insulin lispro, 0-4 Units, SubCUTAneous, 4x Daily AC & HS         Physical Exam:  Vitals:    05/07/25 0623 05/07/25 0700 05/07/25 0800 05/07/25 0830   BP:       Pulse: 66 66 69 67   Resp: 21 23 22 22   Temp:  99.1 °F (37.3 °C) 99.2 °F (37.3 °C) 99.1 °F (37.3 °C)   TempSrc:   Rectal    SpO2: 90% 93% 92% 92%   Weight:       Height:          Intake/Output Summary (Last 24 hours) at 5/7/2025 0939  Last data filed at 5/7/2025 0800  Gross per 24 hour   Intake 1419.7 ml   Output 599 ml   Net 820.7 ml       General appearance: Middle-aged male who is intubated on mechanical ventilation obtunded   HEENT:normocephalic atraumatic  Heart:S1S2 no murmurs  Lungs:diminished bilaterally no rubs or tenderness or dullness to percussion  Abdomen:Soft non tender no organomegaly  Extremities: He does have acrocyanosis in the upper and lower extremities  Neuro:

## 2025-05-07 NOTE — PROGRESS NOTES
Impella pulled at 10:33 by Dr Soriano along with OR team, tolerated well    Electronically signed by Celestina Cabezas RN on 5/7/2025 at 11:25 AM

## 2025-05-07 NOTE — PROGRESS NOTES
POOJA calculated. CO 10.3, CI 4.4    Electronically signed by Crow Lozano RN on 5/7/2025 at 2:44 AM

## 2025-05-07 NOTE — PROGRESS NOTES
POOJA calculated. CO 9.4, CI 3.4. Dr. Soriano informed of results. Impella decreased to P-2 per verbal order.    Electronically signed by Crow Lozano RN on 5/6/2025 at 10:29 PM

## 2025-05-07 NOTE — PROGRESS NOTES
Progress Note    2025 4:41 AM          POD#   3    Subjective:  Mr. Vinson remains intubated and poorly responsive.  PULSE OXIMETRY RANGE: SpO2  Av.5 %  Min: 80 %  Max: 100 %  SUPPLEMENTAL O2:     Vital Signs: BP (!) 98/46   Pulse 64   Temp 98.9 °F (37.2 °C) (Rectal)   Resp 23   Ht 1.778 m (5' 10\")   Wt 113.4 kg (250 lb)   SpO2 (!) 89%   BMI 35.87 kg/m²    Temperature Range:   Temp: 98.9 °F (37.2 °C)   Temp  Av.3 °F (36.8 °C)  Min: 96.8 °F (36 °C)  Max: 99.4 °F (37.4 °C)                   Rhythm: normal sinus rhythm    Labs:   ABG:    Lab Results   Component Value Date/Time    PHART 7.350 2025 02:04 AM    PO2ART 94.0 2025 02:04 AM    VOC0JLU 40.0 2025 02:04 AM    K7HGUMYZ 96.2 2025 02:04 AM    IOE5JKD 18 2025 09:29 AM    IEL2WKV 22.1 2025 02:04 AM    BEART -3.3 2025 02:04 AM     CBC:   Recent Labs     25  04225  1200 25  0403   WBC 5.9 5.1 6.9   HGB 9.0* 8.7* 8.6*   HCT 26.9* 26.1* 26.1*   MCV 91.2 91.3 93.2   PLT 43* 35* 36*     BMP:   Recent Labs     25  1730 25  2052 25  0212 25  0428 25  0838 25  1641 25  2201 25  0204    139  --  139  --   --   --   --    K 4.4 4.2   < > 4.4   < > 4.6 4.6 4.7    102  --  104  --   --   --   --    CO2 20* 20*  --  21*  --   --   --   --    BUN 33* 29*  --  24*  --   --   --   --    CREATININE 6.2* 5.7*  --  4.5*  --   --   --   --     < > = values in this interval not displayed.     PT/INR:   Recent Labs     25  0306 25  0428 25  0403   PROTIME 35.2* 41.6* 29.6*   INR 3.61* 4.50* 2.88*     APTT:   Recent Labs     25  1022   APTT 54.9*     Chest X-Ray:  Left lung well expanded, mild vascular congestion, no infiltrates or effusions  CT:  I/O last 3 completed shifts:  In: 4410.1 [I.V.:3812.7; IV Piggyback:597.4]  Out: 2901 [Urine:104; Chest Tube:894]    Minimal output overnight  Air Leak:  No air leak  I/O last 3

## 2025-05-07 NOTE — PLAN OF CARE
Problem: ABCDS Injury Assessment  Goal: Absence of physical injury  Outcome: Progressing     Problem: Safety - Adult  Goal: Free from fall injury  Outcome: Progressing     Problem: Skin/Tissue Integrity  Goal: Skin integrity remains intact  Description: 1.  Monitor for areas of redness and/or skin breakdown2.  Assess vascular access sites hourly3.  Every 4-6 hours minimum:  Change oxygen saturation probe site4.  Every 4-6 hours:  If on nasal continuous positive airway pressure, respiratory therapy assess nares and determine need for appliance change or resting period  Outcome: Progressing     Problem: Pain  Goal: Verbalizes/displays adequate comfort level or baseline comfort level  Outcome: Progressing     Problem: Chronic Conditions and Co-morbidities  Goal: Patient's chronic conditions and co-morbidity symptoms are monitored and maintained or improved  Outcome: Not Progressing     Problem: Discharge Planning  Goal: Discharge to home or other facility with appropriate resources  Outcome: Not Progressing     Problem: Nutrition Deficit:  Goal: Optimize nutritional status  Outcome: Not Progressing

## 2025-05-07 NOTE — PROGRESS NOTES
Occupational Therapy    Patient:  Casimiro Vinson  MR#:   [id number]  Room #: INPATIENT   YOB: 1965    Pt remains intubated and sedated this date.  Pt progress limited at this time due to medical status and medical complications.  Will continue to monitor pt progress and ability to participate in Occupational Therapy.      Electronically signed by Guillermina Garcia OT on 5/7/2025 at 9:06 AM

## 2025-05-07 NOTE — PROGRESS NOTES
3.2 oz)   05/07/25 0500 -- 98.9 °F (37.2 °C) -- 63 22 (!) 89 % --   05/07/25 0400 -- 98.9 °F (37.2 °C) Rectal 64 23 (!) 89 % --   05/07/25 0300 -- 98.9 °F (37.2 °C) Rectal 65 23 (!) 88 % --   05/07/25 0230 -- 98.8 °F (37.1 °C) -- 65 22 (!) 87 % --   05/07/25 0200 -- 98.7 °F (37.1 °C) -- 63 22 (!) 84 % --   05/07/25 0100 -- 98.6 °F (37 °C) -- 68 22 94 % --   05/07/25 0030 -- 98.6 °F (37 °C) -- 65 22 100 % --   05/07/25 0000 -- 98.8 °F (37.1 °C) Rectal 66 22 100 % --   05/06/25 2300 -- 99.1 °F (37.3 °C) -- 62 22 97 % --   05/06/25 2200 -- 99.2 °F (37.3 °C) -- 63 22 93 % --   05/06/25 2100 (!) 98/46 99.3 °F (37.4 °C) Rectal 63 22 94 % --   05/06/25 2000 -- 99.4 °F (37.4 °C) Rectal 62 22 (!) 81 % --   05/06/25 1900 -- 99.4 °F (37.4 °C) Rectal 62 22 (!) 82 % --   05/06/25 1800 -- 99.4 °F (37.4 °C) Rectal 63 23 (!) 82 % --   05/06/25 1700 -- 99.3 °F (37.4 °C) Rectal 61 22 (!) 82 % --   05/06/25 1600 -- 99 °F (37.2 °C) Rectal 60 22 (!) 89 % --   05/06/25 1500 -- -- -- 60 22 90 % --   05/06/25 1447 -- -- -- 59 22 (!) 88 % --   05/06/25 1400 -- -- -- 58 22 (!) 80 % --   05/06/25 1300 -- -- -- 58 22 (!) 83 % --   05/06/25 1200 (!) 92/50 97.6 °F (36.4 °C) Rectal 57 22 (!) 83 % --   05/06/25 1128 -- -- -- 58 22 (!) 89 % --   05/06/25 1100 (!) 86/48 97.5 °F (36.4 °C) Rectal 58 22 (!) 87 % --       Intake/Output Summary (Last 24 hours) at 5/7/2025 1012  Last data filed at 5/7/2025 1000  Gross per 24 hour   Intake 1365.75 ml   Output 501 ml   Net 864.75 ml     General: Ventilated and unable to participate in the history and physical examination  Chest:  clear to auscultation bilaterally  CVS: regular rate and rhythm  Abdominal: soft, nontender, normal bowel sounds  Extremities: no cyanosis or edema  Skin: warm and dry without rash    Labs:  BMP:   Recent Labs     05/05/25 2052 05/06/25  0212 05/06/25  0428 05/06/25  0838 05/07/25  0204 05/07/25  0403 05/07/25  0447     --  139  --   --  139  --    K 4.2   < > 4.4   < > 4.7  tamponade. Evidence includes right ventricle chamber collapse.   Image quality is technically difficult.     XR CHEST PORTABLE  Result Date: 5/4/2025  EXAM:  ONE-VIEW CHEST  HISTORY:  Postop surgery, check for foreign body heart surgery  TECHNIQUE:  Single frontal view the chest and upper abdomen was obtained.  FINDINGS:  No definite radiopaque foreign bodies are seen within the thorax. Other wires are seen.  Patchy opacities are seen in the left mid and left lower lung. The tip of the NG tube is seen in the distal esophagus.      No definite radiopaque foreign bodies are seen within the thorax.  Probable atelectasis seen in the left mid and left lower lung.  The tip of the NG tube is seen in the distal esophagus. The NG tube should be advanced further into the stomach by approximately 6 inches.   ______________________________________ Electronically signed by: VIDHI MCNEILL M.D. Date:     05/04/2025 Time:    10:04     XR CHEST PORTABLE  Result Date: 5/4/2025  EXAM:  FRONTAL VIEW OF THE CHEST.  HISTORY:  Line/tube placement.  COMPARISON:  Chest radiograph 5/3/2025.  FINDINGS: Endotracheal tube tip is appropriate vision at the lower thoracic portion of the trachea above the cody.  Orogastric tube tip is in the distal esophagus.  Stable mild cardiomegaly.  Small left pleural effusion and opacities in the left mid lung.  No visible pneumothorax.  Tunneled dialysis catheter tip stable at the right atrium.        Shallow positioning of the orogastric tube with tip in the distal esophagus. Recommend advancing.  Appropriately positioned endotracheal tube.  Stable small left pleural effusion. Stable atelectasis versus infiltrate in the left lung.     ______________________________________ Electronically signed by: SANDI NEWMAN M.D. Date:     05/04/2025 Time:    07:11     XR CHEST PORTABLE  Result Date: 5/3/2025  EXAMINATION: AP CHEST RADIOGRAPH.  HISTORY: Chest pain  COMPARISON: None available.  FINDINGS:  There is a right

## 2025-05-07 NOTE — PROGRESS NOTES
Transported pt to CT per ambu bag at 15L sats 92-93%  Hr 60-66.  Transported pt back to room an placed back on prior vent settings.  Pt tolerate well

## 2025-05-07 NOTE — PROGRESS NOTES
Hospitalist Progress Note    Patient:  Casimiro Vinson  YOB: 1965  Date of Service: 5/7/2025  MRN: [id number]   Acct: [id number]   Primary Care Physician: Abdi Lozano DO  Advance Directive: Full Code  Admit Date: 5/3/2025       Hospital Day: 4  Referring Provider: Ryan Garcia DO    Patient Seen, Chart, Consults, Notes, Labs, Radiology studies reviewed.    Subjective:  Casimiro Vinson is a 60 y.o. male  whom we are following for ESRD, pericardial effusion with tamponade, cardiac arrest, hypoxemic respiratory failure.  Chart reviewed in detail.  He remains on the ventilator.  Nursing reports no significant change in his neurologic status.  There is concern that he may have had anoxic brain injury.    Allergies:  Levaquin [levofloxacin]    Medicines:  Current Facility-Administered Medications   Medication Dose Route Frequency Provider Last Rate Last Admin    0.9 % sodium chloride infusion   IntraVENous PRN Crow Soriano MD        norepinephrine (LEVOPHED) 16 mg in sodium chloride 0.9 % 250 mL infusion (premix)  1-100 mcg/min IntraVENous Continuous Crow Soriano MD        phenylephrine (DIANA-SYNEPHRINE) 100 mg in sodium chloride 0.9 % 250 mL infusion ([id number])   mcg/min IntraVENous Continuous Crow Soriano MD 45 mL/hr at 05/07/25 1209 300 mcg/min at 05/07/25 1209    polyvinyl alcohol (LIQUIFILM TEARS) 1.4 % ophthalmic solution 2 drop  2 drop Both Eyes BID Crow Soriano MD   2 drop at 05/07/25 0756    prismaSol BGK 2/3.5 dialysis solution   Dialysis Continuous Gonzalo Lanier MD        heparin (porcine) injection 1,100-1,900 Units  1,100-1,900 Units IntraCATHeter PRN Gonzalo Lanier MD   1,800 Units at 05/06/25 1400    And    heparin (porcine) injection 1,100-1,900 Units  1,100-1,900 Units IntraCATHeter PRN Gonzalo Lanier MD   1,800 Units at 05/06/25 1400    cefTRIAXone (ROCEPHIN) 1,000 mg in sterile water 10 mL IV syringe  1,000 mg IntraVENous Q24H

## 2025-05-08 ENCOUNTER — APPOINTMENT (OUTPATIENT)
Dept: GENERAL RADIOLOGY | Age: 60
DRG: 001 | End: 2025-05-08
Payer: MEDICARE

## 2025-05-08 ENCOUNTER — HOSPITAL ENCOUNTER (INPATIENT)
Age: 60
LOS: 1 days | DRG: 951 | End: 2025-05-08
Attending: INTERNAL MEDICINE | Admitting: INTERNAL MEDICINE
Payer: MEDICARE

## 2025-05-08 VITALS
RESPIRATION RATE: 22 BRPM | TEMPERATURE: 96.3 F | SYSTOLIC BLOOD PRESSURE: 78 MMHG | DIASTOLIC BLOOD PRESSURE: 43 MMHG | HEART RATE: 71 BPM | OXYGEN SATURATION: 95 %

## 2025-05-08 VITALS
DIASTOLIC BLOOD PRESSURE: 52 MMHG | TEMPERATURE: 97.5 F | SYSTOLIC BLOOD PRESSURE: 140 MMHG | WEIGHT: 268.2 LBS | HEIGHT: 70 IN | RESPIRATION RATE: 24 BRPM | OXYGEN SATURATION: 93 % | HEART RATE: 73 BPM | BODY MASS INDEX: 38.39 KG/M2

## 2025-05-08 PROBLEM — J96.01 ACUTE RESPIRATORY FAILURE WITH HYPOXEMIA (HCC): Status: ACTIVE | Noted: 2025-05-08

## 2025-05-08 PROBLEM — Z51.5 PALLIATIVE CARE PATIENT: Status: ACTIVE | Noted: 2025-05-08

## 2025-05-08 LAB
ACANTHOCYTES BLD QL SMEAR: ABNORMAL
ALBUMIN SERPL-MCNC: 3.9 G/DL (ref 3.5–5.2)
ALLENS TEST: ABNORMAL
ALP SERPL-CCNC: 156 U/L (ref 40–129)
ALT SERPL-CCNC: 123 U/L (ref 10–50)
ANION GAP SERPL CALCULATED.3IONS-SCNC: 15 MMOL/L (ref 8–16)
ANISOCYTOSIS BLD QL SMEAR: ABNORMAL
AST SERPL-CCNC: 298 U/L (ref 10–50)
BASE EXCESS ARTERIAL: -5 MMOL/L (ref -2–2)
BASE EXCESS ARTERIAL: -5.4 MMOL/L (ref -2–2)
BASOPHILS # BLD: 0.1 K/UL (ref 0–0.2)
BASOPHILS NFR BLD: 1 % (ref 0–1)
BILIRUB SERPL-MCNC: 9.5 MG/DL (ref 0.2–1.2)
BUN SERPL-MCNC: 20 MG/DL (ref 8–23)
BURR CELLS BLD QL SMEAR: ABNORMAL
CALCIUM SERPL-MCNC: 8.6 MG/DL (ref 8.8–10.2)
CARBOXYHEMOGLOBIN ARTERIAL: 2.3 % (ref 0–5)
CARBOXYHEMOGLOBIN ARTERIAL: 2.4 % (ref 0–5)
CHLORIDE SERPL-SCNC: 106 MMOL/L (ref 98–107)
CO2 SERPL-SCNC: 20 MMOL/L (ref 22–29)
CREAT SERPL-MCNC: 3.7 MG/DL (ref 0.7–1.2)
DOHLE BOD BLD QL SMEAR: ABNORMAL
EOSINOPHIL # BLD: 0.3 K/UL (ref 0–0.6)
EOSINOPHIL NFR BLD: 3 % (ref 0–5)
ERYTHROCYTE [DISTWIDTH] IN BLOOD BY AUTOMATED COUNT: 17.4 % (ref 11.5–14.5)
FIO2: 100 %
FIO2: 100 %
GLUCOSE BLD-MCNC: 81 MG/DL (ref 70–99)
GLUCOSE SERPL-MCNC: 98 MG/DL (ref 70–99)
HCO3 ARTERIAL: 21.9 MMOL/L (ref 22–26)
HCO3 ARTERIAL: 22.4 MMOL/L (ref 22–26)
HCT VFR BLD AUTO: 32.7 % (ref 42–52)
HEMOGLOBIN, ART, EXTENDED: 10.9 G/DL (ref 14–18)
HEMOGLOBIN, ART, EXTENDED: 10.9 G/DL (ref 14–18)
HGB BLD-MCNC: 10.3 G/DL (ref 14–18)
HYPOCHROMIA BLD QL SMEAR: ABNORMAL
IMM GRANULOCYTES # BLD: 0.9 K/UL
LYMPHOCYTES # BLD: 0.6 K/UL (ref 1.1–4.5)
LYMPHOCYTES NFR BLD: 6 % (ref 20–40)
MACROCYTES BLD QL SMEAR: ABNORMAL
MAGNESIUM SERPL-MCNC: 2.1 MG/DL (ref 1.6–2.4)
MCH RBC QN AUTO: 29.8 PG (ref 27–31)
MCHC RBC AUTO-ENTMCNC: 31.5 G/DL (ref 33–37)
MCV RBC AUTO: 94.5 FL (ref 80–94)
MECHANICAL RATE IN BPM: 22
MECHANICAL RATE IN BPM: 22
METAMYELOCYTES NFR BLD MANUAL: 1 %
METHEMOGLOBIN ARTERIAL: 0.8 %
METHEMOGLOBIN ARTERIAL: 1.2 %
MODE: ABNORMAL
MONOCYTES # BLD: 1.3 K/UL (ref 0–0.9)
MONOCYTES NFR BLD: 13 % (ref 0–10)
NEUTROPHILS # BLD: 7.8 K/UL (ref 1.5–7.5)
NEUTS BAND NFR BLD MANUAL: 17 % (ref 0–5)
NEUTS SEG NFR BLD: 59 % (ref 50–65)
NEUTS VAC BLD QL SMEAR: ABNORMAL
O2 CONTENT ARTERIAL: 13.7 ML/DL
O2 CONTENT ARTERIAL: 15 ML/DL
O2 SAT, ARTERIAL: 88.9 %
O2 SAT, ARTERIAL: 96.7 %
O2 THERAPY: ABNORMAL
O2 THERAPY: ABNORMAL
OVALOCYTES BLD QL SMEAR: ABNORMAL
PCO2 ARTERIAL: 50 MMHG (ref 35–45)
PCO2 ARTERIAL: 51 MMHG (ref 35–45)
PERFORMED ON: NORMAL
PH ARTERIAL: 7.25 (ref 7.35–7.45)
PH ARTERIAL: 7.25 (ref 7.35–7.45)
PHOSPHATE SERPL-MCNC: 3.3 MG/DL (ref 2.5–4.5)
PLATELET # BLD AUTO: 37 K/UL (ref 130–400)
PLATELET SLIDE REVIEW: ABNORMAL
PMV BLD AUTO: 13 FL (ref 9.4–12.4)
PO2 ARTERIAL: 58 MMHG (ref 80–100)
PO2 ARTERIAL: 99 MMHG (ref 80–100)
POLYCHROMASIA BLD QL SMEAR: ABNORMAL
POSITIVE END EXP PRESS: 5
POSITIVE END EXP PRESS: 8
POTASSIUM BLD-SCNC: 4.5 MMOL/L
POTASSIUM BLD-SCNC: 4.6 MMOL/L
POTASSIUM SERPL-SCNC: 4.6 MMOL/L (ref 3.5–5.1)
PROT SERPL-MCNC: 4.9 G/DL (ref 6.4–8.3)
RBC # BLD AUTO: 3.46 M/UL (ref 4.7–6.1)
SAMPLE SOURCE: ABNORMAL
SAMPLE SOURCE: ABNORMAL
SARS-COV-2 RDRP RESP QL NAA+PROBE: NOT DETECTED
SODIUM SERPL-SCNC: 141 MMOL/L (ref 136–145)
TARGETS BLD QL SMEAR: ABNORMAL
VT MECHANICAL: 450 %
VT MECHANICAL: 450 %
WBC # BLD AUTO: 10.1 K/UL (ref 4.8–10.8)

## 2025-05-08 PROCEDURE — 83735 ASSAY OF MAGNESIUM: CPT

## 2025-05-08 PROCEDURE — 99223 1ST HOSP IP/OBS HIGH 75: CPT | Performed by: PHYSICIAN ASSISTANT

## 2025-05-08 PROCEDURE — 71045 X-RAY EXAM CHEST 1 VIEW: CPT

## 2025-05-08 PROCEDURE — 6560000002 HC HOSPICE GENERAL INPATIENT

## 2025-05-08 PROCEDURE — 33992 RMVL PERQ LEFT HEART VAD: CPT | Performed by: SURGERY

## 2025-05-08 PROCEDURE — 85025 COMPLETE CBC W/AUTO DIFF WBC: CPT

## 2025-05-08 PROCEDURE — 94760 N-INVAS EAR/PLS OXIMETRY 1: CPT

## 2025-05-08 PROCEDURE — 6360000002 HC RX W HCPCS: Performed by: SURGERY

## 2025-05-08 PROCEDURE — 2580000003 HC RX 258: Performed by: INTERNAL MEDICINE

## 2025-05-08 PROCEDURE — 84100 ASSAY OF PHOSPHORUS: CPT

## 2025-05-08 PROCEDURE — 80053 COMPREHEN METABOLIC PANEL: CPT

## 2025-05-08 PROCEDURE — 2500000003 HC RX 250 WO HCPCS: Performed by: SURGERY

## 2025-05-08 PROCEDURE — 82962 GLUCOSE BLOOD TEST: CPT

## 2025-05-08 PROCEDURE — 2580000003 HC RX 258: Performed by: SURGERY

## 2025-05-08 PROCEDURE — P9047 ALBUMIN (HUMAN), 25%, 50ML: HCPCS | Performed by: SURGERY

## 2025-05-08 PROCEDURE — 2700000000 HC OXYGEN THERAPY PER DAY

## 2025-05-08 PROCEDURE — 6370000000 HC RX 637 (ALT 250 FOR IP): Performed by: INTERNAL MEDICINE

## 2025-05-08 PROCEDURE — 94003 VENT MGMT INPAT SUBQ DAY: CPT

## 2025-05-08 PROCEDURE — 82803 BLOOD GASES ANY COMBINATION: CPT

## 2025-05-08 PROCEDURE — 87635 SARS-COV-2 COVID-19 AMP PRB: CPT

## 2025-05-08 PROCEDURE — 6360000002 HC RX W HCPCS: Performed by: INTERNAL MEDICINE

## 2025-05-08 PROCEDURE — 5A1955Z RESPIRATORY VENTILATION, GREATER THAN 96 CONSECUTIVE HOURS: ICD-10-PCS | Performed by: SURGERY

## 2025-05-08 PROCEDURE — 37799 UNLISTED PX VASCULAR SURGERY: CPT

## 2025-05-08 PROCEDURE — 99233 SBSQ HOSP IP/OBS HIGH 50: CPT | Performed by: PSYCHIATRY & NEUROLOGY

## 2025-05-08 RX ORDER — POTASSIUM CHLORIDE 29.8 MG/ML
20 INJECTION INTRAVENOUS PRN
OUTPATIENT
Start: 2025-05-08

## 2025-05-08 RX ORDER — 0.9 % SODIUM CHLORIDE 0.9 %
500 INTRAVENOUS SOLUTION INTRAVENOUS CONTINUOUS PRN
OUTPATIENT
Start: 2025-05-08

## 2025-05-08 RX ORDER — HEPARIN SODIUM 1000 [USP'U]/ML
INJECTION, SOLUTION INTRAVENOUS; SUBCUTANEOUS PRN
OUTPATIENT
Start: 2025-05-08

## 2025-05-08 RX ORDER — BISACODYL 10 MG
10 SUPPOSITORY, RECTAL RECTAL DAILY PRN
Status: DISCONTINUED | OUTPATIENT
Start: 2025-05-08 | End: 2025-05-08 | Stop reason: HOSPADM

## 2025-05-08 RX ORDER — FERROUS SULFATE 325(65) MG
650 TABLET ORAL
OUTPATIENT
Start: 2025-05-09

## 2025-05-08 RX ORDER — MONTELUKAST SODIUM 10 MG/1
10 TABLET ORAL DAILY
OUTPATIENT
Start: 2025-05-09

## 2025-05-08 RX ORDER — OXYCODONE HYDROCHLORIDE 5 MG/1
5 TABLET ORAL EVERY 4 HOURS PRN
Refills: 0 | OUTPATIENT
Start: 2025-05-08

## 2025-05-08 RX ORDER — MEPERIDINE HYDROCHLORIDE 25 MG/ML
25 INJECTION INTRAMUSCULAR; INTRAVENOUS; SUBCUTANEOUS
Refills: 0 | OUTPATIENT
Start: 2025-05-08

## 2025-05-08 RX ORDER — GLUCAGON 1 MG/ML
1 KIT INJECTION PRN
OUTPATIENT
Start: 2025-05-08

## 2025-05-08 RX ORDER — NOREPINEPHRINE BITARTRATE 0.06 MG/ML
1-100 INJECTION, SOLUTION INTRAVENOUS CONTINUOUS
OUTPATIENT
Start: 2025-05-08

## 2025-05-08 RX ORDER — SODIUM CHLORIDE 9 MG/ML
INJECTION, SOLUTION INTRAVENOUS PRN
OUTPATIENT
Start: 2025-05-08

## 2025-05-08 RX ORDER — ONDANSETRON 2 MG/ML
4 INJECTION INTRAMUSCULAR; INTRAVENOUS EVERY 6 HOURS PRN
Status: DISCONTINUED | OUTPATIENT
Start: 2025-05-08 | End: 2025-05-08 | Stop reason: HOSPADM

## 2025-05-08 RX ORDER — POLYVINYL ALCOHOL 14 MG/ML
2 SOLUTION/ DROPS OPHTHALMIC 2 TIMES DAILY
OUTPATIENT
Start: 2025-05-08

## 2025-05-08 RX ORDER — ONDANSETRON 2 MG/ML
4 INJECTION INTRAMUSCULAR; INTRAVENOUS EVERY 6 HOURS PRN
OUTPATIENT
Start: 2025-05-08

## 2025-05-08 RX ORDER — POLYETHYLENE GLYCOL 3350 17 G/17G
17 POWDER, FOR SOLUTION ORAL DAILY PRN
OUTPATIENT
Start: 2025-05-08

## 2025-05-08 RX ORDER — FUROSEMIDE 40 MG/1
40 TABLET ORAL DAILY
OUTPATIENT
Start: 2025-05-09

## 2025-05-08 RX ORDER — ZOLPIDEM TARTRATE 5 MG/1
5 TABLET ORAL NIGHTLY PRN
OUTPATIENT
Start: 2025-05-08

## 2025-05-08 RX ORDER — ATORVASTATIN CALCIUM 40 MG/1
40 TABLET, FILM COATED ORAL NIGHTLY
OUTPATIENT
Start: 2025-05-08

## 2025-05-08 RX ORDER — CLOPIDOGREL BISULFATE 75 MG/1
75 TABLET ORAL DAILY
OUTPATIENT
Start: 2025-05-09

## 2025-05-08 RX ORDER — SODIUM CHLORIDE 0.9 % (FLUSH) 0.9 %
5-40 SYRINGE (ML) INJECTION PRN
OUTPATIENT
Start: 2025-05-08

## 2025-05-08 RX ORDER — MAGNESIUM SULFATE IN WATER 40 MG/ML
2000 INJECTION, SOLUTION INTRAVENOUS PRN
OUTPATIENT
Start: 2025-05-08

## 2025-05-08 RX ORDER — ACETAMINOPHEN 650 MG/1
650 SUPPOSITORY RECTAL EVERY 6 HOURS PRN
OUTPATIENT
Start: 2025-05-08

## 2025-05-08 RX ORDER — ALBUMIN (HUMAN) 12.5 G/50ML
25 SOLUTION INTRAVENOUS EVERY 6 HOURS
OUTPATIENT
Start: 2025-05-08

## 2025-05-08 RX ORDER — OXYCODONE HYDROCHLORIDE 10 MG/1
10 TABLET ORAL EVERY 4 HOURS PRN
Refills: 0 | OUTPATIENT
Start: 2025-05-08

## 2025-05-08 RX ORDER — PROTAMINE SULFATE 10 MG/ML
50 INJECTION, SOLUTION INTRAVENOUS
OUTPATIENT
Start: 2025-05-08

## 2025-05-08 RX ORDER — ACETAMINOPHEN 325 MG/1
650 TABLET ORAL EVERY 6 HOURS PRN
OUTPATIENT
Start: 2025-05-08

## 2025-05-08 RX ORDER — DEXTROSE MONOHYDRATE 100 MG/ML
INJECTION, SOLUTION INTRAVENOUS CONTINUOUS PRN
OUTPATIENT
Start: 2025-05-08

## 2025-05-08 RX ORDER — DEXTROSE MONOHYDRATE 25 G/50ML
25 INJECTION, SOLUTION INTRAVENOUS PRN
OUTPATIENT
Start: 2025-05-08

## 2025-05-08 RX ORDER — INSULIN LISPRO 100 [IU]/ML
0-4 INJECTION, SOLUTION INTRAVENOUS; SUBCUTANEOUS
OUTPATIENT
Start: 2025-05-08

## 2025-05-08 RX ORDER — LORAZEPAM 2 MG/ML
1 INJECTION INTRAMUSCULAR
Status: DISCONTINUED | OUTPATIENT
Start: 2025-05-08 | End: 2025-05-08 | Stop reason: HOSPADM

## 2025-05-08 RX ORDER — CALCIUM CHLORIDE, MAGNESIUM CHLORIDE, DEXTROSE MONOHYDRATE, LACTIC ACID, SODIUM CHLORIDE, SODIUM BICARBONATE AND POTASSIUM CHLORIDE 5.15; 2.03; 22; 5.4; 6.46; 3.09; .157 G/L; G/L; G/L; G/L; G/L; G/L; G/L
INJECTION INTRAVENOUS CONTINUOUS
OUTPATIENT
Start: 2025-05-08

## 2025-05-08 RX ORDER — ONDANSETRON 4 MG/1
4 TABLET, ORALLY DISINTEGRATING ORAL EVERY 8 HOURS PRN
OUTPATIENT
Start: 2025-05-08

## 2025-05-08 RX ORDER — SCOPOLAMINE 1 MG/3D
1 PATCH, EXTENDED RELEASE TRANSDERMAL
Status: DISCONTINUED | OUTPATIENT
Start: 2025-05-08 | End: 2025-05-08 | Stop reason: HOSPADM

## 2025-05-08 RX ORDER — GLYCOPYRROLATE 0.2 MG/ML
0.2 INJECTION INTRAMUSCULAR; INTRAVENOUS EVERY 4 HOURS PRN
Status: DISCONTINUED | OUTPATIENT
Start: 2025-05-08 | End: 2025-05-08 | Stop reason: HOSPADM

## 2025-05-08 RX ORDER — ASPIRIN 81 MG/1
81 TABLET, CHEWABLE ORAL DAILY
OUTPATIENT
Start: 2025-05-09

## 2025-05-08 RX ORDER — HYDROMORPHONE HYDROCHLORIDE 1 MG/ML
1 INJECTION, SOLUTION INTRAMUSCULAR; INTRAVENOUS; SUBCUTANEOUS
Status: DISCONTINUED | OUTPATIENT
Start: 2025-05-08 | End: 2025-05-08 | Stop reason: HOSPADM

## 2025-05-08 RX ADMIN — POLYVINYL ALCOHOL 2 DROP: 1.4 SOLUTION/ DROPS OPHTHALMIC at 07:37

## 2025-05-08 RX ADMIN — Medication 1 MG: at 14:57

## 2025-05-08 RX ADMIN — PHENYLEPHRINE HYDROCHLORIDE 300 MCG/MIN: 10 INJECTION INTRAVENOUS at 01:01

## 2025-05-08 RX ADMIN — Medication 29 MCG/MIN: at 01:02

## 2025-05-08 RX ADMIN — PHENYLEPHRINE HYDROCHLORIDE 300 MCG/MIN: 10 INJECTION INTRAVENOUS at 07:37

## 2025-05-08 RX ADMIN — VASOPRESSIN 0.03 UNITS/MIN: 20 INJECTION INTRAVENOUS at 01:16

## 2025-05-08 RX ADMIN — PHENYLEPHRINE HYDROCHLORIDE 300 MCG/MIN: 10 INJECTION INTRAVENOUS at 06:40

## 2025-05-08 RX ADMIN — SODIUM CHLORIDE, PRESERVATIVE FREE 40 MG: 5 INJECTION INTRAVENOUS at 07:37

## 2025-05-08 RX ADMIN — VASOPRESSIN 0.03 UNITS/MIN: 20 INJECTION INTRAVENOUS at 09:12

## 2025-05-08 RX ADMIN — GLYCOPYRROLATE 0.2 MG: 0.2 INJECTION INTRAMUSCULAR; INTRAVENOUS at 13:26

## 2025-05-08 RX ADMIN — HYDROMORPHONE HYDROCHLORIDE 1 MG/HR: 10 INJECTION, SOLUTION INTRAMUSCULAR; INTRAVENOUS; SUBCUTANEOUS at 13:26

## 2025-05-08 RX ADMIN — WATER 1000 MG: 1 INJECTION INTRAMUSCULAR; INTRAVENOUS; SUBCUTANEOUS at 01:49

## 2025-05-08 RX ADMIN — ALBUMIN (HUMAN) 25 G: 0.25 INJECTION, SOLUTION INTRAVENOUS at 05:44

## 2025-05-08 RX ADMIN — Medication 65 MCG/MIN: at 07:38

## 2025-05-08 ASSESSMENT — PULMONARY FUNCTION TESTS
PIF_VALUE: 42
PIF_VALUE: 42
PIF_VALUE: 49
PIF_VALUE: 39
PIF_VALUE: 41
PIF_VALUE: 37
PIF_VALUE: 35
PIF_VALUE: 46
PIF_VALUE: 54
PIF_VALUE: 33
PIF_VALUE: 33
PIF_VALUE: 44
PIF_VALUE: 43
PIF_VALUE: 44
PIF_VALUE: 35
PIF_VALUE: 35
PIF_VALUE: 45
PIF_VALUE: 36

## 2025-05-08 ASSESSMENT — PAIN SCALES - GENERAL: PAINLEVEL_OUTOF10: 0

## 2025-05-08 NOTE — PROGRESS NOTES
[facility] Neurology Progress Note      Patient:   Casimiro Vinson  MR#:    [id number]   Room:    [facility]2/142-01   YOB: 1965  Date of Progress Note: 5/8/2025  Time of Note                           9:50 AM  Consulting Physician:  Chong Sales DO  Attending Physician:  Ryan Garcia DO      INTERVAL HISTORY:  Remains largely unchanged.     REVIEW OF SYSTEMS:  Limited given ASM.     PHYSICAL EXAM:    Constitutional -   BP (!) 140/52   Pulse 75   Temp 98.5 °F (36.9 °C)   Resp 23   Ht 1.778 m (5' 10\")   Wt 121.7 kg (268 lb 3.2 oz)   SpO2 95%   BMI 38.48 kg/m²   General appearance: Intubated, obtunded   EYES -   Conjunctiva - chemosis   Pupillary exam as below, see CN exam in the neurologic exam  ENT-    No scars, masses, or lesions over external nose or ears  Musculoskeletal -   No significant wasting of muscles noted  Gait as below, see gait exam in the neurologic exam  Muscle strength, tone, stability as below see the motor exam in the neurologic exam.   No bony deformities  Skin -   Warm, dry, and intact to inspection and palpation.    No rash, erythema, or pallor        NEUROLOGICAL EXAM     Mental status    [] Awake, alert, oriented   [] Affect attention and concentration appear appropriate  [] Recent and remote memory appears unremarkable  [] Speech normal without dysarthria or aphasia, comprehension and repetition intact.   COMMENTS:Unresponsive to voice, noxious stimulation    Cranial Nerves [] No VF deficit to confrontation,  optic discs normal, no papilledema on fundoscopic exam.  [] PERRLA, EOMI, no nystagmus, conjugate eye movements, no ptosis  [] Face symmetric  [] Facial sensation intact  [] Tongue midline no atrophy or fasciculations present  [] Palate midline, hearing to finger rub normal  [] Shoulder shrug and SCM testing normal  COMMENTS:Pupils mid position not reactive, corneal absent, OCR absent, spontaneous respirations noted on the vent    Motor   [] 5/5 strength x 4

## 2025-05-08 NOTE — PROGRESS NOTES
Many family members bedside as dilaudid gtt was started. Family educated and given the opportunity to ask questions. Patient remains unresponsive.

## 2025-05-08 NOTE — DIALYSIS
[facility]  DIALYSIS TREATMENT SUMMARY      Note: Consult with the attending physician for patient treatment orders, this document is not a physician order.      Patient Information   Patient Casimiro Vinson   Date of Birth February 03, 1965   Chart Number [id number]   Location [facility]   Location MRN [id number]   Gender Male   SSN (last 4)    Treatment Information   Treatment Type CRRT   Treatment Id [id number]   Start Time May 07, 2025 13:23   End Time May 08, 2025 10:30   Acutal Duration 21:07   Facility Information   Facility Information   [facility]   Treatment Start Date 05/07/2025   Treatment Start Time 13:23   Ordering MD Esposito   Account/Finance Number [id number]   Admission Status InPatient   Location ICU   Room # 142   Bed # 1   Stat Treatment No   Staff Report Received Yes   Hospital Nurse Celestina Cabezas RN   Time 12:40   Notes verbal report   Patient Type Chronic dialysis patient with diagnosis of ESRD   Patient Chronic Unit [facility]   Patient Home Unit [place]   Code Status Full Code   Diagnosis   Diagnosis ESRD needing dialysis   Isolation Information   Isolation Required? N   Completed by   General Tx information Entered by Roya Mo RN   Signing   Signed By Roya Mo RN   Pre Focused Assessment   Dialysis Access   Access Type Central Venous Catheter   Central Venous Catheter   Access Type Catheter - Tunneled   Access Location Chest Wall - R   Current/New [facility] Patient Yes   Attending Nephrologist Yannick   1st Use Catheter Verified by Previous Use   Catheter Care Completed per Policy Yes   Type of Dressing Film Biopatch/CHG   Dressing Changed No   Date Changed 05/06/2025   If No select Reason Dressing Change Not Indicated per Policy   CVC Line Education Provided No - Sedated   Patient Arrival   Patient ID Verified Date of Birth    Full Name   Patient Consent to treatment verified Yes   Blood Transfusion Consent Verified

## 2025-05-08 NOTE — PROGRESS NOTES
Occupational Therapy  Per chart review of medical status, Occupational therapy will sign off. Electronically signed by Stacy Beth OT on 5/8/2025 at 8:16 AM

## 2025-05-08 NOTE — PROGRESS NOTES
Hospitalist Progress Note    Patient:  Casimiro Vinson  YOB: 1965  Date of Service: 5/8/2025  MRN: [id number]   Acct: [id number]   Primary Care Physician: Abdi Lozano DO  Advance Directive: Full Code  Admit Date: 5/3/2025       Hospital Day: 5  Referring Provider: Ryan Garcia DO    Patient Seen, Chart, Consults, Notes, Labs, Radiology studies reviewed.    Subjective:  Casimiro Vinson is a 60 y.o. male  whom we are following for ESRD, pericardial effusion with tamponade, cardiac arrest, hypoxemic respiratory failure.  No new events overnight.  CT of the head shows generalized cerebral edema most consistent with global hypoxic ischemic injury.  His Impella was removed yesterday.    Allergies:  Levaquin [levofloxacin]    Medicines:  Current Facility-Administered Medications   Medication Dose Route Frequency Provider Last Rate Last Admin    0.9 % sodium chloride infusion   IntraVENous PRN Crow Soriano MD        norepinephrine (LEVOPHED) 16 mg in sodium chloride 0.9 % 250 mL infusion (premix)  1-100 mcg/min IntraVENous Continuous Crow Soriano MD 60.9 mL/hr at 05/08/25 0738 65 mcg/min at 05/08/25 0738    phenylephrine (DIANA-SYNEPHRINE) 100 mg in sodium chloride 0.9 % 250 mL infusion ([id number])   mcg/min IntraVENous Continuous Crow Soriano MD 45 mL/hr at 05/08/25 0737 300 mcg/min at 05/08/25 0737    polyvinyl alcohol (LIQUIFILM TEARS) 1.4 % ophthalmic solution 2 drop  2 drop Both Eyes BID Crow Soriano MD   2 drop at 05/08/25 0737    prismaSol BGK 2/3.5 dialysis solution   Dialysis Continuous Gonzalo Lanier MD        heparin (porcine) injection 1,100-1,900 Units  1,100-1,900 Units IntraCATHeter PRN Gonzalo Lanier MD   1,800 Units at 05/06/25 1400    And    heparin (porcine) injection 1,100-1,900 Units  1,100-1,900 Units IntraCATHeter PRN Gonzalo Lanier MD   1,800 Units at 05/06/25 1400    cefTRIAXone (ROCEPHIN) 1,000 mg in sterile water 10 mL IV

## 2025-05-08 NOTE — PROGRESS NOTES
[facility] arrived at [facility] and manual ventilation. Ventilator connected and vital signs obtained. Assessment completed by MIRIAN Palm RN.

## 2025-05-08 NOTE — PROGRESS NOTES
Report called to hospice, CRRT stopped at this time    Electronically signed by Celestina Cabezas RN on 5/8/2025 at 11:26 AM

## 2025-05-08 NOTE — PROGRESS NOTES
Patient's dilaudid gtt titrated to 2.0mg/hr. PRN ativan administered prior to extubation. Respiratory contacted for extubation.

## 2025-05-08 NOTE — CONSULTS
montelukast (SINGULAIR) 10 MG tablet Take 1 tablet by mouth daily    ProviderShubham MD   furosemide (LASIX) 40 MG tablet Take 1 tablet by mouth daily    Shubham Solitario MD   insulin 70-30 (HUMULIN;NOVOLIN) (70-30) 100 UNIT per ML injection vial Inject 8 Units into the skin 2 times daily    ProviderShubham MD     Allergies:    Levaquin [levofloxacin]    Social History:    The patient currently lives at home with his spouse  Tobacco:   reports that he quit smoking about 27 years ago. His smoking use included cigarettes. He started smoking about 41 years ago. He has a 7 pack-year smoking history. He has been exposed to tobacco smoke. His smokeless tobacco use includes chew and snuff.  Alcohol:   reports no history of alcohol use.  Illicit Drugs: None known    Family History:  History reviewed. No pertinent family history.    Review of Systems:   Unable to obtain secondary to altered mental status    Physical Examination:  BP (!) 140/52   Pulse 77   Temp 99 °F (37.2 °C) (Rectal)   Resp 22   Ht 1.778 m (5' 10\")   Wt 121.7 kg (268 lb 3.2 oz)   SpO2 (!) 82%   BMI 38.48 kg/m²   General appearance: Ill-appearing male, intubated  Head: Normocephalic, without obvious abnormality, atraumatic  Eyes: conjunctivae/corneas clear. PERRL, EOM's intact.   Ears/Nose: Pupils not reactive  Neck/Throat: supple, symmetrical, trachea midline, ET tube present  Pulmonary: Coarse throughout, mechanically ventilated  Cardiovascular: Irregular  Gastrointestinal: Distended  Musculoskeletal:+ peripheral edema   Skin: Warm, dry  Neurologic: Non-responsive     Diagnostic Data:  CBC:  Recent Labs     05/06/25  1200 05/07/25  0403 05/07/25  1700 05/08/25  0130   WBC 5.1 6.9  --  10.1   HGB 8.7* 8.6* 9.9* 10.3*   HCT 26.1* 26.1* 30.5* 32.7*   PLT 35* 36*  --  37*     BMP:  Recent Labs     05/06/25  0428 05/06/25  0838 05/07/25  0403 05/07/25  0447 05/07/25  2240 05/08/25  0130 05/08/25  0402     --  139  --   --  141   the reason for consultation.  We reviewed his medical history and his recent hospital stays.  We reviewed his hospital course at this facility to include workup and treatment provided.  Family are tearful and wanted to discuss meaning of anoxic brain injury.  We reviewed the imaging and prognosis associated with that.  I did ask if they had ever had a discussion with the patient what his wishes would be in this type of situation and they unanimously stated that he would have already wanted to stop care at this point but they had not been ready to do so.  They confirm his CODE STATUS as DO NOT RESUSCITATE at this time.  They do want to move forward with discussion regarding withdrawal of care and transition to hospice for palliative extubation.  They state understanding that once he is extubated he may pass fairly quickly.  We discussed that his pressors would be weaned once in hospice but not before that.  We discussed that care would be comfort focused.  They are all in agreement at this time that this is the most appropriate course of action and would be consistent with the patient's wishes.  Opportunity for questions emotional support provided.  I reviewed with ICU RN as well as Dr. Garcia and Dr. Soriano.    Palliative team will follow as needed.    Recommendations:     Palliative Care-[facility] transition to hospice for palliative extubation Code status: DNR   S/p cardiac arrest with large pericardial effusion and cardiac tamponade-Impella placement and relief of effusion performed 05/04/2025 emergently, s/p impella removal, remains dependent on multiple pressors   Altered mental status with workup consistent with anoxic brain injury-Neurology following, supportive care  ESRD on HD-dialysis to be terminated once transitioned to hospice     Thank you for consulting palliative care and allowing us to participate in the care of the patient.    CounselingTopics: Goals of care, Code Status, Disease process education,

## 2025-05-08 NOTE — PROGRESS NOTES
This  visited with pt's family to follow up and provide spiritual care. Pt's wife Angela and several other family members, and they were in the waiting room. Pt's wife says they are Hinduism and their lucy is important to them. This  provided a listening ear, support, sustaining presence, comfort, and prayer. Pt and family expressed gratitude for spiritual care.       Spiritual Health History and Assessment/Progress Note  [facility]    Spiritual/Emotional Needs, Family Care, Emotional distress, Anticipatory Grief,      Name: Casimiro Vinson MRN: [id number]    Age: 60 y.o.     Sex: male   Language: English   Synagogue: Hinduism   ESRD needing dialysis (HCC)     Date: 5/8/2025            Total Time Calculated: 15 min              Spiritual Assessment continued in [facility] ICU        Referral/Consult From: Palliative Care   Encounter Overview/Reason: Spiritual/Emotional Needs  Service Provided For: Family    Lucy, Belief, Meaning:   Patient unable to assess at this time  Family/Friends identify as spiritual and are connected with a lucy tradition or spiritual practice per pt's wife for her and the pt.       Importance and Influence:  Patient unable to assess at this time  Family/Friends have spiritual/personal beliefs that influence decisions regarding the patient's health    Community:  Patient Other: Unable to assess.  Family/Friends are connected with a spiritual community:    Assessment and Plan of Care:     Patient Interventions include: Other: Pt is unavailable on a ventilator.   Family/Friends Interventions include: Affirmed coping skills/support systems and Provided sacramental/Tenriism ritual    Patient Plan of Care: Spiritual Care available upon further referral  Family/Friends Plan of Care: Spiritual Care available upon further referral    Electronically signed by Mary Behrens, Chaplain on 5/8/2025 at 10:48 AM

## 2025-05-08 NOTE — PROGRESS NOTES
Nutrition Assessment     Type and Reason for Visit: Reassess    Nutrition Recommendations/Plan:   Comfort measures     Malnutrition Assessment:  Malnutrition Status: At risk for malnutrition    Nutrition Assessment:  Patient remains on vent, no Propofol, Prognosis poor.  Aware patient to be discharged to Hospice.    Estimated Daily Nutrient Needs:  Energy (kcal):  0543-1872 kcals (11-14 kcals/kg) Weight Used for Energy Requirements: Current     Protein (g):  151g Weight Used for Protein Requirements: Ideal        Fluid (ml/day):  5447-5906 ml Method Used for Fluid Requirements: Standard renal    Nutrition Related Findings:   +1 facial edema., +2 nonpitting BLE edema., +2 nonpitting, weeping BUE edema, nonpitting generalized edema Wound Type: Surgical Incision    Current Nutrition Therapies:    Diet NPO    Anthropometric Measures:  Height: 177.8 cm (5' 10\")  Current Body Wt: 121.7 kg (268 lb 4.8 oz)   BMI: 38.5        Nutrition Diagnosis:   Inadequate oral intake, Altered nutrition-related lab values related to acute injury/trauma, impaired respiratory function, renal dysfunction, endocrine dysfunction as evidenced by NPO or clear liquid status due to medical condition, intubation, wounds, dialysis, lab values    Nutrition Interventions:   Food and/or Nutrient Delivery: Continue NPO  Nutrition Education/Counseling: No recommendation at this time  Coordination of Nutrition Care: Continue to monitor while inpatient  Plan of Care discussed with: nursing    Goals:  Goals: Other  Type of Goal: New goal  Previous Goal Met: Progress towards Goal(s) Declining    Nutrition Monitoring and Evaluation:   Behavioral-Environmental Outcomes: None Identified  Food/Nutrient Intake Outcomes: None Identified  Physical Signs/Symptoms Outcomes: Other (comfort measures)    Discharge Planning:    No discharge needs at this time     Kathya Rice MS, RD, LD  Contact: [phone]

## 2025-05-08 NOTE — PROGRESS NOTES
Patient's gtt titrated to 1.5mg/hr for comfort in anticipation of extubation.     1426-Patient's gtt titrated to 1.75mg/hr for comfort.

## 2025-05-08 NOTE — DISCHARGE SUMMARY
Discharge Summary    Casimiro Vinson  :  1965  MRN:  [id number]    Admit date:  5/3/2025  Discharge date: 2025    Admitting Physician:  Crow Soriano MD    Advance Directive: DNR    Consults: cardiology, pulmonary/intensive care, nephrology, neurology, palliative care, and hospice    Primary Care Physician:  Abdi Lozano, DO    Discharge Diagnoses:  Principal Problem:    ESRD needing dialysis (HCC)  Active Problems:    Shortness of breath    Type II diabetes mellitus (HCC)    Palliative care patient  Resolved Problems:    * No resolved hospital problems. *      Significant Diagnostic Studies:   Echo (TTE) complete (PRN contrast/bubble/strain/3D)  Result Date: 2025    Left Ventricle: Severely reduced left ventricular systolic function with a visually estimated EF of 20 - 25%. EF by visual approximation is 25%. Left ventricle size is normal. Normal wall thickness. Severe global hypokinesis present. Abnormal diastolic function.   Right Ventricle: Right ventricle is compressed. Severely reduced systolic function.   Pericardium: Large (>2 cm) pericardial effusion present. Indication of cardiac tamponade. Evidence includes right ventricle chamber collapse.   Image quality is technically difficult.     XR CHEST PORTABLE  Result Date: 2025  EXAM:  ONE-VIEW CHEST  HISTORY:  Postop surgery, check for foreign body heart surgery  TECHNIQUE:  Single frontal view the chest and upper abdomen was obtained.  FINDINGS:  No definite radiopaque foreign bodies are seen within the thorax. Other wires are seen.  Patchy opacities are seen in the left mid and left lower lung. The tip of the NG tube is seen in the distal esophagus.      No definite radiopaque foreign bodies are seen within the thorax.  Probable atelectasis seen in the left mid and left lower lung.  The tip of the NG tube is seen in the distal esophagus. The NG tube should be advanced further into the stomach by approximately 6 inches.    Patient will be transferred to inpatient hospice.    Time spent on discharge 40 minutes.    Signed:  Ryan Garcia DO

## 2025-05-08 NOTE — PROGRESS NOTES
Patient declared  by Leanne Palm RN at 1518 after 5 minutes of no cardiac or respiratory activity. Family notified. Provider notified. [employer] notified.

## 2025-05-08 NOTE — CARE COORDINATION
Hospice referral sent to [facility] by [facility]. Awaiting acceptance/denial. Electronically signed by Cathy Meneses RN on 5/8/2025 at 9:25 AM

## 2025-05-08 NOTE — PLAN OF CARE
Problem: Nutrition Deficit:  Goal: Optimize nutritional status  5/8/2025 0954 by Kathya Rice, MS, RD, LD  Outcome: Not Progressing  Flowsheets (Taken 5/8/2025 0946)  Nutrient intake appropriate for improving, restoring, or maintaining nutritional needs: (comfort measures) Assess nutritional status and recommend course of action  5/8/2025 7498 by Celestina Cabezas, RN  Outcome: Progressing

## 2025-05-08 NOTE — PROGRESS NOTES
Nephrology ([facility]) Progress Note    Patient:  Casimiro Vinson  YOB: 1965  Date of Service: 5/8/2025  MRN: [id number]   Acct: [id number]   Primary Care Physician: Abdi Lozano DO  Advance Directive: DNR  Admit Date: 5/3/2025       Hospital Day: 5  Referring Provider: Ryan Garcia DO    Patient independently seen and examined, Chart, Consults, Notes, Operative notes, Labs, Cardiology, and Radiology studies reviewed as available.    Subjective:  Casimiro Vinson is a 60 y.o. male for whom we were consulted for evaluation and treatment of end-stage renal disease.  He is a Monday Wednesday Friday hemodialysis patient.  He developed worsening dyspnea and so presented to the emergency room for evaluation was diagnosed with pulmonary edema and chest pain.  He had recently been admitted to [facility] for cardiac stenting and single-vessel CABG.  He subsequently underwent CODE BLUE with subsequent ROSC.  He was diagnosed with pericardial tamponade and taken for an emergent pericardial window.    Today, patient remains unable to participate in the history and physical examination.  Requiring Jarett-Synephrine, vasopressin for blood pressure support and Levophed has been added.   Family present at the bedside and reviewed with nursing.  CRRT running without issue with no ultrafiltration.    Dialysis   Pt was seen on renal replacement therapy and I have personally seen and evaluated the patient and directed the therapy  Modality: CRRT  Access: Catheter  Location: right IJ  QB: 250  QD: 3L/h  UF: 0        Allergies:  Levaquin [levofloxacin]    Medicines:  Current Facility-Administered Medications   Medication Dose Route Frequency Provider Last Rate Last Admin    0.9 % sodium chloride infusion   IntraVENous PRN Crow Soriano MD        norepinephrine (LEVOPHED) 16 mg in sodium chloride 0.9 % 250 mL infusion (premix)  1-100 mcg/min IntraVENous Continuous Arnold,

## 2025-05-08 NOTE — PROGRESS NOTES
Family updated on patients condition, at this time, spouse Angela and son Carlos, would like to make patient a DNR.     Electronically signed by Celestina Cabezas RN on 5/8/2025 at 8:17 AM

## 2025-05-08 NOTE — PROGRESS NOTES
Progress Note    2025 6:09 AM          POD#   4    Subjective:  Mr. Vinson remains intubated and nonresponsive  PULSE OXIMETRY RANGE: SpO2  Av.6 %  Min: 77 %  Max: 96 %  SUPPLEMENTAL O2:     Vital Signs: BP (!) 140/52   Pulse 78   Temp 99.8 °F (37.7 °C)   Resp 22   Ht 1.778 m (5' 10\")   Wt 121.7 kg (268 lb 3.2 oz)   SpO2 92%   BMI 38.48 kg/m²    Temperature Range:   Temp: 99.8 °F (37.7 °C)   Temp  Av.9 °F (36.6 °C)  Min: 95.5 °F (35.3 °C)  Max: 99.8 °F (37.7 °C)                   Rhythm: normal sinus rhythm    Labs:   ABG:    Lab Results   Component Value Date/Time    PHART 7.250 2025 04:02 AM    PO2ART 99.0 2025 04:02 AM    MZR0VAL 51.0 2025 04:02 AM    B9VOHDHO 96.7 2025 04:02 AM    LQZ8GKI 18 2025 09:29 AM    XVS1BUR 22.4 2025 04:02 AM    BEART -5.0 2025 04:02 AM     CBC:   Recent Labs     25  1200 25  0403 25  1700 25  0130   WBC 5.1 6.9  --  10.1   HGB 8.7* 8.6* 9.9* 10.3*   HCT 26.1* 26.1* 30.5* 32.7*   MCV 91.3 93.2  --  94.5*   PLT 35* 36*  --  37*     BMP:   Recent Labs     25  0428 25  0838 25  0403 25  0447 25  2240 25  0130 25  0402     --  139  --   --  141  --    K 4.4   < > 4.8   < > 4.1 4.6 4.6     --  104  --   --  106  --    CO2 21*  --  20*  --   --  20*  --    PHOS  --   --   --   --   --  3.3  --    BUN 24*  --  27*  --   --  20  --    CREATININE 4.5*  --  4.9*  --   --  3.7*  --     < > = values in this interval not displayed.     PT/INR:   Recent Labs     25  0428 25  0403   PROTIME 41.6* 29.6*   INR 4.50* 2.88*     APTT: No results for input(s): \"APTT\" in the last 72 hours.  Chest X-Ray:  patchy bilateral infiltrates c/w ARDS, no effusions; no ptx after chest tube removal   CT:  I/O last 3 completed shifts:  In: 3349.4 [I.V.:2342.1; Blood:518.8; IV Piggyback:488.6]  Out: 782 [Urine:53; Chest Tube:382]      I/O last 3 completed

## 2025-05-08 NOTE — ACP (ADVANCE CARE PLANNING)
Advance Care Planning      Palliative Medicine Provider   Advance Care Planning (ACP) Conversation      Date of Conversation: 05/08/25  The patient and/or authorized decision maker consented to a voluntary Advance Care Planning conversation.   Individuals present for the conversation:   Legal healthcare agent named below    Legal Healthcare Agent(s):    Primary Decision Maker: kermit vinson - Spouse - [phone]    ACP documents available in EMR prior to discussion:  None    Primary Palliative Diagnosis(es):  S/p cardiac arrest  Anoxic brain injury    Conversation Summary:  Mr. Vinson is a 59 yo male, non-responsive dependent on the ventilator and multiple pressors for support. He is unable to participate in conversation at this time. I met w/ multiple members of his family to include his spouse, son, sister, daughter-in-law, mother, grandson. They are tearful but voice they have discussed the patients wishes at this time and confirm he should be DNR at this point. They are considering withdrawal of care and move to the hospice care center for palliative extubation.     Resuscitation Status:    Code Status: DNR    I spent 40 minutes providing ACP services during consultation with the patient and/or surrogate decision maker in a voluntary, in-person conversation discussing the patient's wishes and goals as detailed in the above note.       Guillermina Dejesus PA-C

## 2025-05-09 PROBLEM — I46.9 CARDIAC ARREST (HCC): Status: ACTIVE | Noted: 2025-05-09

## 2025-05-09 PROBLEM — R57.0 CARDIOGENIC SHOCK (HCC): Status: ACTIVE | Noted: 2025-05-09

## 2025-05-10 LAB
BACTERIA BLD CULT ORG #2: NORMAL
BACTERIA BLD CULT: NORMAL

## (undated) DEVICE — SEAL

## (undated) DEVICE — Device

## (undated) DEVICE — ULTRACLEAN ACCESSORY ELECTRODE 4" (10.16 CM) COATED BLADE WITH EXTENDED INSULATION: Brand: ULTRACLEAN

## (undated) DEVICE — SUTURE VICRYL + SZ 2-0 L36IN ABSRB UD L36MM CT-1 1/2 CIR VCP945H

## (undated) DEVICE — 3M™ IOBAN™ 2 ANTIMICROBIAL INCISE DRAPE 6651EZ: Brand: IOBAN™ 2

## (undated) DEVICE — MINOR CDS: Brand: MEDLINE INDUSTRIES, INC.

## (undated) DEVICE — SUTURE MONOCRYL SZ 4-0 L18IN ABSRB UD L19MM PS-2 3/8 CIR PRIM Y496G

## (undated) DEVICE — BLANKET WRM W40.2XL55.9IN IORT LO BODY + MISTRAL AIR

## (undated) DEVICE — DRAIN SURG SGL COLL PT TB FOR ATS BG OASIS

## (undated) DEVICE — OPTIFOAM GENTLE SA, POSTOP, 4X12: Brand: MEDLINE

## (undated) DEVICE — SUTURE SZ 7 L18IN NONABSORBABLE SIL CCS L48MM 1/2 CIR STRNM M655G

## (undated) DEVICE — TOWEL,OR,DSP,ST,BLUE,STD,4/PK,20PK/CS: Brand: MEDLINE

## (undated) DEVICE — 6 FOOT DISPOSABLE EXTENSION CABLE WITH SAFE CONNECT / SCREW-DOWN

## (undated) DEVICE — PUMP SUC IRR TBNG L10FT W/ HNDPC ASSEMB STRYKEFLOW 2

## (undated) DEVICE — ROTATING SURGICAL PUNCHES, 1 PER POUCH: Brand: A&E MEDICAL / ROTATING SURGICAL PUNCHES

## (undated) DEVICE — SUTURE PERMAHAND SZ 2-0 L30IN NONABSORBABLE BLK SILK W/O A305H

## (undated) DEVICE — AGENT HEMOSTATIC SURGIFLOW MATRIX KIT W/THROMBIN

## (undated) DEVICE — DRAIN,WOUND,ROUND,24FR,5/16",FULL-FLUTED: Brand: MEDLINE

## (undated) DEVICE — ARM DRAPE

## (undated) DEVICE — SYSTEM SKIN CLSR 22CM DERMBND PRINEO

## (undated) DEVICE — GLOVE SURG SZ 7.5 L11.2IN THK9.8MIL STRW LTX POLYMER BEAD

## (undated) DEVICE — 3M™ IOBAN™ 2 ANTIMICROBIAL INCISE DRAPE 6650EZ: Brand: IOBAN™ 2

## (undated) DEVICE — SUTURE PERMAHAND SZ 2-0 L30IN NONABSORBABLE BLK SH L26MM C016D

## (undated) DEVICE — SUTURE VICRYL SZ 0 L27IN ABSRB VLT L48MM CTX 1/2 CIR TAPR PNT J364H

## (undated) DEVICE — GUIDEWIRE VASC L260CM DIA0038IN TIP L3MM PTFE J TIP FIX COR

## (undated) DEVICE — PACK,UNIVERSAL,NO GOWNS: Brand: MEDLINE

## (undated) DEVICE — ACCESSORY TOWEL PACK: Brand: MEDLINE INDUSTRIES, INC.

## (undated) DEVICE — SUTURE PERMAHAND SZ 2-0 L18IN NONABSORBABLE BLK L26MM FS 685G

## (undated) DEVICE — PUMP HEART IMPELLA 5.5 W/SMART ASSIST S2

## (undated) DEVICE — TIP COVER ACCESSORY

## (undated) DEVICE — DRAIN SURG L3/8-1/2IN DIA3/16IN SIL CARD CONN 1:1 BLAK

## (undated) DEVICE — SUTURE PERMAHAND SZ 2-0 L17X18IN NONABSORBABLE BLK SILK SA65H

## (undated) DEVICE — STERNUM BLADE, OFFSET (32.0 X 0.8 X 6.3MM)

## (undated) DEVICE — COVER TRANSDUCER TELESCOPICALLY FOLDED 3.5X36 IN CIV-FLEX

## (undated) DEVICE — DRAPE,UTILITY,XL,4/PK,STERILE: Brand: MEDLINE

## (undated) DEVICE — CATHETER DIAG 6FR L100CM LUMN ID0.056IN JR4 CRV 0 SIDE H

## (undated) DEVICE — SUTURE ABSORBABLE MONOFILAMENT 0 CTX 36 IN VIO PDS + PDP370T

## (undated) DEVICE — GLIDESCOPE BFLEX 3.8 BRONCHOSCOPE

## (undated) DEVICE — CATHETER ART L16CM DIA18GA 0.025IN FEM SFTY SET W/ SHRP

## (undated) DEVICE — AGENT HEMSTAT W4XL4IN OXIDIZED REGENERATED CELOS STRUCTURED

## (undated) DEVICE — TBG INSUFFLATION W/PUSH ON CON: Brand: MEDLINE INDUSTRIES, INC.

## (undated) DEVICE — AGENT HEMSTAT W2XL4IN FIBRIN SEAL PTCH DSG EVARREST

## (undated) DEVICE — MONOPOLAR CAUTERY CORD

## (undated) DEVICE — DRAPE C ARM W42XL120IN W POLY STRP W OUT LENS

## (undated) DEVICE — BIPOLAR CAUTERY CORD

## (undated) DEVICE — SUTURE MONOCRYL + SZ 4-0 L18IN ABSRB UD L19MM PS-2 3/8 CIR MCP496G